# Patient Record
Sex: MALE | Race: BLACK OR AFRICAN AMERICAN | NOT HISPANIC OR LATINO | Employment: PART TIME | URBAN - METROPOLITAN AREA
[De-identification: names, ages, dates, MRNs, and addresses within clinical notes are randomized per-mention and may not be internally consistent; named-entity substitution may affect disease eponyms.]

---

## 2017-07-29 ENCOUNTER — HOSPITAL ENCOUNTER (EMERGENCY)
Facility: CLINIC | Age: 22
Discharge: HOME | End: 2017-07-29
Attending: PHYSICIAN ASSISTANT | Admitting: PHYSICIAN ASSISTANT
Payer: COMMERCIAL

## 2017-07-29 VITALS
DIASTOLIC BLOOD PRESSURE: 79 MMHG | RESPIRATION RATE: 18 BRPM | BODY MASS INDEX: 25.83 KG/M2 | TEMPERATURE: 96.4 F | SYSTOLIC BLOOD PRESSURE: 129 MMHG | OXYGEN SATURATION: 99 % | WEIGHT: 180 LBS

## 2017-07-29 DIAGNOSIS — H10.33 ACUTE CONJUNCTIVITIS OF BOTH EYES, UNSPECIFIED ACUTE CONJUNCTIVITIS TYPE: ICD-10-CM

## 2017-07-29 PROCEDURE — 99283 EMERGENCY DEPT VISIT LOW MDM: CPT

## 2017-07-29 RX ORDER — POLYMYXIN B SULFATE AND TRIMETHOPRIM 1; 10000 MG/ML; [USP'U]/ML
1 SOLUTION OPHTHALMIC
Qty: 1 BOTTLE | Refills: 0 | Status: SHIPPED | OUTPATIENT
Start: 2017-07-29 | End: 2017-08-05

## 2017-07-29 ASSESSMENT — ENCOUNTER SYMPTOMS
COUGH: 0
EYE ITCHING: 0
FEVER: 0
EYE PAIN: 1
FACIAL SWELLING: 0
SORE THROAT: 0
PHOTOPHOBIA: 1
EYE DISCHARGE: 1
EYE REDNESS: 1
RHINORRHEA: 0

## 2017-07-29 NOTE — DISCHARGE INSTRUCTIONS
"Discharge Instructions  Conjunctivitis  Conjunctivitis, or \"pinkeye\", is inflammation of the conjunctiva which is the thin membrane that lines the inner surface of the eyelids and the whites of the eyes.   There are four main types of conjunctivitis: viral, bacterial, allergic, and non-specific. Both bacterial and viral conjunctivitis spread easily from one person to another by contact with the eye or another person s hands, by an object the infected person has touched, such as a door handle, or by sharing an object that has touched their eye such as a towel or pillow case. Because of this, children with bacterial conjunctivitis can t go back to school or  until they have been on antibiotic drops for 24 hours.  VIRAL CONJUNCTIVITIS:  This is typically caused by the virus that also causes the common cold and is often seen as part of a general cold.  This type of conjunctivitis is not treated with antibiotics, and usually lasts 3 - 5 days.  An over the counter antihistamine/decongestant eye drop may help to relieve the itching and irritation of viral conjunctivitis.  BACTERIAL CONJUNCTIVITIS:  This is treated with an antibiotic ointment or eye drop.  In both bacterial and viral conjunctivitis, do not wear contact lenses until your eye is no longer red.   Your contact case should be thrown away and the contacts disinfected overnight, or replaced if disposable.  NON SPECIFIC CONJUNCTIVITIS: Sometimes a red eye is caused by other things such as dry eye, chemical exposure, or foreign body in the eye such as dust or eyelash.   All of these problems generally improve on their own within 24 hours.  ALLERGIC CONJUNCTIVITIS: These are eye symptoms caused by allergies. This type of conjunctivitis will be treated with allergy medications.    Return to the Emergency Department if:    If you have blurry vision.    If you have increasing eye pain or drainage.    If you have redness or swelling in the skin around the " eye.    If you have a fever.    Follow-up with your doctor:      Your eye should improve within 2 days, if it does not, return to the Emergency Department or see your regular doctor for a second eye exam.  If you were given a prescription for medicine here today, be sure to read all of the information (including the package insert) that comes with your prescription.  This will include important information about the medicine, its side effects, and any warnings that you need to know about.  The pharmacist who fills the prescription can provide more information and answer questions you may have about the medicine.  If you have questions or concerns that the pharmacist cannot address, please call or return to the Emergency Department.           Remember that you can always come back to the Emergency Department if you are not able to see your regular doctor in the amount of time listed above, if you get any new symptoms, or if there is anything that worries you.

## 2017-07-29 NOTE — ED PROVIDER NOTES
"  History     Chief Complaint:  Eye Pain     HPI   Jakcie Medeiros is a 22 year old male who presents to the emergency department today for evaluation of eye pain. The patient reports a week ago he developed left eye pain that has spread to the right eye with associated photophobia, redness, and crusting in the eye when he wakes up. He does have some blurriness from the drainage, but this clears without any other vision changes.  He started working night shift and \"can't stand\" looking at the light or screen to do homework. He took an off brand tylenol and \"allergy medicine\"  Without relief. Due to persistent pain, he presents to the ED for further evalution. Upon presentation, he states he had a similar episode a year ago in which he went to an optical area at [facility] and diagnosed with conjunctivitis and started on drops. The left eye is worse than the right. He denies known chemical exposure or getting anything in the eye. Of note, he wears glasses, but not contacts. He denies any fever, facial pain or swelling, cough or cold symptoms, or any other symptoms or concerns.     Allergies:  Dust mites     Medications:    The patient is currently on no regular medications.    Past Medical History:    Anemia  Vitamin D deficiency     Past Surgical History:    History reviewed. No pertinent surgical history.    Family History:    History reviewed. No pertinent family history.     Social History:  The patient was alone.  Smoking Status: Never  Smokeless Tobacco: Never  Alcohol Use: No  Marital Status:  Single      Review of Systems   Constitutional: Negative for fever.   HENT: Negative for congestion, ear pain, facial swelling, rhinorrhea and sore throat.    Eyes: Positive for photophobia, pain, discharge, redness and visual disturbance. Negative for itching.   Respiratory: Negative for cough.    Skin: Negative for rash.   All other systems reviewed and are negative.    Physical Exam   First Vitals:  BP: 129/79  Heart " Rate: 90  Temp: 96.4  F (35.8  C)  Resp: 16  Weight: 81.6 kg (180 lb)  SpO2: 99 %      Physical Exam  General: Resting comfortably on a chair  Head:  The scalp, head and face appear normal. No evidence of trauma.   Eyes:  Pupils are equal, round and reactive to light and accomodation. EOM intact.     No foreign body.     Left conjunctival injection with faint injection on the right. Small amount of thick yellow drainage to the corner of the left eye.     No orbital swelling, redness or tenderness.   Resp:  Non-labored breathing. No tachypnea.    MS:  Normal muscular tone.   Neuro:  Awake and alert.    Skin:  No abrasions or lacerations, rash or ecchymosis.   Psych: Normal affect. Appropriate interactions.       Emergency Department Course     Emergency Department Course:  Nursing notes and vitals reviewed.  1620: I performed an exam of the patient as documented above.   I discussed the treatment plan with the patient. They expressed understanding of this plan and consented to discharge. They will be discharged home with instructions for care and follow up. In addition, the patient will return to the emergency department if their symptoms persist, worsen, if new symptoms arise or if there is any concern.  All questions were answered.    Impression & Plan      Medical Decision Making:  Jackie Medeiros is a 22 year old male who presents for evaluation of a red eye.  A broad differential diagnosis was considered including bacterial conjunctivitis, viral conjunctivitis, foreign body, corneal abrasion, chemical vs allergic conjunctivitis, corneal ulcer, HSV, herpes zoster opthalmicus, endopthalmitis, orbital cellulitis, etc.  Signs and symptoms consistent with a conjunctivitis, likely bacterial. Will start antibiotics and have close follow-up of eye physician.  No red flag symptoms to suggest any of the above worrisome etiologies.  I discussed the results, plan and any additional questions with the patient. He  verbalized understanding and agreement with the plan.      Diagnosis:    ICD-10-CM    1. Acute conjunctivitis of both eyes, unspecified acute conjunctivitis type H10.33      Disposition:  discharged to home with the below prescription     Discharge Medications:  New Prescriptions    TRIMETHOPRIM-POLYMYXIN B (POLYTRIM) OPHTHALMIC SOLUTION    Place 1 drop into both eyes every 3 hours for 7 days     Scribe Disclosure:  IZelda, am serving as a scribe at 4:12 PM on 7/29/2017 to document services personally performed by Flavia Hutchinson PA-C based on my observations and the provider's statements to me.     7/29/2017    EMERGENCY DEPARTMENT       Flavia Hutchinson PA-C  07/29/17 0224

## 2017-07-29 NOTE — LETTER
EMERGENCY DEPARTMENT  [address] [facility] [address]  [phone]    2017    Jackie Medeiros  [address]   [address]  [place] [address]  [phone] (home) none (work)    : 1995      To Whom it may concern:    Jackie Medeiros was seen in our Emergency Department today, 2017.  He may not return to work until 24 hours on antibiotics, which should be  at 5 pm he can return.     Sincerely,        Flavia Hutchinson

## 2017-07-29 NOTE — ED AVS SNAPSHOT
Emergency Department    [address] [facility] [address]    Phone:  [phone]    Fax:  [phone]                                       Jackie Medeiros   MRN: [id number]    Department:   Emergency Department   Date of Visit:  7/29/2017           After Visit Summary Signature Page     I have received my discharge instructions, and my questions have been answered. I have discussed any challenges I see with this plan with the nurse or doctor.    ..........................................................................................................................................  Patient/Patient Representative Signature      ..........................................................................................................................................  Patient Representative Print Name and Relationship to Patient    ..................................................               ................................................  Date                                            Time    ..........................................................................................................................................  Reviewed by Signature/Title    ...................................................              ..............................................  Date                                                            Time

## 2017-07-29 NOTE — ED AVS SNAPSHOT
"  Emergency Department    [address] [facility] [address]    Phone:  [phone]    Fax:  [phone]                                       Jackie Medeiros   MRN: [id number]    Department:   Emergency Department   Date of Visit:  7/29/2017           Patient Information     Date Of Birth          1995        Your diagnoses for this visit were:     Acute conjunctivitis of both eyes, unspecified acute conjunctivitis type        You were seen by Flavia Hutchinson PA-C.      Follow-up Information     Please follow up.    Why:  your opthalmologist in 2-3 days for recheck        Follow up with  Emergency Department.    Specialty:  EMERGENCY MEDICINE    Why:  If symptoms worsen    Contact information:    [address] [facility] [id number] [phone]        Discharge Instructions       Discharge Instructions  Conjunctivitis  Conjunctivitis, or \"pinkeye\", is inflammation of the conjunctiva which is the thin membrane that lines the inner surface of the eyelids and the whites of the eyes.   There are four main types of conjunctivitis: viral, bacterial, allergic, and non-specific. Both bacterial and viral conjunctivitis spread easily from one person to another by contact with the eye or another person s hands, by an object the infected person has touched, such as a door handle, or by sharing an object that has touched their eye such as a towel or pillow case. Because of this, children with bacterial conjunctivitis can t go back to school or  until they have been on antibiotic drops for 24 hours.  VIRAL CONJUNCTIVITIS:  This is typically caused by the virus that also causes the common cold and is often seen as part of a general cold.  This type of conjunctivitis is not treated with antibiotics, and usually lasts 3 - 5 days.  An over the counter antihistamine/decongestant eye drop may help to relieve the itching and irritation of viral conjunctivitis.  BACTERIAL " CONJUNCTIVITIS:  This is treated with an antibiotic ointment or eye drop.  In both bacterial and viral conjunctivitis, do not wear contact lenses until your eye is no longer red.   Your contact case should be thrown away and the contacts disinfected overnight, or replaced if disposable.  NON SPECIFIC CONJUNCTIVITIS: Sometimes a red eye is caused by other things such as dry eye, chemical exposure, or foreign body in the eye such as dust or eyelash.   All of these problems generally improve on their own within 24 hours.  ALLERGIC CONJUNCTIVITIS: These are eye symptoms caused by allergies. This type of conjunctivitis will be treated with allergy medications.    Return to the Emergency Department if:    If you have blurry vision.    If you have increasing eye pain or drainage.    If you have redness or swelling in the skin around the eye.    If you have a fever.    Follow-up with your doctor:      Your eye should improve within 2 days, if it does not, return to the Emergency Department or see your regular doctor for a second eye exam.  If you were given a prescription for medicine here today, be sure to read all of the information (including the package insert) that comes with your prescription.  This will include important information about the medicine, its side effects, and any warnings that you need to know about.  The pharmacist who fills the prescription can provide more information and answer questions you may have about the medicine.  If you have questions or concerns that the pharmacist cannot address, please call or return to the Emergency Department.           Remember that you can always come back to the Emergency Department if you are not able to see your regular doctor in the amount of time listed above, if you get any new symptoms, or if there is anything that worries you.          24 Hour Appointment Hotline       To make an appointment at any [facility], call [phone] ([phone]). If you  don't have a family doctor or clinic, we will help you find one. [facility] clinics are conveniently located to serve the needs of you and your family.             Review of your medicines      START taking        Dose / Directions Last dose taken    trimethoprim-polymyxin b ophthalmic solution   Commonly known as:  POLYTRIM   Dose:  1 drop   Quantity:  1 Bottle        Place 1 drop into both eyes every 3 hours for 7 days   Refills:  0                Prescriptions were sent or printed at these locations (1 Prescription)                   Other Prescriptions                Printed at Department/Unit printer (1 of 1)         trimethoprim-polymyxin b (POLYTRIM) ophthalmic solution                Orders Needing Specimen Collection     None      Pending Results     No orders found from 7/27/2017 to 7/30/2017.            Pending Culture Results     No orders found from 7/27/2017 to 7/30/2017.            Pending Results Instructions     If you had any lab results that were not finalized at the time of your Discharge, you can call the ED Lab Result RN at [phone]. You will be contacted by this team for any positive Lab results or changes in treatment. The nurses are available 7 days a week from 10A to 6:30P.  You can leave a message 24 hours per day and they will return your call.        Test Results From Your Hospital Stay               Clinical Quality Measure: Blood Pressure Screening     Your blood pressure was checked while you were in the emergency department today. The last reading we obtained was  BP: 129/79 . Please read the guidelines below about what these numbers mean and what you should do about them.  If your systolic blood pressure (the top number) is less than 120 and your diastolic blood pressure (the bottom number) is less than 80, then your blood pressure is normal. There is nothing more that you need to do about it.  If your systolic blood pressure (the top number) is 120-139 or your diastolic blood  pressure (the bottom number) is 80-89, your blood pressure may be higher than it should be. You should have your blood pressure rechecked within a year by a primary care provider.  If your systolic blood pressure (the top number) is 140 or greater or your diastolic blood pressure (the bottom number) is 90 or greater, you may have high blood pressure. High blood pressure is treatable, but if left untreated over time it can put you at risk for heart attack, stroke, or kidney failure. You should have your blood pressure rechecked by a primary care provider within the next 4 weeks.  If your provider in the emergency department today gave you specific instructions to follow-up with your doctor or provider even sooner than that, you should follow that instruction and not wait for up to 4 weeks for your follow-up visit.        Thank you for choosing [facility]       Thank you for choosing [facility] for your care. Our goal is always to provide you with excellent care. Hearing back from our patients is one way we can continue to improve our services. Please take a few minutes to complete the written survey that you may receive in the mail after you visit with us. Thank you!        [employer] Information     [employer] gives you secure access to your electronic health record. If you see a primary care provider, you can also send messages to your care team and make appointments. If you have questions, please call your primary care clinic.  If you do not have a primary care provider, please call [phone] and they will assist you.        Care EveryWhere ID     This is your Care EveryWhere ID. This could be used by other organizations to access your [facility] medical records  [id number]        Equal Access to Services     JONY FRANKEL : Hadii sita wallaceo Sonick, waaxda lubaldevadaha, qaybta kaalenrique vasquez . So [facility] [phone].    ATENCIÓN: Si kristen espkoby, madisyn a jacob disposición  servicios gratuitos de asistencia lingüística. Asher huff [phone].    We comply with applicable federal civil rights laws and [place] laws. We do not discriminate on the basis of race, color, national origin, age, disability sex, sexual orientation or gender identity.            After Visit Summary       This is your record. Keep this with you and show to your community pharmacist(s) and doctor(s) at your next visit.

## 2017-09-22 ENCOUNTER — OFFICE VISIT (OUTPATIENT)
Dept: URGENT CARE | Facility: URGENT CARE | Age: 22
End: 2017-09-22
Payer: COMMERCIAL

## 2017-09-22 VITALS
SYSTOLIC BLOOD PRESSURE: 142 MMHG | TEMPERATURE: 99.4 F | WEIGHT: 194.9 LBS | DIASTOLIC BLOOD PRESSURE: 78 MMHG | OXYGEN SATURATION: 96 % | HEART RATE: 89 BPM | BODY MASS INDEX: 27.97 KG/M2

## 2017-09-22 DIAGNOSIS — T78.40XA ALLERGIC REACTION, INITIAL ENCOUNTER: Primary | ICD-10-CM

## 2017-09-22 DIAGNOSIS — L50.9 HIVES: ICD-10-CM

## 2017-09-22 PROCEDURE — 99214 OFFICE O/P EST MOD 30 MIN: CPT | Mod: 25 | Performed by: FAMILY MEDICINE

## 2017-09-22 PROCEDURE — 96372 THER/PROPH/DIAG INJ SC/IM: CPT | Performed by: FAMILY MEDICINE

## 2017-09-22 RX ORDER — MINOCYCLINE HYDROCHLORIDE 100 MG/1
CAPSULE ORAL
Refills: 0 | COMMUNITY
Start: 2017-09-13 | End: 2017-09-25

## 2017-09-22 RX ORDER — CETIRIZINE HYDROCHLORIDE 10 MG/1
10 TABLET ORAL EVERY EVENING
Qty: 1 TABLET | Refills: 0
Start: 2017-09-22 | End: 2017-09-23

## 2017-09-22 RX ORDER — METHYLPREDNISOLONE SODIUM SUCCINATE 125 MG/2ML
125 INJECTION, POWDER, LYOPHILIZED, FOR SOLUTION INTRAMUSCULAR; INTRAVENOUS ONCE
Qty: 2 ML | Refills: 0 | OUTPATIENT
Start: 2017-09-22 | End: 2017-09-22

## 2017-09-22 NOTE — MR AVS SNAPSHOT
After Visit Summary   9/22/2017    Jackie Medeiros    MRN: [id number]           Patient Information     Date Of Birth          1995        Visit Information        Provider Department      9/22/2017 12:10 PM Freddy Kent DO [facility]        Today's Diagnoses     Hives    -  1       Follow-ups after your visit        Your next 10 appointments already scheduled     Sep 22, 2017  1:20 PM CDT   Office Visit with Yves Moscoso MD   [facility] ([facility]    [address] [id number] [phone]           Bring a current list of meds and any records pertaining to this visit. For Physicals, please bring immunization records and any forms needing to be filled out. Please arrive 10 minutes early to complete paperwork.              Who to contact     If you have questions or need follow up information about today's clinic visit or your schedule please contact [facility] directly at [phone].  Normal or non-critical lab and imaging results will be communicated to you by MyChart, letter or phone within 4 business days after the clinic has received the results. If you do not hear from us within 7 days, please contact the clinic through [employer] or phone. If you have a critical or abnormal lab result, we will notify you by phone as soon as possible.  Submit refill requests through [facility] or call your pharmacy and they will forward the refill request to us. Please allow 3 business days for your refill to be completed.          Additional Information About Your Visit        MyChart Information     [employer] gives you secure access to your electronic health record. If you see a primary care provider, you can also send messages to your care team and make appointments. If you have questions, please call your primary care clinic.  If you do not have a primary care  provider, please call [phone] and they will assist you.        Care EveryWhere ID     This is your Care EveryWhere ID. This could be used by other organizations to access your [facility] medical records  [id number]        Your Vitals Were     Pulse Temperature Pulse Oximetry BMI (Body Mass Index)          89 99.4  F (37.4  C) (Oral) 96% 27.97 kg/m2         Blood Pressure from Last 3 Encounters:   09/22/17 142/78   07/29/17 129/79   06/08/16 100/80    Weight from Last 3 Encounters:   09/22/17 194 lb 14.4 oz (88.4 kg)   07/29/17 180 lb (81.6 kg)   06/08/16 175 lb (79.4 kg)              We Performed the Following     METHYLPREDNISOLONE INJ / 125MG          Today's Medication Changes          These changes are accurate as of: 9/22/17  1:04 PM.  If you have any questions, ask your nurse or doctor.               Start taking these medicines.        Dose/Directions    cetirizine 10 MG tablet   Commonly known as:  zyrTEC   Used for:  Hives   Started by:  Freddy Kent DO        Dose:  10 mg   Take 1 tablet (10 mg) by mouth every evening for 1 day   Quantity:  1 tablet   Refills:  0       methylPREDNISolone sodium succinate 125 mg/2 mL injection   Commonly known as:  solu-MEDROL   Used for:  Hives   Started by:  Freddy Kent DO        Dose:  125 mg   Inject 2 mLs (125 mg) into the muscle once for 1 dose   Quantity:  2 mL   Refills:  0            Where to get your medicines      Some of these will need a paper prescription and others can be bought over the counter.  Ask your nurse if you have questions.     You don't need a prescription for these medications     cetirizine 10 MG tablet    methylPREDNISolone sodium succinate 125 mg/2 mL injection                Primary Care Provider Office Phone # Fax #    Yves Moscoso [phone]       [address] [place] [address]        Equal Access to Services     JONY FRANKEL AH: Pooja Michel, luiza morel, jerrell irving  enrique resendizkirtidemar la'aahi ah. Dianne [facility] [phone].    ATENCIÓN: Si habla audelia, tiene a jacob disposición servicios gratuitos de asistencia lingüística. Asher al [phone].    We comply with applicable federal civil rights laws and [place] laws. We do not discriminate on the basis of race, color, national origin, age, disability sex, sexual orientation or gender identity.            Thank you!     Thank you for choosing [facility]  for your care. Our goal is always to provide you with excellent care. Hearing back from our patients is one way we can continue to improve our services. Please take a few minutes to complete the written survey that you may receive in the mail after your visit with us. Thank you!             Your Updated Medication List - Protect others around you: Learn how to safely use, store and throw away your medicines at www.disposemymeds.org.          This list is accurate as of: 9/22/17  1:04 PM.  Always use your most recent med list.                   Brand Name Dispense Instructions for use Diagnosis    cetirizine 10 MG tablet    zyrTEC    1 tablet    Take 1 tablet (10 mg) by mouth every evening for 1 day    Hives       methylPREDNISolone sodium succinate 125 mg/2 mL injection    solu-MEDROL    2 mL    Inject 2 mLs (125 mg) into the muscle once for 1 dose    Hives       minocycline 100 MG capsule    MINOCIN/DYNACIN     TAKE 1 BY MOUTH TWICE DAILY FOR ACNE

## 2017-09-22 NOTE — NURSING NOTE
"Chief Complaint   Patient presents with     Urgent Care     pt states itchy, whole body swollen, tightness 2x days        Initial /78  Pulse 89  Temp 99.4  F (37.4  C) (Oral)  Wt 194 lb 14.4 oz (88.4 kg)  SpO2 96%  BMI 27.97 kg/m2 Estimated body mass index is 27.97 kg/(m^2) as calculated from the following:    Height as of 6/8/16: 5' 10\" (1.778 m).    Weight as of this encounter: 194 lb 14.4 oz (88.4 kg).  Medication Reconciliation: complete      "

## 2017-09-22 NOTE — PROGRESS NOTES
SUBJECTIVE:Jackie Medeiros is a 22 year old male who presents to the clinic today for a rash.  Onset of rash was 1 day(s) ago.   Rash is still present.   Location of the rash: generalized.  Associated symptoms include: itching.    Symptoms are moderate and rash seems to be worsening.  Therapies tried to improve the rash: none.  Previous history of a similar rash? No  Recent exposure history: minocycle for acne started 1 week ago    Past Medical History:   Diagnosis Date     Abnormal laboratory test 1/1/2012     Allergic state      Anemia 8/6/2013     Environmental allergies 1/6/2013     Vitamin D deficiency 1/24/14    vitamin D level 23     Allergies   Allergen Reactions     Dust Mites      Minocycline Hives     Social History   Substance Use Topics     Smoking status: Never Smoker     Smokeless tobacco: Never Used     Alcohol use No       ROS:CONSTITUTIONAL:NEGATIVE for fever, chills, change in weight  ENT/MOUTH: NEGATIVE for ear, mouth and throat problems  RESP:NEGATIVE for significant cough or SOB    EXAM: VITALS: /78  Pulse 89  Temp 99.4  F (37.4  C) (Oral)  Wt 194 lb 14.4 oz (88.4 kg)  SpO2 96%  BMI 27.97 kg/m2  General:healthy,alert,no distress    Location: generalized     Distribution: diffuse/patchy     Lesion grouping: bilareerat       Lesion type: macular and wheals     Color: skin color with no other findingsPERTINENT EXAM: GENERAL APPEARANCE: healthy, alert and no distress  EYES: EOMI,  PERRL, conjunctiva clear  NECK: supple, non-tender to palpation, no adenopathy noted  RESP: lungs clear to auscultation - no rales, rhonchi or wheezes  CV: regular rates and rhythm, normal S1 S2, no murmur noted      ICD-10-CM    1. Hives L50.9 cetirizine (ZYRTEC) 10 MG tablet     methylPREDNISolone sodium succinate (SOLU-MEDROL) 125 mg/2 mL injection     METHYLPREDNISOLONE INJ / 125MG     Dc minocycline and cont zyrtec  Follow-up with primary clinic if not improving

## 2017-09-25 ENCOUNTER — OFFICE VISIT (OUTPATIENT)
Dept: INTERNAL MEDICINE | Facility: CLINIC | Age: 22
End: 2017-09-25
Payer: COMMERCIAL

## 2017-09-25 VITALS
SYSTOLIC BLOOD PRESSURE: 114 MMHG | DIASTOLIC BLOOD PRESSURE: 82 MMHG | OXYGEN SATURATION: 97 % | BODY MASS INDEX: 27.96 KG/M2 | TEMPERATURE: 98.9 F | WEIGHT: 195.3 LBS | HEART RATE: 75 BPM | HEIGHT: 70 IN

## 2017-09-25 DIAGNOSIS — L50.9 HIVES: Primary | ICD-10-CM

## 2017-09-25 PROCEDURE — 99213 OFFICE O/P EST LOW 20 MIN: CPT | Performed by: INTERNAL MEDICINE

## 2017-09-25 RX ORDER — CETIRIZINE HYDROCHLORIDE 10 MG/1
10 TABLET ORAL DAILY
COMMUNITY
End: 2020-09-17

## 2017-09-25 NOTE — MR AVS SNAPSHOT
"              After Visit Summary   9/25/2017    Jackie Medeiros    MRN: [id number]           Patient Information     Date Of Birth          1995        Visit Information        Provider Department      9/25/2017 1:40 PM Anastacio Frost MD [facility]        Today's Diagnoses     Hives    -  1       Follow-ups after your visit        Who to contact     If you have questions or need follow up information about today's clinic visit or your schedule please contact [facility] directly at [phone].  Normal or non-critical lab and imaging results will be communicated to you by [employer], letter or phone within 4 business days after the clinic has received the results. If you do not hear from us within 7 days, please contact the clinic through [employer] or phone. If you have a critical or abnormal lab result, we will notify you by phone as soon as possible.  Submit refill requests through [employer] or call your pharmacy and they will forward the refill request to us. Please allow 3 business days for your refill to be completed.          Additional Information About Your Visit        MyChart Information     [employer] gives you secure access to your electronic health record. If you see a primary care provider, you can also send messages to your care team and make appointments. If you have questions, please call your primary care clinic.  If you do not have a primary care provider, please call [phone] and they will assist you.        Care EveryWhere ID     This is your Care EveryWhere ID. This could be used by other organizations to access your [facility] medical records  [id number]        Your Vitals Were     Pulse Temperature Height Pulse Oximetry BMI (Body Mass Index)       75 98.9  F (37.2  C) (Oral) 5' 10\" (1.778 m) 97% 28.02 kg/m2        Blood Pressure from Last 3 Encounters:   09/25/17 114/82   09/22/17 142/78   07/29/17 129/79    Weight from Last 3 " Encounters:   09/25/17 195 lb 4.8 oz (88.6 kg)   09/22/17 194 lb 14.4 oz (88.4 kg)   07/29/17 180 lb (81.6 kg)              Today, you had the following     No orders found for display         Today's Medication Changes          These changes are accurate as of: 9/25/17  3:42 PM.  If you have any questions, ask your nurse or doctor.               Stop taking these medicines if you haven't already. Please contact your care team if you have questions.     minocycline 100 MG capsule   Commonly known as:  MINOCIN/DYNACIN   Stopped by:  Anastacio Frost MD                    Primary Care Provider Office Phone # Fax #    Yves Deepak Moscoso [phone]       [address] [place] [address]        Equal Access to Services     JONY FRANKEL : Pooja ramirez Sonick, waaxda luqadaha, qaybta kaalmada adeegyasandy, enrique glover . So [facility] [phone].    ATENCIÓN: Si habla español, tiene a jacob disposición servicios gratuitos de asistencia lingüística. Llame al [phone].    We comply with applicable federal civil rights laws and [place] laws. We do not discriminate on the basis of race, color, national origin, age, disability sex, sexual orientation or gender identity.            Thank you!     Thank you for choosing [facility]  for your care. Our goal is always to provide you with excellent care. Hearing back from our patients is one way we can continue to improve our services. Please take a few minutes to complete the written survey that you may receive in the mail after your visit with us. Thank you!             Your Updated Medication List - Protect others around you: Learn how to safely use, store and throw away your medicines at www.disposemymeds.org.          This list is accurate as of: 9/25/17  3:42 PM.  Always use your most recent med list.                   Brand Name Dispense Instructions for use Diagnosis    cetirizine 10 MG  tablet    zyrTEC     Take 10 mg by mouth daily

## 2017-09-25 NOTE — NURSING NOTE
"Chief Complaint   Patient presents with     Hospital F/U       Initial /82  Pulse 75  Temp 98.9  F (37.2  C) (Oral)  Ht 5' 10\" (1.778 m)  Wt 195 lb 4.8 oz (88.6 kg)  SpO2 97%  BMI 28.02 kg/m2 Estimated body mass index is 28.02 kg/(m^2) as calculated from the following:    Height as of this encounter: 5' 10\" (1.778 m).    Weight as of this encounter: 195 lb 4.8 oz (88.6 kg).  Medication Reconciliation: complete    "

## 2017-09-25 NOTE — PROGRESS NOTES
"  SUBJECTIVE:   Jackie Medeiros is a 22 year old male who presents to clinic today for the following health issues:      ED/UC Followup:    Facility:  [facility]  Date of visit: 9-22-17  Reason for visit: Allergic reaction  Current Status: better but still can feel it     Seen for Hives- now resolvec      Problem list and histories reviewed & adjusted, as indicated.  Additional history: as documented    Labs reviewed in EPIC    Reviewed and updated as needed this visit by clinical staff  Allergies       Reviewed and updated as needed this visit by Provider         ROS:  Constitutional, HEENT, cardiovascular, pulmonary, gi and gu systems are negative, except as otherwise noted.      OBJECTIVE:                                                    /82  Pulse 75  Temp 98.9  F (37.2  C) (Oral)  Ht 5' 10\" (1.778 m)  Wt 195 lb 4.8 oz (88.6 kg)  SpO2 97%  BMI 28.02 kg/m2  Body mass index is 28.02 kg/(m^2).  GENERAL APPEARANCE: healthy, alert and no distress  SKIN: no suspicious lesions or rashes    Diagnostic test results:  none        ASSESSMENT/PLAN:                                                    1. Hives  - seems resolved. Possible allergy to minocycline given timing      Anastacio Frost MD  [facility]  "

## 2018-12-07 ENCOUNTER — OFFICE VISIT (OUTPATIENT)
Dept: INTERNAL MEDICINE | Facility: CLINIC | Age: 23
End: 2018-12-07
Payer: COMMERCIAL

## 2018-12-07 VITALS
TEMPERATURE: 98.1 F | OXYGEN SATURATION: 99 % | DIASTOLIC BLOOD PRESSURE: 74 MMHG | WEIGHT: 187.8 LBS | BODY MASS INDEX: 26.95 KG/M2 | SYSTOLIC BLOOD PRESSURE: 118 MMHG | HEART RATE: 87 BPM

## 2018-12-07 DIAGNOSIS — R53.81 MALAISE: Primary | ICD-10-CM

## 2018-12-07 DIAGNOSIS — R30.0 DYSURIA: ICD-10-CM

## 2018-12-07 LAB
ALBUMIN UR-MCNC: NEGATIVE MG/DL
APPEARANCE UR: CLEAR
BILIRUB UR QL STRIP: NEGATIVE
COLOR UR AUTO: YELLOW
GLUCOSE UR STRIP-MCNC: NEGATIVE MG/DL
HGB UR QL STRIP: NEGATIVE
KETONES UR STRIP-MCNC: NEGATIVE MG/DL
LEUKOCYTE ESTERASE UR QL STRIP: NEGATIVE
NITRATE UR QL: NEGATIVE
PH UR STRIP: 5.5 PH (ref 5–7)
RBC #/AREA URNS AUTO: NORMAL /HPF
SOURCE: NORMAL
SP GR UR STRIP: 1.02 (ref 1–1.03)
UROBILINOGEN UR STRIP-ACNC: 0.2 EU/DL (ref 0.2–1)
WBC #/AREA URNS AUTO: NORMAL /HPF

## 2018-12-07 PROCEDURE — 87491 CHLMYD TRACH DNA AMP PROBE: CPT | Performed by: PHYSICIAN ASSISTANT

## 2018-12-07 PROCEDURE — 99214 OFFICE O/P EST MOD 30 MIN: CPT | Performed by: PHYSICIAN ASSISTANT

## 2018-12-07 PROCEDURE — 81001 URINALYSIS AUTO W/SCOPE: CPT | Performed by: PHYSICIAN ASSISTANT

## 2018-12-07 PROCEDURE — 87591 N.GONORRHOEAE DNA AMP PROB: CPT | Performed by: PHYSICIAN ASSISTANT

## 2018-12-07 RX ORDER — AMOXICILLIN 500 MG/1
CAPSULE ORAL
Refills: 3 | COMMUNITY
Start: 2018-11-29 | End: 2020-02-12

## 2018-12-07 NOTE — MR AVS SNAPSHOT
After Visit Summary   12/7/2018    Jackie Medeiros    MRN: [id number]           Patient Information     Date Of Birth          1995        Visit Information        Provider Department      12/7/2018 8:40 AM Fanny Woods PA-C [facility]        Today's Diagnoses     Dysuria    -  1    Malaise           Follow-ups after your visit        Follow-up notes from your care team     Return in about 1 month (around 1/7/2019) for Physical Exam.      Who to contact     If you have questions or need follow up information about today's clinic visit or your schedule please contact [facility] directly at [phone].  Normal or non-critical lab and imaging results will be communicated to you by MyChart, letter or phone within 4 business days after the clinic has received the results. If you do not hear from us within 7 days, please contact the clinic through [employer] or phone. If you have a critical or abnormal lab result, we will notify you by phone as soon as possible.  Submit refill requests through [employer] or call your pharmacy and they will forward the refill request to us. Please allow 3 business days for your refill to be completed.          Additional Information About Your Visit        MyChart Information     [employer] gives you secure access to your electronic health record. If you see a primary care provider, you can also send messages to your care team and make appointments. If you have questions, please call your primary care clinic.  If you do not have a primary care provider, please call [phone] and they will assist you.        Care EveryWhere ID     This is your Care EveryWhere ID. This could be used by other organizations to access your [facility] medical records  [id number]        Your Vitals Were     Pulse Temperature Pulse Oximetry BMI (Body Mass Index)          87 98.1  F (36.7  C) (Oral) 99% 26.95 kg/m2         Blood Pressure  from Last 3 Encounters:   12/07/18 118/74   09/25/17 114/82   09/22/17 142/78    Weight from Last 3 Encounters:   12/07/18 187 lb 12.8 oz (85.2 kg)   09/25/17 195 lb 4.8 oz (88.6 kg)   09/22/17 194 lb 14.4 oz (88.4 kg)              We Performed the Following     CBC with platelets and differential     Chlamydia trachomatis PCR     Comprehensive metabolic panel     NEISSERIA GONORRHOEA PCR     UA with Microscopic reflex to Culture        Primary Care Provider Office Phone # Fax #    Yves Moscoso [phone]       [address] [facility] [address]        Equal Access to Services     JONY FRANKEL : Hadii sita wallaceo Sonick, waaxda adriel, qaybta kaalmada aderan, enrique mead. So [facility] [phone].    ATENCIÓN: Si habla español, tiene a jacob disposición servicios gratuitos de asistencia lingüística. Llame al [phone].    We comply with applicable federal civil rights laws and [place] laws. We do not discriminate on the basis of race, color, national origin, age, disability, sex, sexual orientation, or gender identity.            Thank you!     Thank you for choosing [facility]  for your care. Our goal is always to provide you with excellent care. Hearing back from our patients is one way we can continue to improve our services. Please take a few minutes to complete the written survey that you may receive in the mail after your visit with us. Thank you!             Your Updated Medication List - Protect others around you: Learn how to safely use, store and throw away your medicines at www.disposemymeds.org.          This list is accurate as of 12/7/18  9:30 AM.  Always use your most recent med list.                   Brand Name Dispense Instructions for use Diagnosis    amoxicillin 500 MG capsule    AMOXIL     TAKE ONE CAPSULE BY MOUTH EVERY DAY WITH FOOD        cetirizine 10 MG tablet    zyrTEC     Take 10 mg by mouth daily

## 2018-12-07 NOTE — PROGRESS NOTES
SUBJECTIVE:   Jackie Medeiros is a 23 year old male who presents to clinic today for the following health issues:    Patient was in [facility] in Sept until November. Pt had a episode of nausea, abdominal pain,headache, loss of appetite, body aches, and felt weak . Pt does not think he has been around anyone else who is sick. Patient states symptoms started in November.  Pt notes initially he had weakness, abdominal pain, headache and loss of appetite which lasted about a week. When this resolved pt then developed painful urination with difficulty emptying his bladder. Pt was given antibiotics from a pharmacy with symptoms resolving and then they returned. Pt had a second course of antibiotics and his symptoms resolved. Pt notes he is unsure what infection he has as he was not seen at a clinic and is unsure which anitbiotic he was given. Pt no longer has any symptoms. Pt notes his wife has been treated a few times in [facility] for some type of urinary or vaginal infection. He is wondering if this could be what he has had. Pt is worried he had malaria. Pt no longer has any symptoms. He denies fever presently or anytime of his illness.     Problem list and histories reviewed & adjusted, as indicated.  Additional history: as documented    Reviewed and updated as needed this visit by clinical staff  Tobacco  Allergies  Meds  Problems       Reviewed and updated as needed this visit by Provider  Meds  Problems         OBJECTIVE:     /74  Pulse 87  Temp 98.1  F (36.7  C) (Oral)  Wt 187 lb 12.8 oz (85.2 kg)  SpO2 99%  BMI 26.95 kg/m2  Body mass index is 26.95 kg/(m^2).  GENERAL: healthy, alert and no distress  EYES: Eyes grossly normal to inspection, PERRL and conjunctivae and sclerae normal  HENT: ear canals and TM's normal, nose and mouth without ulcers or lesions  NECK: no adenopathy, no asymmetry, masses, or scars and thyroid normal to palpation  RESP: lungs clear to auscultation - no rales, rhonchi or  wheezes  CV: regular rates and rhythm, normal S1 S2, no S3 or S4 and no murmur, click or rub  ABDOMEN: soft, nontender, no hepatosplenomegaly, no masses and bowel sounds normal      ASSESSMENT/PLAN:       1. Malaise  2. Dysuria  - symptoms of body aches, loss of appetite, and nausea which resolved on its own followed by urinary symptoms which resolved after two courses of antibiotics while pt was visiting family in [facility]   - pt has no further symptoms and has never had a fever, work up as below, follow up if symptoms reoccur   - **CBC with platelets differential FUTURE 2mo; Future  - Comprehensive metabolic panel (BMP + Alb, Alk Phos, ALT, AST, Total. Bili, TP); Future  - UA with Microscopic reflex to Culture  - NEISSERIA GONORRHOEA PCR  - Chlamydia trachomatis PCR    Fanny Ruiz, BLANCA  [facility]

## 2018-12-09 LAB
C TRACH DNA SPEC QL NAA+PROBE: NEGATIVE
N GONORRHOEA DNA SPEC QL NAA+PROBE: NEGATIVE
SPECIMEN SOURCE: NORMAL
SPECIMEN SOURCE: NORMAL

## 2019-01-11 DIAGNOSIS — R53.81 MALAISE: ICD-10-CM

## 2019-01-11 LAB
BASOPHILS # BLD AUTO: 0 10E9/L (ref 0–0.2)
BASOPHILS NFR BLD AUTO: 0 %
DIFFERENTIAL METHOD BLD: NORMAL
EOSINOPHIL # BLD AUTO: 0.1 10E9/L (ref 0–0.7)
EOSINOPHIL NFR BLD AUTO: 1.7 %
ERYTHROCYTE [DISTWIDTH] IN BLOOD BY AUTOMATED COUNT: 13.5 % (ref 10–15)
HCT VFR BLD AUTO: 41.2 % (ref 40–53)
HGB BLD-MCNC: 13.7 G/DL (ref 13.3–17.7)
LYMPHOCYTES # BLD AUTO: 3 10E9/L (ref 0.8–5.3)
LYMPHOCYTES NFR BLD AUTO: 55.2 %
MCH RBC QN AUTO: 27.6 PG (ref 26.5–33)
MCHC RBC AUTO-ENTMCNC: 33.3 G/DL (ref 31.5–36.5)
MCV RBC AUTO: 83 FL (ref 78–100)
MONOCYTES # BLD AUTO: 0.5 10E9/L (ref 0–1.3)
MONOCYTES NFR BLD AUTO: 8.7 %
NEUTROPHILS # BLD AUTO: 1.9 10E9/L (ref 1.6–8.3)
NEUTROPHILS NFR BLD AUTO: 34.4 %
PLATELET # BLD AUTO: 212 10E9/L (ref 150–450)
RBC # BLD AUTO: 4.97 10E12/L (ref 4.4–5.9)
WBC # BLD AUTO: 5.4 10E9/L (ref 4–11)

## 2019-01-11 PROCEDURE — 80053 COMPREHEN METABOLIC PANEL: CPT | Performed by: PHYSICIAN ASSISTANT

## 2019-01-11 PROCEDURE — 85025 COMPLETE CBC W/AUTO DIFF WBC: CPT | Performed by: PHYSICIAN ASSISTANT

## 2019-01-11 PROCEDURE — 36415 COLL VENOUS BLD VENIPUNCTURE: CPT | Performed by: PHYSICIAN ASSISTANT

## 2019-01-12 LAB
ALBUMIN SERPL-MCNC: 4.1 G/DL (ref 3.4–5)
ALP SERPL-CCNC: 92 U/L (ref 40–150)
ALT SERPL W P-5'-P-CCNC: 29 U/L (ref 0–70)
ANION GAP SERPL CALCULATED.3IONS-SCNC: 5 MMOL/L (ref 3–14)
AST SERPL W P-5'-P-CCNC: 20 U/L (ref 0–45)
BILIRUB SERPL-MCNC: 0.4 MG/DL (ref 0.2–1.3)
BUN SERPL-MCNC: 12 MG/DL (ref 7–30)
CALCIUM SERPL-MCNC: 8.9 MG/DL (ref 8.5–10.1)
CHLORIDE SERPL-SCNC: 106 MMOL/L (ref 94–109)
CO2 SERPL-SCNC: 28 MMOL/L (ref 20–32)
CREAT SERPL-MCNC: 0.79 MG/DL (ref 0.66–1.25)
GFR SERPL CREATININE-BSD FRML MDRD: >90 ML/MIN/{1.73_M2}
GLUCOSE SERPL-MCNC: 93 MG/DL (ref 70–99)
POTASSIUM SERPL-SCNC: 3.8 MMOL/L (ref 3.4–5.3)
PROT SERPL-MCNC: 7.4 G/DL (ref 6.8–8.8)
SODIUM SERPL-SCNC: 139 MMOL/L (ref 133–144)

## 2020-02-12 ENCOUNTER — OFFICE VISIT (OUTPATIENT)
Dept: INTERNAL MEDICINE | Facility: CLINIC | Age: 25
End: 2020-02-12
Payer: COMMERCIAL

## 2020-02-12 VITALS
WEIGHT: 174.7 LBS | HEIGHT: 70 IN | OXYGEN SATURATION: 98 % | HEART RATE: 78 BPM | TEMPERATURE: 98.2 F | BODY MASS INDEX: 25.01 KG/M2 | SYSTOLIC BLOOD PRESSURE: 100 MMHG | DIASTOLIC BLOOD PRESSURE: 70 MMHG

## 2020-02-12 DIAGNOSIS — M54.50 BILATERAL LOW BACK PAIN WITHOUT SCIATICA, UNSPECIFIED CHRONICITY: ICD-10-CM

## 2020-02-12 DIAGNOSIS — M54.9 MID BACK PAIN: Primary | ICD-10-CM

## 2020-02-12 PROCEDURE — 99213 OFFICE O/P EST LOW 20 MIN: CPT | Performed by: INTERNAL MEDICINE

## 2020-02-12 ASSESSMENT — MIFFLIN-ST. JEOR: SCORE: 1773.68

## 2020-02-12 NOTE — PROGRESS NOTES
"Subjective     Jackie Medeiros is a 25 year old male who presents to clinic today for the following health issues:    HPI   Back Pain       Duration: on and off x4 months         Specific cause: lifting pallet at work     Description:   Location of pain: low back left and sometimes right   Character of pain: dull ache, nagging and intermittent  Pain radiation:none  New numbness or weakness in legs, not attributed to pain:  no     Intensity: Currently 6/10, At its worst 9/10    History:   Pain interferes with job: YES  History of back problems: no prior back problems  Any previous MRI or X-rays: None  Sees a specialist for back pain:  No  Therapies tried without relief: tylenol and ibuprofen      Alleviating factors:   Improved by: pain relief cream (name unknown) and heat      Precipitating factors:  Worsened by: Bending and Sitting          Accompanying Signs & Symptoms:  Risk of Fracture:  None  Risk of Cauda Equina:  None  Risk of Infection:  None  Risk of Cancer:  None  Risk of Ankylosing Spondylitis:  Onset at age <35, male, AND morning back stiffness. no                          Reviewed and updated as needed this visit by Provider  Tobacco  Allergies  Meds  Problems  Med Hx  Surg Hx  Fam Hx         Review of Systems         Objective    /70   Pulse 78   Temp 98.2  F (36.8  C) (Temporal)   Ht 1.778 m (5' 10\")   Wt 79.2 kg (174 lb 11.2 oz)   SpO2 98%   BMI 25.07 kg/m    Body mass index is 25.07 kg/m .  Physical Exam                 Past Medical History:  ---------------------------  Past Medical History:   Diagnosis Date     Abnormal laboratory test 1/1/2012     Allergic state      Anemia 8/6/2013     Environmental allergies 1/6/2013     Vitamin D deficiency 1/24/14    vitamin D level 23       Past Surgical History:  ---------------------------  History reviewed. No pertinent surgical history.    Current Medications:  ---------------------------  Current Outpatient Medications   Medication " Sig Dispense Refill     cetirizine (ZYRTEC) 10 MG tablet Take 10 mg by mouth daily         Allergies:  -------------  Allergies   Allergen Reactions     Dust Mites      Minocycline Hives       Social History:  -------------------  Social History     Socioeconomic History     Marital status: Single     Spouse name: Not on file     Number of children: Not on file     Years of education: Not on file     Highest education level: Not on file   Occupational History     Not on file   Social Needs     Financial resource strain: Not on file     Food insecurity:     Worry: Not on file     Inability: Not on file     Transportation needs:     Medical: Not on file     Non-medical: Not on file   Tobacco Use     Smoking status: Never Smoker     Smokeless tobacco: Never Used   Substance and Sexual Activity     Alcohol use: No     Drug use: No     Sexual activity: Yes     Partners: Female   Lifestyle     Physical activity:     Days per week: Not on file     Minutes per session: Not on file     Stress: Not on file   Relationships     Social connections:     Talks on phone: Not on file     Gets together: Not on file     Attends Holiness service: Not on file     Active member of club or organization: Not on file     Attends meetings of clubs or organizations: Not on file     Relationship status: Not on file     Intimate partner violence:     Fear of current or ex partner: Not on file     Emotionally abused: Not on file     Physically abused: Not on file     Forced sexual activity: Not on file   Other Topics Concern     Parent/sibling w/ CABG, MI or angioplasty before 65F 55M? Not Asked   Social History Narrative    Brother:    7 yrs older, in [place]    11 yrs younger        Sisters    5 yrs older    12 yrs older        Came to [place] from [place] via [place]        He is 1 yr younger.                   Family Medical History:  ------------------------------  Family History   Problem Relation Age of Onset     Family History  "Negative No family hx of          ROS:  REVIEW OF SYSTEMS:    RESP: negative for cough, dyspnea, wheezing, hemoptysis  CV: negative for chest pain, palpitations, PND, WHITAKER, orthopnea; reports no significant changes in their ability to perform physical activity (from cardiovascular standpoint)  GI: negative for dysphagia, N/V, pain, melena, diarrhea and constipation  NEURO: negative for new numbness/tingling, paralysis, incoordination, or focal weakness     OBJECTIVE:                                                    /70   Pulse 78   Temp 98.2  F (36.8  C) (Temporal)   Ht 1.778 m (5' 10\")   Wt 79.2 kg (174 lb 11.2 oz)   SpO2 98%   BMI 25.07 kg/m       GENERAL alert and no distress  EYES:  Normal sclera,conjunctiva, EOMI  HENT: oral and posterior pharynx without lesions or erythema, facies symmetric  NECK: Neck supple. No LAD, without thyroidmegaly.  RESP: Clear to ausculation bilaterally without wheezes or crackles. Normal BS in all fields.  CV: RRR normal S1S2 without murmurs, rubs or gallops.  LYMPH: no cervical lymph adenopathy appreciated  MS: extremities- no gross deformities of the visible extremities noted,   EXT:  no lower extremity edema  PSYCH: Alert and oriented times 3; speech- coherent  SKIN:  No obvious significant skin lesions on visible portions of face   BACK:  Pt appears uncomfortable, but not in severe distress.  Pain to palpation of paraspinal lumbar muscles, muscles in spasm, palpation reproduces pain exactly. straight leg-raises negative bilaterally.  Able to move on and off the exam table, no obsevred weakness in the feet or legs with walking, standing, or ambulation. Normal reflexes in the achilles and patellar tendons.   Pain to palpation of the muscle of the upper back, particularly the rhomboids.  The muscles in the upper and mid back are in spasm and tight.  Palpation here exactly reproduces their pain.  No pain palpation of the vertebral bodies themselves.       "     ASSESSMENT/PLAN:                                                      (M54.9) Mid back pain  (primary encounter diagnosis)  Comment: Discussed typical mechanical back pain, typical causes, and atypical back pain, including red flag symptoms.  Discussed conservative tratments inclduing physical therpy, stretching and strengthening, use of heat and/or ice, NSAIDs with food, antispasmodics where indicated, and the use of narcotic pain meds where indicated.    Plan: [facility]            (M54.5) Bilateral low back pain without sciatica, unspecified chronicity  Comment: Discussed typical mechanical back pain, typical causes, and atypical back pain, including red flag symptoms.  Discussed conservative tratments inclduing physical therpy, stretching and strengthening, use of heat and/or ice, NSAIDs with food, antispasmodics where indicated, and the use of narcotic pain meds where indicated.    Plan: [facility]              See Patient Instructions    JOSÉ LUIS ANGULO M.D., MD  [facility]    (Chart documentation may have been completed, in part, with [employer] voice-recognition software. Even though reviewed, some grammatical, spelling, and word errors may remain.)

## 2020-02-12 NOTE — PATIENT INSTRUCTIONS
"        LUMBAR STRAIN/SPASM:     *  based on your history, No evidence for herniated disc, spinal stenosis, or vertebral fracture.    *  take over the counter Motrin/Ibuprofen/Advil or Aleve to help relieve pain and inflammation.  follow the directions on the bottle.  Be sure to take with a small meal to prevent stomach upset.      --Motrin 600 mg ( 3 x 200 mg tablets) three times per day every day for 5-7 days, then 2-3 timers per day as needed (take with food to avoid stomach side effects)     OR     --Aleve 2 tablets twice per day for 5-7 days every day, then twice per day as needed     *  do not go out of your way for activity, however, do not avoid basic activates and gentle activity.  Do not lay on the sofa, do not go on bed rest.        *  moist heat as needed like a micorwavable bean bag.  Apply the heat for 10-15 minutes a few times per day as needed.  I would avoid any sort of electrical heating pad out of fear of causing skin burns.  If you do use an electric heating pad, do not use a heating pad for longer than 15 minutes to lower the risk of burns.    *  avoid any lifting for the next 1 week, then a slow return to activity.  Remember to always use proper lifting technique, always bending at the knees rather than the waist.  Your thigh muscles are MUCH stronger than the smaller muscle of your lower back.    *  Physical therapy through [facility] (third floor of the [facility] [place] and many other locations throughout the [facility]) if you do not get better.    *  expect your back to be more easily re-aggravated over the next few months.  the soft tissue and muscles are going to be more easily re-irritated over the next several weeks so be careful to return to physical action slowly.    Look for back execises on Google.  (search \"low back pain rehabilitation exercises\"):  Here are some " examples:    --[email or web address]     --[email or web address]

## 2020-02-12 NOTE — NURSING NOTE
"Chief Complaint   Patient presents with     Back Pain     /70   Pulse 78   Temp 98.2  F (36.8  C) (Temporal)   Ht 1.778 m (5' 10\")   Wt 79.2 kg (174 lb 11.2 oz)   SpO2 98%   BMI 25.07 kg/m   Estimated body mass index is 25.07 kg/m  as calculated from the following:    Height as of this encounter: 1.778 m (5' 10\").    Weight as of this encounter: 79.2 kg (174 lb 11.2 oz).        Health Maintenance that is potentially due pending provider review:  NONE    n/a    ÁNGELA Nelson  "

## 2020-02-24 ENCOUNTER — HEALTH MAINTENANCE LETTER (OUTPATIENT)
Age: 25
End: 2020-02-24

## 2020-03-31 ENCOUNTER — VIRTUAL VISIT (OUTPATIENT)
Dept: DERMATOLOGY | Facility: CLINIC | Age: 25
End: 2020-03-31
Payer: COMMERCIAL

## 2020-03-31 DIAGNOSIS — Z53.9 ERRONEOUS ENCOUNTER--DISREGARD: Primary | ICD-10-CM

## 2020-03-31 NOTE — PROGRESS NOTES
Left detailed message at 12:00 pm to call us as  We've attempted to reach pt x 3 to room his before his appt with [facility]. Advise him that his appt will be canceled  If he doesn't call back or if we attempt to reach him one more time before his 12:15 pm appt today.   TRAN Armstrong LPN

## 2020-07-25 ENCOUNTER — OFFICE VISIT (OUTPATIENT)
Dept: URGENT CARE | Facility: URGENT CARE | Age: 25
End: 2020-07-25
Payer: COMMERCIAL

## 2020-07-25 VITALS
DIASTOLIC BLOOD PRESSURE: 68 MMHG | SYSTOLIC BLOOD PRESSURE: 109 MMHG | HEART RATE: 89 BPM | OXYGEN SATURATION: 98 % | BODY MASS INDEX: 24.97 KG/M2 | TEMPERATURE: 98.5 F | WEIGHT: 174 LBS

## 2020-07-25 DIAGNOSIS — K21.9 GASTROESOPHAGEAL REFLUX DISEASE WITHOUT ESOPHAGITIS: Primary | ICD-10-CM

## 2020-07-25 PROCEDURE — 99214 OFFICE O/P EST MOD 30 MIN: CPT | Performed by: FAMILY MEDICINE

## 2020-07-25 RX ORDER — ONDANSETRON 4 MG/1
4 TABLET, FILM COATED ORAL EVERY 8 HOURS PRN
Qty: 20 TABLET | Refills: 0 | Status: SHIPPED | OUTPATIENT
Start: 2020-07-25 | End: 2020-09-17

## 2020-07-25 RX ORDER — OMEPRAZOLE 20 MG/1
20 TABLET, DELAYED RELEASE ORAL DAILY
Qty: 90 TABLET | Refills: 1 | Status: SHIPPED | OUTPATIENT
Start: 2020-07-25 | End: 2020-09-17

## 2020-07-25 NOTE — LETTER
July 25, 2020      Jackie Medeiros  [address] [place] [address]        To Whom It May Concern:    Jackie Medeiros  was seen on 07/25/2020.  Please excuse him  For the following days, he was sick on the following days: 07/05, 07/12, 07/18, 07/19, and 07/25 and 07/26/2020.  Return to work on 08/03/2020        Sincerely,        [facility] Provider

## 2020-07-25 NOTE — PATIENT INSTRUCTIONS
Patient Education     What Is GERD?     With GERD, the weak LES allows food and fluids to travel back, or reflux, into the esophagus.      If you often have a painful burning feeling in your chest after you eat, you may have gastroesophageal reflux disease (GERD). Heartburn that keeps coming back is a classic symptom of GERD. But you may have other symptoms as well. A GERD diagnosis is made only after a complete evaluation by your healthcare provider.  Note: Chest pain may also be caused by heart problems. Be sure to have all chest pain evaluated by a healthcare provider.   When you have a reflux problem  After you eat, food travels from your mouth down the esophagus to your stomach. Along the way, food passes through a one-way valve called the lower esophageal sphincter (LES). The LES sits at the opening to your stomach. Normally the LES opens when you swallow. It lets food enter the stomach, then closes quickly. With GERD, the LES doesn t work normally. It lets food and stomach acid flow back (reflux) into the esophagus.  Some common symptoms    Frequent heartburn or burping    Sour-tasting fluid backing up into your mouth    Symptoms that get worse after you eat, bend over, or lie down    Trouble swallowing or pain when swallowing    A dry, long-term (chronic) cough    Upset stomach (nausea) or vomiting  Relieving your discomfort  You and your healthcare provider can work together to find the treatment options that best ease your symptoms. These may include lifestyle changes, medicine, and possibly surgery.  Many people find their GERD symptoms decrease when they eat small frequent meals instead of 3 large ones. Reducing the amount of fatty foods in your diet will also help.   The following foods tend to cause problems for people diagnosed with GERD:    Tomatoes and tomato products    Alcohol    Coffee    Peppermint    Greasy or spicy foods  Talk with your provider if you don t understand how to make the  dietary changes needed to control your GERD symptoms. Your provider can refer you to a nutritionist.  Date Last Reviewed: 7/1/2016 2000-2019 [employer]. [address] [place] [address], [place] [address]. All rights reserved. This information is not intended as a substitute for professional medical care. Always follow your healthcare professional's instructions.           Patient Education     GERD (Adult)    The esophagus is a tube that carries food from the mouth to the stomach. A valve (the LES, lower esophageal sphincter) at the lower end of the esophagus prevents stomach acid from flowing upward. When this valve doesn't work properly, stomach contents may repeatedly flow back up (reflux) into the esophagus. This is called gastroesophageal reflux disease (GERD). GERD can irritate the esophagus. It can cause problems with pain, swallowing or breathing. In severe cases, GERD can cause recurrent pneumonia (from aspiration or breathing in particles) or other serious problems.  Symptoms of reflux include burning, pressure or sharp pain in the upper abdomen or mid to lower chest. The pain can spread to the neck, back, or shoulder. There may be belching, an acid taste in the back of the throat, chronic cough, or sore throat, or hoarseness. GERD symptoms often occur during the day after a big meal. They can also occur at night when lying down.   Home care  Lifestyle changes can help reduce symptoms. If needed, your healthcare provider may prescribe medicines. Symptoms often improve with treatment, but if treatment is stopped, the symptoms often return after a few months. So most persons with GERD will need to continue treatment or get treatment on and off.  Lifestyle changes    Limit or avoid fatty, fried, and spicy foods, as well as coffee, chocolate, mint, and foods with high acid content such as tomatoes and citrus fruit and juices (orange, grapefruit, lemon).    Don t eat large meals, especially at night.  "Frequent, smaller meals are best. Don't lie down right after eating. And don t eat anything 3 hours before going to bed.    Don't drink alcohol or smoke. As much as possible, stay away from second hand smoke.    If you are overweight, losing weight will reduce symptoms.     Don't wear tight clothing around your stomach area.    If your symptoms occur during sleep, use a foam wedge to elevate your upper body (not just your head.) Or, place 4\" blocks under the head of your bed. Or use 2 bed risers under your bedframe.  Medicines  If needed, medicines can help relieve the symptoms of GERD and prevent damage to the esophagus. Discuss a medicine plan with your healthcare provider. This may include one or more of the following medicines:    Antacids to help neutralize the normal acids in your stomach.    Acid blockers (Histamine or H2 blockers) to decrease acid production.    Acid inhibitors (proton pump inhibitors PPIs) to decrease acid production in a different way than the blockers. They may work better, but can take a little longer to take effect.  Take an antacid 30 to 60 minutes after eating and at bedtime, but not at the same time as an acid blocker.  Try not to take medicines such as ibuprofen and aspirin. If you are taking aspirin for your heart or other medical reasons, talk to your healthcare provider about stopping it.  Follow-up care  Follow up with your healthcare provider or as advised by our staff.  When to seek medical advice  Call your healthcare provider if any of the following occur:    Stomach pain gets worse or moves to the lower right abdomen (appendix area)    Chest pain appears or gets worse, or spreads to the back, neck, shoulder, or arm    An over-the-counter trial of medicine doesn't relieve your symptoms    Weight loss that can't be explained    Trouble or pain swallowing    Frequent vomiting (can t keep down liquids)    Blood in the stool or vomit (red or black in color)    Feeling weak or " dizzy    Fever of 100.4 F (38 C) or higher, or as directed by your healthcare provider  Date Last Reviewed: 3/1/2018    1194-9819 [employer]. [address] [place] [address], [place] [address]. All rights reserved. This information is not intended as a substitute for professional medical care. Always follow your healthcare professional's instructions.

## 2020-07-25 NOTE — PROGRESS NOTES
Subjective     Jackie Medeiros is a 25 year old male who presents to clinic today for the following health issues:    HPI   History of acid reflux.  Patient reports been having episodes of nausea, stomach upset.  Unable to work for the past few weeks.    He reports this morning he had an episode of nausea stomach upset where he threw up.  He does report eating a lot of spicy food, acidic food.    He denies fever, chills, denies constipation or diarrhea.  Has no weight loss.  He has no blood in his urine or stool.  Denies being lightheaded or dizzy.    Patient Active Problem List   Diagnosis     GERD (gastroesophageal reflux disease)     Abnormal laboratory test     Environmental allergies     Hyperlipidemia LDL goal <130     Anemia     Vitamin D deficiency     History reviewed. No pertinent surgical history.    Social History     Tobacco Use     Smoking status: Never Smoker     Smokeless tobacco: Never Used   Substance Use Topics     Alcohol use: No     Family History   Problem Relation Age of Onset     Family History Negative No family hx of          Current Outpatient Medications   Medication Sig Dispense Refill     omeprazole (PRILOSEC OTC) 20 MG EC tablet Take 1 tablet (20 mg) by mouth daily 90 tablet 1     ondansetron (ZOFRAN) 4 MG tablet Take 1 tablet (4 mg) by mouth every 8 hours as needed for nausea 20 tablet 0     cetirizine (ZYRTEC) 10 MG tablet Take 10 mg by mouth daily       Allergies   Allergen Reactions     Dust Mites      Minocycline Hives       Reviewed and updated as needed this visit by Provider  Problems  Med Hx  Surg Hx         Review of Systems   Constitutional, HEENT, cardiovascular, pulmonary, GI, , musculoskeletal, neuro, skin, endocrine and psych systems are negative, except as otherwise noted.      Objective    /68   Pulse 89   Temp 98.5  F (36.9  C) (Tympanic)   Wt 78.9 kg (174 lb)   SpO2 98%   BMI 24.97 kg/m    Body mass index is 24.97 kg/m .  Physical Exam   GENERAL:  "healthy, alert and no distress  NECK: no adenopathy, no asymmetry, masses, or scars and thyroid normal to palpation  RESP: lungs clear to auscultation - no rales, rhonchi or wheezes  CV: regular rate and rhythm, normal S1 S2, no S3 or S4, no murmur, click or rub, no peripheral edema and peripheral pulses strong  ABDOMEN: tenderness epigastric and bowel sounds normal  MS: no gross musculoskeletal defects noted, no edema  NEURO: Normal strength and tone, mentation intact and speech normal  PSYCH: mentation appears normal, affect normal/bright    Diagnostic Test Results:  Labs reviewed in Epic  none         Assessment & Plan       ICD-10-CM    1. Gastroesophageal reflux disease without esophagitis  K21.9 omeprazole (PRILOSEC OTC) 20 MG EC tablet     ondansetron (ZOFRAN) 4 MG tablet     Advised with diet modifications, avoid spicy food, acidic food, late meals  Take medications as been prescribed.  Given a note for work    BMI:   Estimated body mass index is 24.97 kg/m  as calculated from the following:    Height as of 2/12/20: 1.778 m (5' 10\").    Weight as of this encounter: 78.9 kg (174 lb).   Weight management plan: Discussed healthy diet and exercise guidelines        Patient Instructions       Patient Education     What Is GERD?     With GERD, the weak LES allows food and fluids to travel back, or reflux, into the esophagus.      If you often have a painful burning feeling in your chest after you eat, you may have gastroesophageal reflux disease (GERD). Heartburn that keeps coming back is a classic symptom of GERD. But you may have other symptoms as well. A GERD diagnosis is made only after a complete evaluation by your healthcare provider.  Note: Chest pain may also be caused by heart problems. Be sure to have all chest pain evaluated by a healthcare provider.   When you have a reflux problem  After you eat, food travels from your mouth down the esophagus to your stomach. Along the way, food passes through a " one-way valve called the lower esophageal sphincter (LES). The LES sits at the opening to your stomach. Normally the LES opens when you swallow. It lets food enter the stomach, then closes quickly. With GERD, the LES doesn t work normally. It lets food and stomach acid flow back (reflux) into the esophagus.  Some common symptoms    Frequent heartburn or burping    Sour-tasting fluid backing up into your mouth    Symptoms that get worse after you eat, bend over, or lie down    Trouble swallowing or pain when swallowing    A dry, long-term (chronic) cough    Upset stomach (nausea) or vomiting  Relieving your discomfort  You and your healthcare provider can work together to find the treatment options that best ease your symptoms. These may include lifestyle changes, medicine, and possibly surgery.  Many people find their GERD symptoms decrease when they eat small frequent meals instead of 3 large ones. Reducing the amount of fatty foods in your diet will also help.   The following foods tend to cause problems for people diagnosed with GERD:    Tomatoes and tomato products    Alcohol    Coffee    Peppermint    Greasy or spicy foods  Talk with your provider if you don t understand how to make the dietary changes needed to control your GERD symptoms. Your provider can refer you to a nutritionist.  Date Last Reviewed: 7/1/2016 2000-2019 The [employer]. [address] [place] [address], [place] [address]. All rights reserved. This information is not intended as a substitute for professional medical care. Always follow your healthcare professional's instructions.           Patient Education     GERD (Adult)    The esophagus is a tube that carries food from the mouth to the stomach. A valve (the LES, lower esophageal sphincter) at the lower end of the esophagus prevents stomach acid from flowing upward. When this valve doesn't work properly, stomach contents may repeatedly flow back up (reflux) into the esophagus. This is  "called gastroesophageal reflux disease (GERD). GERD can irritate the esophagus. It can cause problems with pain, swallowing or breathing. In severe cases, GERD can cause recurrent pneumonia (from aspiration or breathing in particles) or other serious problems.  Symptoms of reflux include burning, pressure or sharp pain in the upper abdomen or mid to lower chest. The pain can spread to the neck, back, or shoulder. There may be belching, an acid taste in the back of the throat, chronic cough, or sore throat, or hoarseness. GERD symptoms often occur during the day after a big meal. They can also occur at night when lying down.   Home care  Lifestyle changes can help reduce symptoms. If needed, your healthcare provider may prescribe medicines. Symptoms often improve with treatment, but if treatment is stopped, the symptoms often return after a few months. So most persons with GERD will need to continue treatment or get treatment on and off.  Lifestyle changes    Limit or avoid fatty, fried, and spicy foods, as well as coffee, chocolate, mint, and foods with high acid content such as tomatoes and citrus fruit and juices (orange, grapefruit, lemon).    Don t eat large meals, especially at night. Frequent, smaller meals are best. Don't lie down right after eating. And don t eat anything 3 hours before going to bed.    Don't drink alcohol or smoke. As much as possible, stay away from second hand smoke.    If you are overweight, losing weight will reduce symptoms.     Don't wear tight clothing around your stomach area.    If your symptoms occur during sleep, use a foam wedge to elevate your upper body (not just your head.) Or, place 4\" blocks under the head of your bed. Or use 2 bed risers under your bedframe.  Medicines  If needed, medicines can help relieve the symptoms of GERD and prevent damage to the esophagus. Discuss a medicine plan with your healthcare provider. This may include one or more of the following " medicines:    Antacids to help neutralize the normal acids in your stomach.    Acid blockers (Histamine or H2 blockers) to decrease acid production.    Acid inhibitors (proton pump inhibitors PPIs) to decrease acid production in a different way than the blockers. They may work better, but can take a little longer to take effect.  Take an antacid 30 to 60 minutes after eating and at bedtime, but not at the same time as an acid blocker.  Try not to take medicines such as ibuprofen and aspirin. If you are taking aspirin for your heart or other medical reasons, talk to your healthcare provider about stopping it.  Follow-up care  Follow up with your healthcare provider or as advised by our staff.  When to seek medical advice  Call your healthcare provider if any of the following occur:    Stomach pain gets worse or moves to the lower right abdomen (appendix area)    Chest pain appears or gets worse, or spreads to the back, neck, shoulder, or arm    An over-the-counter trial of medicine doesn't relieve your symptoms    Weight loss that can't be explained    Trouble or pain swallowing    Frequent vomiting (can t keep down liquids)    Blood in the stool or vomit (red or black in color)    Feeling weak or dizzy    Fever of 100.4 F (38 C) or higher, or as directed by your healthcare provider  Date Last Reviewed: 3/1/2018    3263-9387 [employer]. [address] [place] [address], [place] [address]. All rights reserved. This information is not intended as a substitute for professional medical care. Always follow your healthcare professional's instructions.               No follow-ups on file.    [facility] Ur[facility] Provider  [facility]

## 2020-08-03 ENCOUNTER — OFFICE VISIT (OUTPATIENT)
Dept: URGENT CARE | Facility: URGENT CARE | Age: 25
End: 2020-08-03
Payer: COMMERCIAL

## 2020-08-03 ENCOUNTER — TELEPHONE (OUTPATIENT)
Dept: FAMILY MEDICINE | Facility: CLINIC | Age: 25
End: 2020-08-03

## 2020-08-03 VITALS
TEMPERATURE: 98.3 F | DIASTOLIC BLOOD PRESSURE: 68 MMHG | RESPIRATION RATE: 16 BRPM | BODY MASS INDEX: 24.91 KG/M2 | WEIGHT: 174 LBS | HEART RATE: 56 BPM | HEIGHT: 70 IN | OXYGEN SATURATION: 98 % | SYSTOLIC BLOOD PRESSURE: 107 MMHG

## 2020-08-03 DIAGNOSIS — L70.9 ACNE, UNSPECIFIED ACNE TYPE: Primary | ICD-10-CM

## 2020-08-03 DIAGNOSIS — J30.2 SEASONAL ALLERGIC RHINITIS, UNSPECIFIED TRIGGER: ICD-10-CM

## 2020-08-03 PROCEDURE — 99214 OFFICE O/P EST MOD 30 MIN: CPT | Performed by: NURSE PRACTITIONER

## 2020-08-03 RX ORDER — CETIRIZINE HYDROCHLORIDE 10 MG/1
10 TABLET ORAL DAILY
Qty: 30 TABLET | Refills: 0 | Status: SHIPPED | OUTPATIENT
Start: 2020-08-03 | End: 2020-12-04

## 2020-08-03 RX ORDER — TRETINOIN 0.25 MG/G
CREAM TOPICAL AT BEDTIME
Qty: 45 G | Refills: 0 | Status: SHIPPED | OUTPATIENT
Start: 2020-08-03 | End: 2022-09-14

## 2020-08-03 RX ORDER — CEPHALEXIN 500 MG/1
500 CAPSULE ORAL 2 TIMES DAILY
Qty: 28 CAPSULE | Refills: 0 | Status: SHIPPED | OUTPATIENT
Start: 2020-08-03 | End: 2020-08-17

## 2020-08-03 ASSESSMENT — ENCOUNTER SYMPTOMS
DIARRHEA: 0
HEADACHES: 1
FEVER: 0
LIGHT-HEADEDNESS: 0
DIZZINESS: 0
VOMITING: 0
SORE THROAT: 0
NAUSEA: 1
COUGH: 0
RHINORRHEA: 0

## 2020-08-03 ASSESSMENT — MIFFLIN-ST. JEOR: SCORE: 1770.51

## 2020-08-03 NOTE — TELEPHONE ENCOUNTER
Left message on answering machine for patient to call back.  Deborah DUARTERN BSN  [facility]  [phone]

## 2020-08-03 NOTE — TELEPHONE ENCOUNTER
Jackie called to scheduled an appt to acne, he has appt at [facility] but he needs sooner if possib at [facility].

## 2020-08-03 NOTE — PROGRESS NOTES
SUBJECTIVE:   Jackie Medeiros is a 25 year old male presenting with a chief complaint of   Chief Complaint   Patient presents with     Urgent Care     Derm Problem     Pt states that he has been having painful acne on the right side and now spreading to the left        He is an established patient of [facility].       Rash    Onset of rash was 2 week(s) ago.   Course of illness is worsening.  Severity moderate  Current and Associated symptoms: painful and red   Location of the rash: face.  Previous history of a similar rash? Yes  Recent exposure history: none known  Denies exposure to: dietary change, environmental allergens, medications, new household products and new skincare products  Associated symptoms include: nothing.  Treatment measures tried include: none        Review of Systems   Constitutional: Negative for fever.   HENT: Negative for congestion, rhinorrhea and sore throat.    Respiratory: Negative for cough.    Gastrointestinal: Positive for nausea. Negative for diarrhea and vomiting.   Skin: Positive for rash.   Neurological: Positive for headaches. Negative for dizziness and light-headedness.       Past Medical History:   Diagnosis Date     Abnormal laboratory test 1/1/2012     Allergic state      Anemia 8/6/2013     Environmental allergies 1/6/2013     Vitamin D deficiency 1/24/14    vitamin D level 23     Family History   Problem Relation Age of Onset     Family History Negative No family hx of      Current Outpatient Medications   Medication Sig Dispense Refill     benzoyl peroxide 5 % external liquid Apply daily, can increase to 2-3 times a day if needed, If excessive dryness or peeling occurs reduce amount used. 226 g 0     cephALEXin (KEFLEX) 500 MG capsule Take 1 capsule (500 mg) by mouth 2 times daily for 14 days 28 capsule 0     cetirizine (ZYRTEC) 10 MG tablet Take 1 tablet (10 mg) by mouth daily 30 tablet 0     omeprazole (PRILOSEC OTC) 20 MG EC tablet Take 1 tablet (20 mg) by mouth daily  "90 tablet 1     tretinoin (RETIN-A) 0.025 % external cream Apply topically At Bedtime 45 g 0     cetirizine (ZYRTEC) 10 MG tablet Take 10 mg by mouth daily       ondansetron (ZOFRAN) 4 MG tablet Take 1 tablet (4 mg) by mouth every 8 hours as needed for nausea (Patient not taking: Reported on 8/3/2020) 20 tablet 0     Social History     Tobacco Use     Smoking status: Never Smoker     Smokeless tobacco: Never Used   Substance Use Topics     Alcohol use: No       OBJECTIVE  /68   Pulse 56   Temp 98.3  F (36.8  C) (Oral)   Resp 16   Ht 1.778 m (5' 10\")   Wt 78.9 kg (174 lb)   SpO2 98%   BMI 24.97 kg/m      Physical Exam  Vitals signs and nursing note reviewed.   Constitutional:       Appearance: Normal appearance. He is well-groomed.   HENT:      Head: Normocephalic and atraumatic.   Eyes:      Extraocular Movements: Extraocular movements intact.      Conjunctiva/sclera: Conjunctivae normal.      Pupils: Pupils are equal, round, and reactive to light.   Neck:      Musculoskeletal: Normal range of motion and neck supple.   Cardiovascular:      Rate and Rhythm: Normal rate and regular rhythm.      Heart sounds: Normal heart sounds.   Pulmonary:      Effort: Pulmonary effort is normal.      Breath sounds: Normal breath sounds and air entry.   Lymphadenopathy:      Cervical: No cervical adenopathy.   Skin:     General: Skin is warm and dry.      Comments: Mixture of comedonal and papulopustular acne to face.    Neurological:      Mental Status: He is alert and oriented to person, place, and time.      GCS: GCS eye subscore is 4. GCS verbal subscore is 5. GCS motor subscore is 6.   Psychiatric:         Behavior: Behavior is cooperative.           ASSESSMENT:      ICD-10-CM    1. Acne, unspecified acne type  L70.9 tretinoin (RETIN-A) 0.025 % external cream     benzoyl peroxide 5 % external liquid     cephALEXin (KEFLEX) 500 MG capsule   2. Seasonal allergic rhinitis, unspecified trigger  J30.2 cetirizine " (ZYRTEC) 10 MG tablet        Medical Decision Making:    Differential Diagnosis:  Rash: Acne  Cellulitis  Dermatitis  Impetigo  Inflammation    Serious Comorbid Conditions:  Adult:  None    PLAN:  Discussed with patient that will start him on cephalexin twice a day for 14 days (chose cephalexin given allergy to minocycline). Will also have him start doing tretinoin daily at bedtime. Can also start benzoyl peroxide daily and then can increased to 2-3 times per day, but if would get to dry then decrease amount of use. Additional symptomatic treatment recommendations discussed. Education was added to AVS. Patient was agreeable to plan and verbalized understanding.     Followup:    If not improving in 2 weeks or if condition worsens, follow up with your Primary Care Provider    Patient Instructions   Cephalexin twice a day for 14 days  Tretinoin at bedtime  Benzoyl peroxide apply daily and can increased to 2-3 times a day      Patient Education     Adult Acne    If your skin is erupting with blemishes that you thought could only affect a teenager, you may have adult acne. This is acne in people over the age of 25. Acne in teenagers is more common in teen boys. Acne in adults is more common in women.  Acne is the term for oil-clogged pores. Pores are tiny openings on the skin that become inflamed and form blemishes. Adult acne blemishes show up mainly on the face. In women, blemishes tend to show up around the chin, mouth, jawline, and neck. In men, acne often affects the entire face. But the trunk and upper arms can also be involved.  What causes acne?  Male hormones (androgens) may cause acne in some people. Women with certain conditions such as polycystic ovarian syndrome may make too much male hormones. Acne in women often may happen just before their menstrual periods. If you had acne when you were a teenager or if others in your family have had acne, you may be at more risk for adult acne. The good news is that  acne can be treated. Treatments can also decrease the scarring and changes to skin color caused by acne.  Types of acne  Acne happens when certain hair follicles are damaged. One or more of 4 things happen:    The hair follicle is blocked by dead cells and oil (sebum)    The follicle makes more oil (sebum) than normal    Bacteria (P. acnes) grow in the follicle    The follicle becomes irritated (inflamed)  Four types of blemishes can appear:    Whiteheads are round, white blemishes that form when hair follicles become clogged.    Blackheads are round, dark blemishes that form when whiteheads reach the skin s surface and touch air.    Pimples are red, swollen bumps that form when plugged follicle walls break near the skin s surface.    Deep cysts are pus-filled pimples. They form when plugged follicle walls break deep within the skin. Acne cysts are often large and painful. In some cases, they also cause scars.  How treatment can help  The goals of treatment are to keep new acne blemishes from forming and to prevent scarring and changes in skin color. You will usually need a combination of treatments. You may need to use a treatment for at least 2 months to see if it works. This is because acne lesions take at least 8 weeks to develop.  Your treatment will depend on how serious your acne is. You and your healthcare provider can discuss the best way to treat and control it. In most cases, acne treatment includes:    Good skin care that doesn t damage or irritate skin    Medicines put on the skin (topical)    Antibiotics, hormones, or both  Often you will need to take several medicines at first. For some treatments, women must use a birth control method so they won t get pregnant while being treated.  Your healthcare provider may also remove blemishes or give you injections. If you have acne scars, you may need surgery or medicines to help improve the way your skin looks. Be sure you understand your treatment plan and  any side effects it might cause. You will play an important role in the success of your treatment.  Date Last Reviewed: 2/1/2017 2000-2019 [employer]. [address] [place] [address], [place] [address]. All rights reserved. This information is not intended as a substitute for professional medical care. Always follow your healthcare professional's instructions.

## 2020-08-03 NOTE — TELEPHONE ENCOUNTER
Appointment rescheduled.  Deborah DUARTERN BSN  [facility]  [phone]

## 2020-08-03 NOTE — PATIENT INSTRUCTIONS
Cephalexin twice a day for 14 days  Tretinoin at bedtime  Benzoyl peroxide apply daily and can increased to 2-3 times a day      Patient Education     Adult Acne    If your skin is erupting with blemishes that you thought could only affect a teenager, you may have adult acne. This is acne in people over the age of 25. Acne in teenagers is more common in teen boys. Acne in adults is more common in women.  Acne is the term for oil-clogged pores. Pores are tiny openings on the skin that become inflamed and form blemishes. Adult acne blemishes show up mainly on the face. In women, blemishes tend to show up around the chin, mouth, jawline, and neck. In men, acne often affects the entire face. But the trunk and upper arms can also be involved.  What causes acne?  Male hormones (androgens) may cause acne in some people. Women with certain conditions such as polycystic ovarian syndrome may make too much male hormones. Acne in women often may happen just before their menstrual periods. If you had acne when you were a teenager or if others in your family have had acne, you may be at more risk for adult acne. The good news is that acne can be treated. Treatments can also decrease the scarring and changes to skin color caused by acne.  Types of acne  Acne happens when certain hair follicles are damaged. One or more of 4 things happen:    The hair follicle is blocked by dead cells and oil (sebum)    The follicle makes more oil (sebum) than normal    Bacteria (P. acnes) grow in the follicle    The follicle becomes irritated (inflamed)  Four types of blemishes can appear:    Whiteheads are round, white blemishes that form when hair follicles become clogged.    Blackheads are round, dark blemishes that form when whiteheads reach the skin s surface and touch air.    Pimples are red, swollen bumps that form when plugged follicle walls break near the skin s surface.    Deep cysts are pus-filled pimples. They form when plugged follicle  walls break deep within the skin. Acne cysts are often large and painful. In some cases, they also cause scars.  How treatment can help  The goals of treatment are to keep new acne blemishes from forming and to prevent scarring and changes in skin color. You will usually need a combination of treatments. You may need to use a treatment for at least 2 months to see if it works. This is because acne lesions take at least 8 weeks to develop.  Your treatment will depend on how serious your acne is. You and your healthcare provider can discuss the best way to treat and control it. In most cases, acne treatment includes:    Good skin care that doesn t damage or irritate skin    Medicines put on the skin (topical)    Antibiotics, hormones, or both  Often you will need to take several medicines at first. For some treatments, women must use a birth control method so they won t get pregnant while being treated.  Your healthcare provider may also remove blemishes or give you injections. If you have acne scars, you may need surgery or medicines to help improve the way your skin looks. Be sure you understand your treatment plan and any side effects it might cause. You will play an important role in the success of your treatment.  Date Last Reviewed: 2/1/2017 2000-2019 [employer]. [address] [place] [address], [place] [address]. All rights reserved. This information is not intended as a substitute for professional medical care. Always follow your healthcare professional's instructions.

## 2020-08-06 ENCOUNTER — OFFICE VISIT (OUTPATIENT)
Dept: FAMILY MEDICINE | Facility: CLINIC | Age: 25
End: 2020-08-06
Payer: COMMERCIAL

## 2020-08-06 VITALS — SYSTOLIC BLOOD PRESSURE: 110 MMHG | DIASTOLIC BLOOD PRESSURE: 68 MMHG

## 2020-08-06 DIAGNOSIS — L70.0 ACNE VULGARIS: Primary | ICD-10-CM

## 2020-08-06 DIAGNOSIS — L81.0 POST-INFLAMMATORY HYPERPIGMENTATION: ICD-10-CM

## 2020-08-06 PROCEDURE — 99214 OFFICE O/P EST MOD 30 MIN: CPT | Performed by: PHYSICIAN ASSISTANT

## 2020-08-06 RX ORDER — CLINDAMYCIN PHOSPHATE 10 MG/G
GEL TOPICAL
Qty: 60 G | Refills: 3 | Status: SHIPPED | OUTPATIENT
Start: 2020-08-06 | End: 2022-09-14

## 2020-08-06 RX ORDER — AMOXICILLIN 500 MG/1
TABLET, FILM COATED ORAL
Qty: 60 TABLET | Refills: 2 | Status: SHIPPED | OUTPATIENT
Start: 2020-08-06 | End: 2022-09-14

## 2020-08-06 NOTE — PATIENT INSTRUCTIONS
Proper skin care from [facility]:    -Eliminate harsh soaps as they strip the natural oils from the skin, often resulting in dry itchy skin ( i.e. Dial, Zest, Jyotsna Spring)  -Use mild soaps such as Cetaphil or Dove Sensitive Skin in the shower. You do not need to use soap on arms, legs, and trunk every time you shower unless visibly soiled.   -Avoid hot or cold showers.  -After showering, lightly dry off and apply moisturizing within 2-3 minutes. This will help trap moisture in the skin.   -Aggressive use of a moisturizer at least 1-2 times a day to the entire body (including -Vanicream, Cetaphil, Aquaphor or Cerave) and moisturize hands after every washing.  -We recommend using moisturizers that come in a tub that needs to be scooped out, not a pump. This has more of an oil base. It will hold moisture in your skin much better than a water base moisturizer. The above recommended are non-pore clogging.      Wear a sunscreen with at least SPF 30 on your face, ears, neck and V of the chest daily. Wear sunscreen on other areas of the body if those areas are exposed to the sun throughout the day. Sunscreens can contain physical and/or chemical blockers. Physical blockers are less likely to clog pores, these include zinc oxide and titanium dioxide. Reapply every two hour and after swimming. Sunscreen examples include Neutrogena, CeraVe, Blue Lizard, Elta MD and many others.    UV radiation  UVA radiation remains constant throughout the day and throughout the year. It is a longer wavelength than UVB and therefore penetrates deeper into the skin leading to immediate and delayed tanning, photoaging, and skin cancer. 70-80% of UVA and UVB radiation occurs between the hours of 10am-2pm.  UVB radiation  UVB radiation causes the most harmful effects and is more significant during the summer months. However, snow and ice can reflect UVB radiation leading to skin damage during the winter months as well. UVB radiation is  responsible for tanning, burning, inflammation, delayed erythema (pinkness), pigmentation (brown spots), and skin cancer.     Acne vulgaris, PIH    Morning regimen:    Wash with either a gentle cleanser such as Cetaphil gentle cleanser or Benzoyl peroxide wash 5%  Apply clindamcyin  Apply a gentle moisturizer*  Take cephalexin with food for the next 10 days. Then start amoxicillin. Do not take cephalexin and amoxicllin at the same time.     Evening regimen:    Wash with either a gentle cleanser such as Cetaphil gentle cleanser or Benzoyl peroxide wash 5%  Apply tretinoin every other night for 2 weeks, working up to nightly use  Apply a gentle moisturizer (do not need sunscreen at night)  Take cephalexin with food for the next 10 days. Then start amoxicillin. Do not take cephalexin and amoxicllin at the same time.     *Facial moisturizer (preferably with an SPF of 30 or greater) such as Cetaphil, CeraVe, or Vanicream. Make sure lotion is non-comedogenic. Sunscreen active ingredients: Physical blockers are less likely to clog pores. Examples include titanium dioxide and zinc oxide  Good body moisturizers include Cetaphil, CeraVe, Eucerin, and Vanicream.    Disc Tretinoin at bedtime, dryness, irritation and way to prevent discussed with patient   Benzoyl Peroxide (BPO) wash daily or every other day depending on dryness  (2.5%-5% BPO on face and 5%-10% on chest and back)  Aggressive use of bland emollients discussed with patient   If taking Doxycycline take with food but avoid calcium-rich foods as this can reduce the efficacy of Doxycycline. Side effects of doxycyline GI upset, esophagitis, and sun sensitivity.   Potential side effects of oral antibiotic N/V/D, rash, allergic reaction, pigment changes, and dizziness.     May take 2-3 months to see 50-70% improvement  May get worse during initial phase of treatment    Pathophysiology discussed with patient and information provided.  I discussed with patient oral versus  topical treatments such as oral antibiotics, Spironolactone (Aldactone)(women only),oral contraceptive pills (women only) topical creams,  and over-the-counter treatments.     Acne can be effectively treated, although response may sometimes be slow.   Where possible, avoid excessively humid conditions such as a sauna, working in an unventilated kitchen or tropical vacations.   If you smoke, stop. Nicotine increases sebum retention and increased scale within the follicles, forming comedones (black and whiteheads).    Minimize the application of oils and cosmetics to the affected skin.  Abrasive skin treatments can aggravate acne.   Try not to scratch or pick the spots as this can increase your risk for permanent scarring in the area.   To avoid sunburn, protect your skin outdoors using a broad spectrum (UVB and UVA) sunscreen and protective clothing.  No relationship between particular foods and acne has been proven. However, reports suggest low glycemic and low dairy diet are helpful for some people.    ACNE INFORMATION     Acne is a skin disease affecting oil glands and hair follicles in your skin.  When these pores do not drain properly, hair follicles can becomes clogged and bacteria can be trapped causing inflammation to occur. Clinical manifestations range from mild to severe, such as comedones (whiteheads and blackheads) or cysts.     Several factors contribute to acne:     1. Hormonal- Androgen (a type of hormone) can cause oil glands to enlarges and produces more sebum (oil).    2. Bacterial- A specific type of bacteria called Propionibacterium acnes are found in these oily follicles and stimulate more inflammation.     3. Genetics- History of family members (parents or siblings)     4. External factors- mechanical trauma, cosmetics, topical steroids or some oral medications.      Combination therapy is the mainstay of treatment given the multiple factors contributing to acne.  The type of treatment is also  dependant on whether you are pregnancy or breastfeeding.     TOPICAL TREATMENTS   Topical Antibiotics These medications help prevent growth of bacteria in your pores.   Topical Exfoliating Agent These are drying agents that will help normalize oil production, unplug pores and reduce bacterial growth. (Note: Make sure you discontinue this medication ONE week prior to waxing or your skin may lift.)     ORAL TREATMENTS   Oral Antibiotics: Tetracyclines are the most commonly used oral antibiotics to treat moderate to severe acne. They are safe to take for months at a time. Some side effects to these medications include: sun sensitivity, stomach upset, dizziness and heartburn. If you experience a sudden onset of rash or severe unusual headache, discontinue the medication and contact your clinician immediately.     Spironolactone: This oral medication blocks androgens (hormones that can aggravate acne).  Since this medication is also used as a diuretic, baseline blood work is needed to check your electrolytes and liver function. Do not get pregnant while on this medication as it can cause birth defects. Make sure to drink plenty of water.    Birth Control Pills (Females only). In order to decrease hormonal fluctuations that affect acne, birth control pills such as Enma  or Heather  ( and others) can be effective in treating acne.  We advise you to discuss with your OB/GYN or Primary Care Doctor to be screened and to check if you are eligible to be on this pill.     Accutane  (generic: isotretinoin). This oral medication is a retinoid (Vitamin A derivative like Retin-A) used to treat severe acne that does not respond to the above medications. Accutane  can  help clear acne for years and the average period of treatment is five to seven months, however, the duration of treatment may be adjusted based on severity. Some people may need more than one treatment.     ALTERNATIVE TREATMENTS   Microdermabrasion & light chemical peels  help improve skin texture, decrease pore size, decrease mild scarring & increase absorption of your topical treatments     Blue Spectrum Light Therapy consists of a concentrated visible light that kills bacteria in the oil ducts. It is safe for pregnancy & nursing. At times, ALA (a topical solution) may be applied prior to the Blue Light exposure in order to enhance light penetration.     Pulse Dye Laser (PDL) decreases inflammation and improves certain acne scars.     Recommended facial cleansers, moisturizers & cosmetics:   Cetaphil  CeraVe  (Note: Look for  non-comeodogenic  cleansers & moisturizers)   Clinique , Prescriptives  make-up

## 2020-08-06 NOTE — PROGRESS NOTES
HPI:  Jackie Medeiros is a 25 year old male patient here today for acne on face .  Patient states this has been present for since 2013.  Patient reports the following symptoms: breakout on cheeks .  Patient reports the following previous treatments: started cephalexin and tretinoin a few days ago. No changes yet..  Patient reports the following modifying factors: none.  Associated symptoms: none.  Patient has no other skin complaints today.  Remainder of the HPI, Meds, PMH, Allergies, FH, and SH was reviewed in chart.      Past Medical History:   Diagnosis Date     Abnormal laboratory test 1/1/2012     Allergic state      Anemia 8/6/2013     Environmental allergies 1/6/2013     Vitamin D deficiency 1/24/14    vitamin D level 23       History reviewed. No pertinent surgical history.     Family History   Problem Relation Age of Onset     Family History Negative No family hx of        Social History     Socioeconomic History     Marital status: Single     Spouse name: Not on file     Number of children: Not on file     Years of education: Not on file     Highest education level: Not on file   Occupational History     Not on file   Social Needs     Financial resource strain: Not on file     Food insecurity     Worry: Not on file     Inability: Not on file     Transportation needs     Medical: Not on file     Non-medical: Not on file   Tobacco Use     Smoking status: Never Smoker     Smokeless tobacco: Never Used   Substance and Sexual Activity     Alcohol use: No     Drug use: No     Sexual activity: Yes     Partners: Female   Lifestyle     Physical activity     Days per week: Not on file     Minutes per session: Not on file     Stress: Not on file   Relationships     Social connections     Talks on phone: Not on file     Gets together: Not on file     Attends Church service: Not on file     Active member of club or organization: Not on file     Attends meetings of clubs or organizations: Not on file      Relationship status: Not on file     Intimate partner violence     Fear of current or ex partner: Not on file     Emotionally abused: Not on file     Physically abused: Not on file     Forced sexual activity: Not on file   Other Topics Concern     Parent/sibling w/ CABG, MI or angioplasty before 65F 55M? Not Asked   Social History Narrative    Brother:    7 yrs older, in [place]    11 yrs younger        Sisters    5 yrs older    12 yrs older        Came to [place] In [place] from [place] via [place]        He is 1 yr younger.                   Outpatient Encounter Medications as of 8/6/2020   Medication Sig Dispense Refill     cephALEXin (KEFLEX) 500 MG capsule Take 1 capsule (500 mg) by mouth 2 times daily for 14 days 28 capsule 0     cetirizine (ZYRTEC) 10 MG tablet Take 1 tablet (10 mg) by mouth daily 30 tablet 0     cetirizine (ZYRTEC) 10 MG tablet Take 10 mg by mouth daily       omeprazole (PRILOSEC OTC) 20 MG EC tablet Take 1 tablet (20 mg) by mouth daily 90 tablet 1     tretinoin (RETIN-A) 0.025 % external cream Apply topically At Bedtime 45 g 0     benzoyl peroxide 5 % external liquid Apply daily, can increase to 2-3 times a day if needed, If excessive dryness or peeling occurs reduce amount used. (Patient not taking: Reported on 8/6/2020) 226 g 0     ondansetron (ZOFRAN) 4 MG tablet Take 1 tablet (4 mg) by mouth every 8 hours as needed for nausea (Patient not taking: Reported on 8/3/2020) 20 tablet 0     No facility-administered encounter medications on file as of 8/6/2020.        Review Of Systems:  Skin: acne  Eyes: negative  Ears/Nose/Throat: negative  Respiratory: No shortness of breath, dyspnea on exertion, cough, or hemoptysis  Cardiovascular: negative  Gastrointestinal: negative  Genitourinary: negative  Musculoskeletal: negative  Neurologic: negative  Psychiatric: negative  Hematologic/Lymphatic/Immunologic: negative  Endocrine: negative      Objective:     /68   Eyes: Conjunctivae/lids: Normal   ENT:  Lips:  Normal  MSK: Normal  Cardiovascular: Peripheral edema none  Pulm: Breathing Normal  Neuro/Psych: Orientation: A/O x 3 Normal; Mood/Affect: Normal, NAD, WDWN  Pt accompanied by: self  Following areas examined: face, neck, upper back,   Ujnior skin type:v   Findings:  Light brown/pink smooth macules of cheeks  Few inflammatory papules on cheeks  Assessment and Plan:  1) Acne vulgaris, PIH    Morning regimen:    Wash with either a gentle cleanser such as Cetaphil gentle cleanser or Benzoyl peroxide wash 5%  Apply clindamcyin  Apply a gentle moisturizer*  Take cephalexin with food for the next 10 days. Then start amoxicillin. Do not take cephalexin and amoxicllin at the same time.     Evening regimen:    Wash with either a gentle cleanser such as Cetaphil gentle cleanser or Benzoyl peroxide wash 5%  Apply tretinoin every other night for 2 weeks, working up to nightly use  Apply a gentle moisturizer (do not need sunscreen at night)  Take cephalexin with food for the next 10 days. Then start amoxicillin. Do not take cephalexin and amoxicllin at the same time.     *Facial moisturizer (preferably with an SPF of 30 or greater) such as Cetaphil, CeraVe, or Vanicream. Make sure lotion is non-comedogenic. Sunscreen active ingredients: Physical blockers are less likely to clog pores. Examples include titanium dioxide and zinc oxide  Good body moisturizers include Cetaphil, CeraVe, Eucerin, and Vanicream.    Disc Tretinoin at bedtime, dryness, irritation and way to prevent discussed with patient   Benzoyl Peroxide (BPO) wash daily or every other day depending on dryness  (2.5%-5% BPO on face and 5%-10% on chest and back)  Aggressive use of bland emollients discussed with patient   If taking Doxycycline take with food but avoid calcium-rich foods as this can reduce the efficacy of Doxycycline. Side effects of doxycyline GI upset, esophagitis, and sun sensitivity.   Potential side effects of oral antibiotic N/V/D, rash,  allergic reaction, pigment changes, and dizziness.     May take 2-3 months to see 50-70% improvement  May get worse during initial phase of treatment    Pathophysiology discussed with patient and information provided.  I discussed with patient oral versus topical treatments such as oral antibiotics, Spironolactone (Aldactone)(women only),oral contraceptive pills (women only) topical creams,  and over-the-counter treatments.     Acne can be effectively treated, although response may sometimes be slow.   Where possible, avoid excessively humid conditions such as a sauna, working in an unventilated kitchen or tropical vacations.   If you smoke, stop. Nicotine increases sebum retention and increased scale within the follicles, forming comedones (black and whiteheads).    Minimize the application of oils and cosmetics to the affected skin.  Abrasive skin treatments can aggravate acne.   Try not to scratch or pick the spots as this can increase your risk for permanent scarring in the area.   To avoid sunburn, protect your skin outdoors using a broad spectrum (UVB and UVA) sunscreen and protective clothing.  No relationship between particular foods and acne has been proven. However, reports suggest low glycemic and low dairy diet are helpful for some people.  Follow up in 2-3 months     1

## 2020-08-06 NOTE — LETTER
8/6/2020         RE: Jackie Medeiros  [address] [place] [address]        Dear Colleague,    Thank you for referring your patient, Jackie Medeiros, to the [facility] [place]. Please see a copy of my visit note below.    HPI:  Jackie Medeiros is a 25 year old male patient here today for acne on face .  Patient states this has been present for since 2013.  Patient reports the following symptoms: breakout on cheeks .  Patient reports the following previous treatments: started cephalexin and tretinoin a few days ago. No changes yet..  Patient reports the following modifying factors: none.  Associated symptoms: none.  Patient has no other skin complaints today.  Remainder of the HPI, Meds, PMH, Allergies, FH, and SH was reviewed in chart.      Past Medical History:   Diagnosis Date     Abnormal laboratory test 1/1/2012     Allergic state      Anemia 8/6/2013     Environmental allergies 1/6/2013     Vitamin D deficiency 1/24/14    vitamin D level 23       History reviewed. No pertinent surgical history.     Family History   Problem Relation Age of Onset     Family History Negative No family hx of        Social History     Socioeconomic History     Marital status: Single     Spouse name: Not on file     Number of children: Not on file     Years of education: Not on file     Highest education level: Not on file   Occupational History     Not on file   Social Needs     Financial resource strain: Not on file     Food insecurity     Worry: Not on file     Inability: Not on file     Transportation needs     Medical: Not on file     Non-medical: Not on file   Tobacco Use     Smoking status: Never Smoker     Smokeless tobacco: Never Used   Substance and Sexual Activity     Alcohol use: No     Drug use: No     Sexual activity: Yes     Partners: Female   Lifestyle     Physical activity     Days per week: Not on file     Minutes per session: Not on file     Stress: Not on file   Relationships     Social  connections     Talks on phone: Not on file     Gets together: Not on file     Attends Faith service: Not on file     Active member of club or organization: Not on file     Attends meetings of clubs or organizations: Not on file     Relationship status: Not on file     Intimate partner violence     Fear of current or ex partner: Not on file     Emotionally abused: Not on file     Physically abused: Not on file     Forced sexual activity: Not on file   Other Topics Concern     Parent/sibling w/ CABG, MI or angioplasty before 65F 55M? Not Asked   Social History Narrative    Brother:    7 yrs older, in [place]    11 yrs younger        Sisters    5 yrs older    12 yrs older        [place] from [place]        He is 1 yr younger.                   Outpatient Encounter Medications as of 8/6/2020   Medication Sig Dispense Refill     cephALEXin (KEFLEX) 500 MG capsule Take 1 capsule (500 mg) by mouth 2 times daily for 14 days 28 capsule 0     cetirizine (ZYRTEC) 10 MG tablet Take 1 tablet (10 mg) by mouth daily 30 tablet 0     cetirizine (ZYRTEC) 10 MG tablet Take 10 mg by mouth daily       omeprazole (PRILOSEC OTC) 20 MG EC tablet Take 1 tablet (20 mg) by mouth daily 90 tablet 1     tretinoin (RETIN-A) 0.025 % external cream Apply topically At Bedtime 45 g 0     benzoyl peroxide 5 % external liquid Apply daily, can increase to 2-3 times a day if needed, If excessive dryness or peeling occurs reduce amount used. (Patient not taking: Reported on 8/6/2020) 226 g 0     ondansetron (ZOFRAN) 4 MG tablet Take 1 tablet (4 mg) by mouth every 8 hours as needed for nausea (Patient not taking: Reported on 8/3/2020) 20 tablet 0     No facility-administered encounter medications on file as of 8/6/2020.        Review Of Systems:  Skin: acne  Eyes: negative  Ears/Nose/Throat: negative  Respiratory: No shortness of breath, dyspnea on exertion, cough, or hemoptysis  Cardiovascular: negative  Gastrointestinal:  negative  Genitourinary: negative  Musculoskeletal: negative  Neurologic: negative  Psychiatric: negative  Hematologic/Lymphatic/Immunologic: negative  Endocrine: negative      Objective:     /68   Eyes: Conjunctivae/lids: Normal   ENT: Lips:  Normal  MSK: Normal  Cardiovascular: Peripheral edema none  Pulm: Breathing Normal  Neuro/Psych: Orientation: A/O x 3 Normal; Mood/Affect: Normal, NAD, WDWN  Pt accompanied by: self  Following areas examined: face, neck, upper back,   Junior skin type:v   Findings:  Light brown/pink smooth macules of cheeks  Few inflammatory papules on cheeks  Assessment and Plan:  1) Acne vulgaris, PIH    Morning regimen:    Wash with either a gentle cleanser such as Cetaphil gentle cleanser or Benzoyl peroxide wash 5%  Apply clindamcyin  Apply a gentle moisturizer*  Take cephalexin with food for the next 10 days. Then start amoxicillin. Do not take cephalexin and amoxicllin at the same time.     Evening regimen:    Wash with either a gentle cleanser such as Cetaphil gentle cleanser or Benzoyl peroxide wash 5%  Apply tretinoin every other night for 2 weeks, working up to nightly use  Apply a gentle moisturizer (do not need sunscreen at night)  Take cephalexin with food for the next 10 days. Then start amoxicillin. Do not take cephalexin and amoxicllin at the same time.     *Facial moisturizer (preferably with an SPF of 30 or greater) such as Cetaphil, CeraVe, or Vanicream. Make sure lotion is non-comedogenic. Sunscreen active ingredients: Physical blockers are less likely to clog pores. Examples include titanium dioxide and zinc oxide  Good body moisturizers include Cetaphil, CeraVe, Eucerin, and Vanicream.    Disc Tretinoin at bedtime, dryness, irritation and way to prevent discussed with patient   Benzoyl Peroxide (BPO) wash daily or every other day depending on dryness  (2.5%-5% BPO on face and 5%-10% on chest and back)  Aggressive use of bland emollients discussed with patient    If taking Doxycycline take with food but avoid calcium-rich foods as this can reduce the efficacy of Doxycycline. Side effects of doxycyline GI upset, esophagitis, and sun sensitivity.   Potential side effects of oral antibiotic N/V/D, rash, allergic reaction, pigment changes, and dizziness.     May take 2-3 months to see 50-70% improvement  May get worse during initial phase of treatment    Pathophysiology discussed with patient and information provided.  I discussed with patient oral versus topical treatments such as oral antibiotics, Spironolactone (Aldactone)(women only),oral contraceptive pills (women only) topical creams,  and over-the-counter treatments.     Acne can be effectively treated, although response may sometimes be slow.   Where possible, avoid excessively humid conditions such as a sauna, working in an unventilated kitchen or tropical vacations.   If you smoke, stop. Nicotine increases sebum retention and increased scale within the follicles, forming comedones (black and whiteheads).    Minimize the application of oils and cosmetics to the affected skin.  Abrasive skin treatments can aggravate acne.   Try not to scratch or pick the spots as this can increase your risk for permanent scarring in the area.   To avoid sunburn, protect your skin outdoors using a broad spectrum (UVB and UVA) sunscreen and protective clothing.  No relationship between particular foods and acne has been proven. However, reports suggest low glycemic and low dairy diet are helpful for some people.  Follow up in 2-3 months      Again, thank you for allowing me to participate in the care of your patient.        Sincerely,        Brittny Singh PA-C

## 2020-08-11 ENCOUNTER — OFFICE VISIT (OUTPATIENT)
Dept: URGENT CARE | Facility: URGENT CARE | Age: 25
End: 2020-08-11
Payer: OTHER MISCELLANEOUS

## 2020-08-11 VITALS
HEART RATE: 69 BPM | OXYGEN SATURATION: 97 % | DIASTOLIC BLOOD PRESSURE: 67 MMHG | TEMPERATURE: 98.6 F | SYSTOLIC BLOOD PRESSURE: 108 MMHG

## 2020-08-11 DIAGNOSIS — M75.91 SUPRASPINATUS TENDONITIS, RIGHT: ICD-10-CM

## 2020-08-11 DIAGNOSIS — S63.501A SPRAIN OF RIGHT WRIST, INITIAL ENCOUNTER: Primary | ICD-10-CM

## 2020-08-11 PROCEDURE — 99213 OFFICE O/P EST LOW 20 MIN: CPT | Performed by: PHYSICIAN ASSISTANT

## 2020-08-11 ASSESSMENT — PAIN SCALES - GENERAL
PAINLEVEL: SEVERE PAIN (7)
PAINLEVEL: EXTREME PAIN (8)

## 2020-08-11 NOTE — LETTER
[facility]  [address]  [place] [address]  [phone]      August 11, 2020    RE:  Jackie Medeiros                                                                                                                                                       [address] [address]            To whom it may concern:    Jackie Medeiros was seen in my clinic. Excuse him from work on 8/2, 8/3, 8/8, 8/9.          Sincerely,      Anastacio Ravi PA-C    [place] [facility] [place]

## 2020-08-12 NOTE — PROGRESS NOTES
SUBJECTIVE:  Jackie is a 25 year old male who presents to urgent care with 9 days of right wrist and shoulder pain.  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  He states that some of the packages were inappropriately stacked on 1 of the belts and other coworkers seem to injure themselves moving these as well.  He states if he is able to rest by not working his symptoms are improving.  The pain is mainly with range of motion or lifting.  He denies any numbness or tingling.  He has not injured his shoulder or wrist before.  He feels otherwise well with no fevers, chills, fatigue other joint pain or myalgias.    Chief Complaint   Patient presents with     Work Related Injury     right shoulder and wrist x 9 days. Repetetive lifting of various sized totes - works at [employer].      ROS: See HPI    Current Outpatient Medications   Medication     cephALEXin (KEFLEX) 500 MG capsule     omeprazole (PRILOSEC OTC) 20 MG EC tablet     tretinoin (RETIN-A) 0.025 % external cream     amoxicillin (AMOXIL) 500 MG tablet     benzoyl peroxide 5 % external liquid     cetirizine (ZYRTEC) 10 MG tablet     cetirizine (ZYRTEC) 10 MG tablet     clindamycin (CLINDAMAX) 1 % external gel     ondansetron (ZOFRAN) 4 MG tablet     No current facility-administered medications for this visit.       Patient Active Problem List   Diagnosis     GERD (gastroesophageal reflux disease)     Abnormal laboratory test     Environmental allergies     Hyperlipidemia LDL goal <130     Anemia     Vitamin D deficiency        Past Medical History:   Diagnosis Date     Abnormal laboratory test 1/1/2012     Allergic state      Anemia 8/6/2013     Environmental allergies 1/6/2013     Vitamin D deficiency 1/24/14    vitamin D level 23     No past surgical history on file.  Family History   Problem Relation Age of Onset     Family History Negative No family hx of      Social History     Tobacco Use     Smoking status: Never Smoker      Smokeless tobacco: Never Used   Substance Use Topics     Alcohol use: No        OBJECTIVE:  /67 (BP Location: Left arm, Cuff Size: Adult Regular)   Pulse 69   Temp 98.6  F (37  C) (Temporal)   SpO2 97%      GENERAL APPEARACE: healthy, alert and no distress     EYES: EOMI     HENT: NCAT     NECK: Supple, non tender     MS: RUE: No visible dislocation or deformity of right shoulder.  Tenderness at the belly of the supraspinatus into the insertion point on the humerus.  Full range of motion.  Positive empty can and Raman test.  Strength 5/5, neurovascularly intact.  No visible dislocation or deformity of right wrist.  Diffuse tenderness over extensor and flexor tendons.  Pain with flexion and extension against resistance.  Full range of motion, full range of motion in fingers.  No elbow tenderness     SKIN: no suspicious lesions or rashes     NEURO: Normal strength and tone, sensory exam grossly normal, mentation intact and speech normal     PSYCH: mentation appears normal. and affect normal/bright    ASSESSMENT/PLAN:  Jackie was seen today for work related injury.    Diagnoses and all orders for this visit:    Sprain of right wrist, initial encounter  -     Wrist/Arm/Hand Supplies Order for DME - ONLY FOR DME    Supraspinatus tendonitis, right         Conservative management for likely overuse injury of wrist sprain and RC tendonitis. RICE x 2 weeks. Recommend wrist brace - prescription given today as we were out of in office braces.   Work restrictions filled out on Work comp papers: no heavy lifting or upper extremity exertion of R side for 1-2 weeks.     The plan of care was discussed with the patient. They understand and agree with the course of treatment prescribed. A printed summary was given including instructions and medications.     Anastacio Ravi PA-C

## 2020-08-12 NOTE — PATIENT INSTRUCTIONS
Ibuprofen 600 mg (3 of the 200 mg OTC tablets or 600 mg of the children's liquid) up to 4 times daily with food or milk  Tylenol 1000 mg every 8 hours as needed    Patient Education     Understanding a Wrist Sprain    A ligament is a strong band of tissue that connects bone to bone. The wrist has many ligaments. If any of these get stretched or torn, this is called a wrist sprain. A wrist sprain can be painful. It can also limit movement and use of the wrist and hand. Depending on the severity of the sprain, it may take a few weeks or longer for the wrist to heal.  Causes of a wrist sprain  Wrist sprains are most often caused by falling and landing on an outstretched hand. Anyone can get a wrist sprain. But the injury may be more likely in people who are very active or play sports.  Symptoms of a wrist sprain  At the time of injury, you may feel a popping or tearing in the wrist. You may have pain, redness, and swelling. You may also have some bruising. In some cases, symptoms may spread from the wrist to the hand. Until the wrist has healed, it may be hard to move or use the wrist and hand for normal tasks and activities, especially gripping and lifting.  Treating a wrist sprain  Treatment for wrist sprains may include any of the following:     Prescription or over-the-counter medicines. These help reduce pain and swelling.    A splint, brace, or cast. This is worn to support the wrist and keep the wrist from moving. You may need it for several weeks or longer, until the wrist heals.    Physical therapy and exercises. These help improve strength, flexibility, and range of motion in the wrist and hand.    Surgery. You may need surgery if the sprain is severe. For example, if the ligament is torn or if nearby tissues and bone are also injured. After surgery, you will often need to wear a splint or cast for a month or longer, until the wrist has healed.  Self-care measures  You can do several things to help relieve  pain and swelling. These may include:    Limiting how much you move and use your wrist and hand    Applying an ice pack to the injured area    Keeping your wrist and hand raised (elevated) above heart level    Wrapping your wrist in an elastic bandage  Possible complications  If the injury doesn t heal properly, it has a higher chance of happening again. The injury can also become long-term (chronic). This can cause ongoing pain, weakness, or instability of the wrist. Over time, arthritis may develop in the wrist. This can worsen pain and cause stiffness and limited movement of the wrist and hand.        When to call your healthcare provider  Call your healthcare provider right away if you have any of these:    Fever of 100.4 F (38 C) or higher, or as directed    Symptoms that don t get better with treatment, or get worse    Hand or fingers that feel numb or very cold, turn blue or gray, or swell    Symptoms such as redness, warmth, swelling, bleeding, or drainage that occur at the incision sites. This only applies if you had surgery.    New symptoms   Date Last Reviewed: 3/10/2016    8986-7732 [employer]. [address] [place] [address]. All rights reserved. This information is not intended as a substitute for professional medical care. Always follow your healthcare professional's instructions.           Patient Education     Shoulder Sprain  A sprain is a stretching or tearing of the ligaments that hold a joint together. A sprain may take up to 8 weeks to fully heal, depending on how severe it is. Moderate to severe shoulder sprains are treated with a sling or shoulder immobilizer. Minor sprains can be treated without any special support.  Home care  The following guidelines will help you care for your injury at home:    If a sling was given to you, leave it in place for the time advised by your healthcare provider. If you aren t sure how long to wear it, ask for advice. If the sling  becomes loose, adjust it so that your forearm is level with the ground. Your shoulder should feel well supported.    Put an ice pack on the injured area for 20 minutes every 1 to 2 hours the first day. You can make your own ice pack by putting ice cubes in a plastic bag. A bag of frozen peas or something similar works well too. Wrap the bag in a thin towel. Continue with ice packs 3 to 4 times a day for the next 2 to 3 days. Then use the pack as needed to ease pain and swelling.    You may use acetaminophen or ibuprofen to control pain, unless another pain medicine was prescribed. If you have chronic liver or kidney disease, talk with your healthcare provider before using these medicines. Also talk with your provider if you ve had a stomach ulcer or gastrointestinal bleeding.    Shoulder joints become stiff if left in a sling for too long. You should start range of motion exercises about 7 to 10 days after the injury. Talk with your provider to find out what type of exercises to do and how soon to start.  Follow-up care  Follow up with your healthcare provider, or as advised.  Any X-rays you had today don t show any broken bones, breaks, or fractures. Sometimes fractures don t show up on the first X-ray. Bruises and sprains can sometimes hurt as much as a fracture. These injuries can take time to heal completely. If your symptoms don t improve or they get worse, talk with your provider. You may need a repeat X-ray or other treatments.  When to seek medical advice  Call your healthcare provider right away if any of these occur:    Shoulder pain or swelling in your arm that gets worse    Fingers become cold, blue, numb, or tingly    Large amount of bruising of the shoulder or upper arm    Fever or chills  Date Last Reviewed: 8/1/2016 2000-2019 [employer]. [address] [place] [address], [place] [address]. All rights reserved. This information is not intended as a substitute for professional medical care.  Always follow your healthcare professional's instructions.

## 2020-09-02 ENCOUNTER — OFFICE VISIT (OUTPATIENT)
Dept: URGENT CARE | Facility: URGENT CARE | Age: 25
End: 2020-09-02
Payer: OTHER MISCELLANEOUS

## 2020-09-02 VITALS
WEIGHT: 174 LBS | OXYGEN SATURATION: 100 % | HEART RATE: 96 BPM | SYSTOLIC BLOOD PRESSURE: 115 MMHG | TEMPERATURE: 98.5 F | BODY MASS INDEX: 24.97 KG/M2 | DIASTOLIC BLOOD PRESSURE: 79 MMHG

## 2020-09-02 DIAGNOSIS — S63.501D SPRAIN OF RIGHT WRIST, SUBSEQUENT ENCOUNTER: Primary | ICD-10-CM

## 2020-09-02 PROCEDURE — 99213 OFFICE O/P EST LOW 20 MIN: CPT | Performed by: PHYSICIAN ASSISTANT

## 2020-09-02 ASSESSMENT — ENCOUNTER SYMPTOMS
NEUROLOGICAL NEGATIVE: 1
CONSTITUTIONAL NEGATIVE: 1
GASTROINTESTINAL NEGATIVE: 1
RESPIRATORY NEGATIVE: 1
MUSCULOSKELETAL NEGATIVE: 1
EYES NEGATIVE: 1
PSYCHIATRIC NEGATIVE: 1
CARDIOVASCULAR NEGATIVE: 1

## 2020-09-02 NOTE — LETTER
September 2, 2020      To Whom It May Concern:      Jackie Medeiros was seen in our urgent care today, 09/02/20. He seems to have greatly improved since his last visits and he may return to work effective immediately.     Sincerely,        Chilo Mcneil PA-C

## 2020-09-03 NOTE — PROGRESS NOTES
SUBJECTIVE:   Jackie Medeiros is a 25 year old male presenting with a chief complaint of   Chief Complaint   Patient presents with     Urgent Care     Patient Request for Note/Letter     pt wants a letter to return to work       He is an established patient of [facility].    Patient visits today after a wrist sprain at work 2 weeks ago that has greatly improved. Patient states that he needs a return to work note and he only has 10% of the pain that he was having during his last visit. No new symptoms reported.     Review of Systems   Constitutional: Negative.    HENT: Negative.    Eyes: Negative.    Respiratory: Negative.    Cardiovascular: Negative.    Gastrointestinal: Negative.    Genitourinary: Negative.    Musculoskeletal: Negative.    Neurological: Negative.    Psychiatric/Behavioral: Negative.        Past Medical History:   Diagnosis Date     Abnormal laboratory test 1/1/2012     Allergic state      Anemia 8/6/2013     Environmental allergies 1/6/2013     Vitamin D deficiency 1/24/14    vitamin D level 23     Family History   Problem Relation Age of Onset     Family History Negative No family hx of      Current Outpatient Medications   Medication Sig Dispense Refill     amoxicillin (AMOXIL) 500 MG tablet Take one by mouth in the AM and one in the PM. (Patient not taking: Reported on 9/2/2020) 60 tablet 2     benzoyl peroxide 5 % external liquid Apply daily, can increase to 2-3 times a day if needed, If excessive dryness or peeling occurs reduce amount used. (Patient not taking: Reported on 9/2/2020) 226 g 0     cetirizine (ZYRTEC) 10 MG tablet Take 1 tablet (10 mg) by mouth daily (Patient not taking: Reported on 9/2/2020) 30 tablet 0     cetirizine (ZYRTEC) 10 MG tablet Take 10 mg by mouth daily       clindamycin (CLINDAMAX) 1 % external gel Apply to face in the am. (Patient not taking: Reported on 9/2/2020) 60 g 3     omeprazole (PRILOSEC OTC) 20 MG EC tablet Take 1 tablet (20 mg) by mouth daily (Patient not  taking: Reported on 9/2/2020) 90 tablet 1     ondansetron (ZOFRAN) 4 MG tablet Take 1 tablet (4 mg) by mouth every 8 hours as needed for nausea (Patient not taking: Reported on 9/2/2020) 20 tablet 0     tretinoin (RETIN-A) 0.025 % external cream Apply topically At Bedtime (Patient not taking: Reported on 9/2/2020) 45 g 0     Social History     Tobacco Use     Smoking status: Never Smoker     Smokeless tobacco: Never Used   Substance Use Topics     Alcohol use: No       OBJECTIVE  /79   Pulse 96   Temp 98.5  F (36.9  C) (Tympanic)   Wt 78.9 kg (174 lb)   SpO2 100%   BMI 24.97 kg/m      Physical Exam  Constitutional:       General: He is not in acute distress.     Appearance: Normal appearance. He is normal weight. He is not ill-appearing, toxic-appearing or diaphoretic.   HENT:      Head: Normocephalic and atraumatic.   Cardiovascular:      Rate and Rhythm: Normal rate and regular rhythm.      Pulses: Normal pulses.      Heart sounds: No murmur. No friction rub. No gallop.    Pulmonary:      Effort: Pulmonary effort is normal.      Breath sounds: Normal breath sounds.   Musculoskeletal: Normal range of motion.         General: No swelling, tenderness, deformity or signs of injury.      Right lower leg: No edema.      Left lower leg: No edema.   Skin:     General: Skin is warm and dry.   Neurological:      General: No focal deficit present.      Mental Status: He is alert and oriented to person, place, and time. Mental status is at baseline.      Cranial Nerves: No cranial nerve deficit.      Sensory: No sensory deficit.      Motor: No weakness.      Coordination: Coordination normal.      Gait: Gait normal.      Deep Tendon Reflexes: Reflexes normal.   Psychiatric:         Mood and Affect: Mood normal.         Behavior: Behavior normal.         Thought Content: Thought content normal.         Judgment: Judgment normal.           ASSESSMENT/PLAN:    (B16.504J) Sprain of right wrist, subsequent encounter   (primary encounter diagnosis)  Plan: Work note given.     Okay to take acetaminophen 500 mg- 2 tabs (Total of 1000 mg) every 8 hrs   Okay to take ibuprofen 200 mg- 3 tabs (Total of 600 mg) every 6 hours      Follow up PRN    Chilo Mcneil PA-C on 9/2/2020 at 8:59 PM

## 2020-09-17 ENCOUNTER — OFFICE VISIT (OUTPATIENT)
Dept: URGENT CARE | Facility: URGENT CARE | Age: 25
End: 2020-09-17
Payer: COMMERCIAL

## 2020-09-17 ENCOUNTER — OFFICE VISIT (OUTPATIENT)
Dept: FAMILY MEDICINE | Facility: CLINIC | Age: 25
End: 2020-09-17
Payer: COMMERCIAL

## 2020-09-17 VITALS
SYSTOLIC BLOOD PRESSURE: 102 MMHG | HEART RATE: 59 BPM | DIASTOLIC BLOOD PRESSURE: 60 MMHG | TEMPERATURE: 98.3 F | OXYGEN SATURATION: 97 %

## 2020-09-17 VITALS — SYSTOLIC BLOOD PRESSURE: 112 MMHG | DIASTOLIC BLOOD PRESSURE: 72 MMHG

## 2020-09-17 DIAGNOSIS — K21.9 GASTROESOPHAGEAL REFLUX DISEASE WITHOUT ESOPHAGITIS: ICD-10-CM

## 2020-09-17 DIAGNOSIS — Z51.81 ENCOUNTER FOR THERAPEUTIC DRUG MONITORING: Primary | ICD-10-CM

## 2020-09-17 DIAGNOSIS — Z72.821 POOR SLEEP HYGIENE: ICD-10-CM

## 2020-09-17 DIAGNOSIS — L70.0 CYSTIC ACNE: ICD-10-CM

## 2020-09-17 DIAGNOSIS — L81.0 POST-INFLAMMATORY HYPERPIGMENTATION: ICD-10-CM

## 2020-09-17 DIAGNOSIS — G47.00 INSOMNIA, UNSPECIFIED TYPE: Primary | ICD-10-CM

## 2020-09-17 LAB
ERYTHROCYTE [DISTWIDTH] IN BLOOD BY AUTOMATED COUNT: 13.1 % (ref 10–15)
HCT VFR BLD AUTO: 42.9 % (ref 40–53)
HGB BLD-MCNC: 15 G/DL (ref 13.3–17.7)
MCH RBC QN AUTO: 28.5 PG (ref 26.5–33)
MCHC RBC AUTO-ENTMCNC: 35 G/DL (ref 31.5–36.5)
MCV RBC AUTO: 81 FL (ref 78–100)
PLATELET # BLD AUTO: 179 10E9/L (ref 150–450)
RBC # BLD AUTO: 5.27 10E12/L (ref 4.4–5.9)
WBC # BLD AUTO: 4.5 10E9/L (ref 4–11)

## 2020-09-17 PROCEDURE — 99214 OFFICE O/P EST MOD 30 MIN: CPT | Performed by: PHYSICIAN ASSISTANT

## 2020-09-17 PROCEDURE — 80061 LIPID PANEL: CPT | Performed by: PHYSICIAN ASSISTANT

## 2020-09-17 PROCEDURE — 80053 COMPREHEN METABOLIC PANEL: CPT | Performed by: PHYSICIAN ASSISTANT

## 2020-09-17 PROCEDURE — 36415 COLL VENOUS BLD VENIPUNCTURE: CPT | Performed by: PHYSICIAN ASSISTANT

## 2020-09-17 PROCEDURE — 85027 COMPLETE CBC AUTOMATED: CPT | Performed by: PHYSICIAN ASSISTANT

## 2020-09-17 RX ORDER — OMEPRAZOLE 20 MG/1
20 TABLET, DELAYED RELEASE ORAL DAILY
Qty: 90 TABLET | Refills: 1 | Status: SHIPPED | OUTPATIENT
Start: 2020-09-17 | End: 2020-12-04

## 2020-09-17 RX ORDER — ISOTRETINOIN 40 MG/1
40 CAPSULE ORAL DAILY
Qty: 30 CAPSULE | Refills: 0 | Status: SHIPPED | OUTPATIENT
Start: 2020-09-17 | End: 2020-10-19

## 2020-09-17 RX ORDER — ONDANSETRON 4 MG/1
4 TABLET, FILM COATED ORAL EVERY 8 HOURS PRN
Qty: 20 TABLET | Refills: 0 | Status: SHIPPED | OUTPATIENT
Start: 2020-09-17 | End: 2020-09-24

## 2020-09-17 NOTE — PROGRESS NOTES
HPI:  Jackie Medeiros is a 25 year old male patient here today for acne on face .  Patient states this has been present for since 2013.  Patient reports the following symptoms: breatkout .  Patient reports the following previous treatments: cephalexin, clindamycin, amoxicillin,  bpo and tretinoin with minimal improvement.  Patient reports the following modifying factors: none.  Associated symptoms: none.  Patient has no other skin complaints today.  Remainder of the HPI, Meds, PMH, Allergies, FH, and SH was reviewed in chart.      Past Medical History:   Diagnosis Date     Abnormal laboratory test 1/1/2012     Allergic state      Anemia 8/6/2013     Environmental allergies 1/6/2013     Vitamin D deficiency 1/24/14    vitamin D level 23       History reviewed. No pertinent surgical history.     Family History   Problem Relation Age of Onset     Family History Negative No family hx of        Social History     Socioeconomic History     Marital status: Single     Spouse name: Not on file     Number of children: Not on file     Years of education: Not on file     Highest education level: Not on file   Occupational History     Not on file   Social Needs     Financial resource strain: Not on file     Food insecurity     Worry: Not on file     Inability: Not on file     Transportation needs     Medical: Not on file     Non-medical: Not on file   Tobacco Use     Smoking status: Never Smoker     Smokeless tobacco: Never Used   Substance and Sexual Activity     Alcohol use: No     Drug use: No     Sexual activity: Yes     Partners: Female   Lifestyle     Physical activity     Days per week: Not on file     Minutes per session: Not on file     Stress: Not on file   Relationships     Social connections     Talks on phone: Not on file     Gets together: Not on file     Attends Oriental orthodox service: Not on file     Active member of club or organization: Not on file     Attends meetings of clubs or organizations: Not on file      Relationship status: Not on file     Intimate partner violence     Fear of current or ex partner: Not on file     Emotionally abused: Not on file     Physically abused: Not on file     Forced sexual activity: Not on file   Other Topics Concern     Parent/sibling w/ CABG, MI or angioplasty before 65F 55M? Not Asked   Social History Narrative    Brother:    7 yrs older, in [place]    11 yrs younger        Sisters    5 yrs older    12 yrs older        Came to [place] from [place] via [place].        He is 1 yr younger.                   Outpatient Encounter Medications as of 9/17/2020   Medication Sig Dispense Refill     amoxicillin (AMOXIL) 500 MG tablet Take one by mouth in the AM and one in the PM. 60 tablet 2     cetirizine (ZYRTEC) 10 MG tablet Take 10 mg by mouth daily       omeprazole (PRILOSEC OTC) 20 MG EC tablet Take 1 tablet (20 mg) by mouth daily 90 tablet 1     benzoyl peroxide 5 % external liquid Apply daily, can increase to 2-3 times a day if needed, If excessive dryness or peeling occurs reduce amount used. (Patient not taking: Reported on 9/2/2020) 226 g 0     cetirizine (ZYRTEC) 10 MG tablet Take 1 tablet (10 mg) by mouth daily (Patient not taking: Reported on 9/2/2020) 30 tablet 0     clindamycin (CLINDAMAX) 1 % external gel Apply to face in the am. (Patient not taking: Reported on 9/2/2020) 60 g 3     ondansetron (ZOFRAN) 4 MG tablet Take 1 tablet (4 mg) by mouth every 8 hours as needed for nausea (Patient not taking: Reported on 9/2/2020) 20 tablet 0     tretinoin (RETIN-A) 0.025 % external cream Apply topically At Bedtime (Patient not taking: Reported on 9/2/2020) 45 g 0     No facility-administered encounter medications on file as of 9/17/2020.        Review Of Systems:  Skin: acne  Eyes: negative  Ears/Nose/Throat: negative  Respiratory: No shortness of breath, dyspnea on exertion, cough, or hemoptysis  Cardiovascular: negative  Gastrointestinal: negative  Genitourinary:  negative  Musculoskeletal: negative  Neurologic: negative  Psychiatric: negative  Hematologic/Lymphatic/Immunologic: negative  Endocrine: negative      Objective:     There were no vitals taken for this visit.  Eyes: Conjunctivae/lids: Normal   ENT: Lips:  Normal  MSK: Normal  Cardiovascular: Peripheral edema none  Pulm: Breathing Normal  Neuro/Psych: Orientation: A/O x 3 Normal; Mood/Affect: Normal, NAD, WDWN  Pt accompanied by: self  Following areas examined: face, neck, ears  Junior skin type:v   Findings:  Cystic acne on cheeks  Light brown/pink smooth macules on cheeks  Assessment and Plan:  1) cystic acne, PIH, medication monitoring  Stop all other ance medication  Standing CBC, CMP, and fasting lipids (for females include urine pregnancy test)  Ipledge reviewed with patient and Ipledge consent form complete  Patient place in ipledge system yes  Prescription sent to pharmacy yes 9/17/20  Ipledge: [phone]  Total: 0  Target:  78.18kg  150mg/kg 04218  200mg/kg 85680  220mg/kg 08919  Return to clinic 30 days  No history of depression, anxiety,  or suicidal tendencies.  Isotretinoin (Accutane) education:  Do not share medication, do not donate blood, and do not get pregnant while on accutane.  While on Accutane: do not use other topical acne medications. Do not share medication. Do not get pregnant (including one month after stopping medication). Do not donate blood. Do not wax. Do not get laser, peels, or aggressive facials while on Accutane of for 6 months after.   Females must  their prescription within 7 days of having urine pregnancy test.  Dry lips and mouth, minor swelling of the eyelids or lips, crusty skin, nosebleeds, GI upset, or thinning of hair may occur. If any of these effects persist or worsen, tell your doctor or pharmacist promptly.   To relieve dry mouth, suck on (sugarless) hard candy or ice chips, chew (sugarless) gum, drink water.   Remember that your doctor has prescribed this  medication because he or she has judged that the benefit to you is greater than the risk of side effects. Many people using this medication do not have serious side effects.   Contact office immediately if you have any of these unlikely but serious side effects: mental/mood changes (e.g., depression,  aggressive or violent behavior, and in rare cases, thoughts of suicide), tingling feeling in the skin, quick/severe sun sensitivity, back/joint/muscle pain, signs of infection (e.g., fever, persistent sore throat, painful swallowing, peeling skin on palms/soles.   Isotretinoin may infrequently cause disease of the pancreatitis, that may rarely be fatal. Stop taking this medication and contact office immediately if you develop: severe stomach pain severe or persistent GI upset,   Stop taking this medication and tell your doctor immediately if you develop these unlikely but very serious side effects: severe headache, vision changes, ear ringing, hearling loss, chest pain, yellowing eyes, skin, dark urine, severe diarrhea, rectal bleeding,   Seek immediate medical attention if you notice any symptoms of a serious allergic reaction.  Wait 6 months after stopping accutane before getting piercing, tattoos, and/or laser treatment.    Accutane is discussed fully with the patient. It is a very effective drug to treat acne vulgaris but has many potential significant side effects. Chief among these are teratogensis, hepatic injury, dyslipidemia and severe drying of the mucous membranes. All of these issues have been discussed in details. Monthly blood tests to monitor lipids and liver functions will be necessary. Expect painful dryness and/or fissuring around the lips, eyes, and other moist areas of the body. Balms may be protective. Contact lens may be too painful to wear temporarily while on this drug. Episodes of significant depression have been reported, including suicidal ideation and attempts in rare cases. It may also  cause pseudotumor cerebri and hyperostosis. The patient will report any such changes in mood, depressive symptoms or suicidal thoughts, headaches, joint or bone pains. There is also a possible association with inflammatory bowel disease, although this is unproven at this point.       Follow up in 30 days

## 2020-09-17 NOTE — PATIENT INSTRUCTIONS
Proper skin care from [facility]:    -Eliminate harsh soaps as they strip the natural oils from the skin, often resulting in dry itchy skin ( i.e. Dial, Zest, Jyotsna Spring)  -Use mild soaps such as Cetaphil or Dove Sensitive Skin in the shower. You do not need to use soap on arms, legs, and trunk every time you shower unless visibly soiled.   -Avoid hot or cold showers.  -After showering, lightly dry off and apply moisturizing within 2-3 minutes. This will help trap moisture in the skin.   -Aggressive use of a moisturizer at least 1-2 times a day to the entire body (including -Vanicream, Cetaphil, Aquaphor or Cerave) and moisturize hands after every washing.  -We recommend using moisturizers that come in a tub that needs to be scooped out, not a pump. This has more of an oil base. It will hold moisture in your skin much better than a water base moisturizer. The above recommended are non-pore clogging.      Wear a sunscreen with at least SPF 30 on your face, ears, neck and V of the chest daily. Wear sunscreen on other areas of the body if those areas are exposed to the sun throughout the day. Sunscreens can contain physical and/or chemical blockers. Physical blockers are less likely to clog pores, these include zinc oxide and titanium dioxide. Reapply every two hour and after swimming. Sunscreen examples include Neutrogena, CeraVe, Blue Lizard, Elta MD and many others.    UV radiation  UVA radiation remains constant throughout the day and throughout the year. It is a longer wavelength than UVB and therefore penetrates deeper into the skin leading to immediate and delayed tanning, photoaging, and skin cancer. 70-80% of UVA and UVB radiation occurs between the hours of 10am-2pm.  UVB radiation  UVB radiation causes the most harmful effects and is more significant during the summer months. However, snow and ice can reflect UVB radiation leading to skin damage during the winter months as well. UVB radiation is  responsible for tanning, burning, inflammation, delayed erythema (pinkness), pigmentation (brown spots), and skin cancer.     I recommend self monthly full body exams and yearly full body exams with a dermatology provider. If you develop a new or changing lesion please follow up for examination. Most skin cancers are pink and scaly or pink and pearly. However, we do see blue/brown/black skin cancers.  Consider the ABCDEs of melanoma when giving yourself your monthly full body exam ( don't forget the groin, buttocks, feet, toes, etc). A-asymmetry, B-borders, C-color, D-diameter, E-elevation or evolving. If you see any of these changes please follow up in clinic. If you cannot see your back I recommend purchasing a hand held mirror to use with a larger wall mirror.          Accutane education:    Accutane is discussed fully with the patient. It is a very effective drug to treat acne vulgaris but has many significant side effects. Chief among these are teratogensis, hepatic injury, dyslipidemia and severe drying of the mucous membranes. All of these issues have been discussed in details. Monthly blood tests to monitor lipids and liver functions will be necessary. Expect painful dryness and/or fissuring around the lips, eyes, and other moist areas of the body. Balms may be protective. Contact lens may be too painful to wear temporarily while on this drug. Episodes of significant depression have been reported, including suicidal ideation and attempts in rare cases. It may also cause pseudotumor cerebri (idiopathic intracranial hypertension) and hyperostosis (excessive growth of bone). The patient will report any such changes in mood, depressive symptoms or suicidal thoughts, headaches, joint or bone pains.    Female patients MUST use two simultaneous methods of family planning. Accutane is Category X for pregnancy, meaning it will cause fetal teratogenic malformations, and pregnancy MUST be avoided while on this  drug.    The dose is 0.5-2 mg/kg in one to two divided doses for 15-20 weeks.    Wait 6 months after stopping accutane before getting piercing, tattoos, and/or laser treatment.    After discussion of these important issues, s/he indicates complete understanding of all of the above, and does wish to proceed with accutane therapy.

## 2020-09-17 NOTE — LETTER
9/17/2020         RE: Jackie Medeiros  [address] [place] [address]        Dear Colleague,    Thank you for referring your patient, Jackie Medeiros, to the [facility] [place]. Please see a copy of my visit note below.    HPI:  Jackie Medeiros is a 25 year old male patient here today for acne on face .  Patient states this has been present for since 2013.  Patient reports the following symptoms: breatkout .  Patient reports the following previous treatments: cephalexin, clindamycin, amoxicillin,  bpo and tretinoin with minimal improvement.  Patient reports the following modifying factors: none.  Associated symptoms: none.  Patient has no other skin complaints today.  Remainder of the HPI, Meds, PMH, Allergies, FH, and SH was reviewed in chart.      Past Medical History:   Diagnosis Date     Abnormal laboratory test 1/1/2012     Allergic state      Anemia 8/6/2013     Environmental allergies 1/6/2013     Vitamin D deficiency 1/24/14    vitamin D level 23       History reviewed. No pertinent surgical history.     Family History   Problem Relation Age of Onset     Family History Negative No family hx of        Social History     Socioeconomic History     Marital status: Single     Spouse name: Not on file     Number of children: Not on file     Years of education: Not on file     Highest education level: Not on file   Occupational History     Not on file   Social Needs     Financial resource strain: Not on file     Food insecurity     Worry: Not on file     Inability: Not on file     Transportation needs     Medical: Not on file     Non-medical: Not on file   Tobacco Use     Smoking status: Never Smoker     Smokeless tobacco: Never Used   Substance and Sexual Activity     Alcohol use: No     Drug use: No     Sexual activity: Yes     Partners: Female   Lifestyle     Physical activity     Days per week: Not on file     Minutes per session: Not on file     Stress: Not on file   Relationships      Social connections     Talks on phone: Not on file     Gets together: Not on file     Attends Gnosticist service: Not on file     Active member of club or organization: Not on file     Attends meetings of clubs or organizations: Not on file     Relationship status: Not on file     Intimate partner violence     Fear of current or ex partner: Not on file     Emotionally abused: Not on file     Physically abused: Not on file     Forced sexual activity: Not on file   Other Topics Concern     Parent/sibling w/ CABG, MI or angioplasty before 65F 55M? Not Asked   Social History Narrative    Brother:    7 yrs older, in [place]    11 yrs younger        Sisters    5 yrs older    12 yrs older        Came to [place] In [place] from [place] via [place].        He is 1 yr younger.                   Outpatient Encounter Medications as of 9/17/2020   Medication Sig Dispense Refill     amoxicillin (AMOXIL) 500 MG tablet Take one by mouth in the AM and one in the PM. 60 tablet 2     cetirizine (ZYRTEC) 10 MG tablet Take 10 mg by mouth daily       omeprazole (PRILOSEC OTC) 20 MG EC tablet Take 1 tablet (20 mg) by mouth daily 90 tablet 1     benzoyl peroxide 5 % external liquid Apply daily, can increase to 2-3 times a day if needed, If excessive dryness or peeling occurs reduce amount used. (Patient not taking: Reported on 9/2/2020) 226 g 0     cetirizine (ZYRTEC) 10 MG tablet Take 1 tablet (10 mg) by mouth daily (Patient not taking: Reported on 9/2/2020) 30 tablet 0     clindamycin (CLINDAMAX) 1 % external gel Apply to face in the am. (Patient not taking: Reported on 9/2/2020) 60 g 3     ondansetron (ZOFRAN) 4 MG tablet Take 1 tablet (4 mg) by mouth every 8 hours as needed for nausea (Patient not taking: Reported on 9/2/2020) 20 tablet 0     tretinoin (RETIN-A) 0.025 % external cream Apply topically At Bedtime (Patient not taking: Reported on 9/2/2020) 45 g 0     No facility-administered encounter medications on file as of 9/17/2020.         Review Of Systems:  Skin: acne  Eyes: negative  Ears/Nose/Throat: negative  Respiratory: No shortness of breath, dyspnea on exertion, cough, or hemoptysis  Cardiovascular: negative  Gastrointestinal: negative  Genitourinary: negative  Musculoskeletal: negative  Neurologic: negative  Psychiatric: negative  Hematologic/Lymphatic/Immunologic: negative  Endocrine: negative      Objective:     There were no vitals taken for this visit.  Eyes: Conjunctivae/lids: Normal   ENT: Lips:  Normal  MSK: Normal  Cardiovascular: Peripheral edema none  Pulm: Breathing Normal  Neuro/Psych: Orientation: A/O x 3 Normal; Mood/Affect: Normal, NAD, WDWN  Pt accompanied by: self  Following areas examined: face, neck, ears  Junior skin type:v   Findings:  Cystic acne on cheeks  Light brown/pink smooth macules on cheeks  Assessment and Plan:  1) cystic acne, PIH, medication monitoring  Stop all other ance medication  Standing CBC, CMP, and fasting lipids (for females include urine pregnancy test)  Ipledge reviewed with patient and Ipledge consent form complete  Patient place in ipledge system yes  Prescription sent to pharmacy yes 9/17/20  Ipledge: [id number]  Total: 0  Target:  78.18kg  150mg/kg 72356  200mg/kg 77750  220mg/kg 33183  Return to clinic 30 days  No history of depression, anxiety,  or suicidal tendencies.  Isotretinoin (Accutane) education:  Do not share medication, do not donate blood, and do not get pregnant while on accutane.  While on Accutane: do not use other topical acne medications. Do not share medication. Do not get pregnant (including one month after stopping medication). Do not donate blood. Do not wax. Do not get laser, peels, or aggressive facials while on Accutane of for 6 months after.   Females must  their prescription within 7 days of having urine pregnancy test.  Dry lips and mouth, minor swelling of the eyelids or lips, crusty skin, nosebleeds, GI upset, or thinning of hair may occur. If  any of these effects persist or worsen, tell your doctor or pharmacist promptly.   To relieve dry mouth, suck on (sugarless) hard candy or ice chips, chew (sugarless) gum, drink water.   Remember that your doctor has prescribed this medication because he or she has judged that the benefit to you is greater than the risk of side effects. Many people using this medication do not have serious side effects.   Contact office immediately if you have any of these unlikely but serious side effects: mental/mood changes (e.g., depression,  aggressive or violent behavior, and in rare cases, thoughts of suicide), tingling feeling in the skin, quick/severe sun sensitivity, back/joint/muscle pain, signs of infection (e.g., fever, persistent sore throat, painful swallowing, peeling skin on palms/soles.   Isotretinoin may infrequently cause disease of the pancreatitis, that may rarely be fatal. Stop taking this medication and contact office immediately if you develop: severe stomach pain severe or persistent GI upset,   Stop taking this medication and tell your doctor immediately if you develop these unlikely but very serious side effects: severe headache, vision changes, ear ringing, hearling loss, chest pain, yellowing eyes, skin, dark urine, severe diarrhea, rectal bleeding,   Seek immediate medical attention if you notice any symptoms of a serious allergic reaction.  Wait 6 months after stopping accutane before getting piercing, tattoos, and/or laser treatment.    Accutane is discussed fully with the patient. It is a very effective drug to treat acne vulgaris but has many potential significant side effects. Chief among these are teratogensis, hepatic injury, dyslipidemia and severe drying of the mucous membranes. All of these issues have been discussed in details. Monthly blood tests to monitor lipids and liver functions will be necessary. Expect painful dryness and/or fissuring around the lips, eyes, and other moist areas of  the body. Balms may be protective. Contact lens may be too painful to wear temporarily while on this drug. Episodes of significant depression have been reported, including suicidal ideation and attempts in rare cases. It may also cause pseudotumor cerebri and hyperostosis. The patient will report any such changes in mood, depressive symptoms or suicidal thoughts, headaches, joint or bone pains. There is also a possible association with inflammatory bowel disease, although this is unproven at this point.       Follow up in 30 days      Again, thank you for allowing me to participate in the care of your patient.        Sincerely,        Brittny Singh PA-C

## 2020-09-18 LAB
ALBUMIN SERPL-MCNC: 4.1 G/DL (ref 3.4–5)
ALP SERPL-CCNC: 104 U/L (ref 40–150)
ALT SERPL W P-5'-P-CCNC: 26 U/L (ref 0–70)
ANION GAP SERPL CALCULATED.3IONS-SCNC: 5 MMOL/L (ref 3–14)
AST SERPL W P-5'-P-CCNC: 16 U/L (ref 0–45)
BILIRUB SERPL-MCNC: 0.4 MG/DL (ref 0.2–1.3)
BUN SERPL-MCNC: 15 MG/DL (ref 7–30)
CALCIUM SERPL-MCNC: 8.9 MG/DL (ref 8.5–10.1)
CHLORIDE SERPL-SCNC: 106 MMOL/L (ref 94–109)
CHOLEST SERPL-MCNC: 170 MG/DL
CO2 SERPL-SCNC: 25 MMOL/L (ref 20–32)
CREAT SERPL-MCNC: 0.85 MG/DL (ref 0.66–1.25)
GFR SERPL CREATININE-BSD FRML MDRD: >90 ML/MIN/{1.73_M2}
GLUCOSE SERPL-MCNC: 84 MG/DL (ref 70–99)
HDLC SERPL-MCNC: 53 MG/DL
LDLC SERPL CALC-MCNC: 96 MG/DL
NONHDLC SERPL-MCNC: 117 MG/DL
POTASSIUM SERPL-SCNC: 4 MMOL/L (ref 3.4–5.3)
PROT SERPL-MCNC: 7.9 G/DL (ref 6.8–8.8)
SODIUM SERPL-SCNC: 136 MMOL/L (ref 133–144)
TRIGL SERPL-MCNC: 103 MG/DL

## 2020-09-18 NOTE — PROGRESS NOTES
SUBJECTIVE:  Jackie is a 25 year old male who presents to urgent care with problems sleeping.  He states that he is waking up a few times during the night and is having difficulty falling back asleep.  He has had issues like this usually once a year for several years.  He is not sure why it comes and goes.  He has been having this current episode for 2 weeks.  He states it is causing him to be tired during the day and give him headaches and he is not as focused at work.  He does not have a TV in his room.  He is on his phone in bed and usually before sleep.  He does not go to bed at the same time each night.  He does not wake up the same time in the morning.  He does not do much for exercise.  He has 1 cup of coffee in the morning and that is usually about it.  He denies any shortness of breath, nausea, vomiting, fever or chills and feels otherwise well.  He wants something to help him sleep.  He has tried melatonin but this did not work.  He states he been on Seroquel and something else for this in the past.    Chief Complaint   Patient presents with     Urgent Care     Sleep Problem     This episode started two weeks ago (patient state that he has this a couple of times a year for the last two years)- patient has not been able to sleep and waking up several times a night. Patient state that it is effecting his work and giving patient headaches during the day.      ROS: See HPI    Current Outpatient Medications   Medication     amoxicillin (AMOXIL) 500 MG tablet     benzoyl peroxide 5 % external liquid     cetirizine (ZYRTEC) 10 MG tablet     clindamycin (CLINDAMAX) 1 % external gel     ISOtretinoin (ACCUTANE) 40 MG capsule     omeprazole (PRILOSEC OTC) 20 MG EC tablet     ondansetron (ZOFRAN) 4 MG tablet     tretinoin (RETIN-A) 0.025 % external cream     No current facility-administered medications for this visit.       Patient Active Problem List   Diagnosis     GERD (gastroesophageal reflux disease)      Abnormal laboratory test     Environmental allergies     Hyperlipidemia LDL goal <130     Anemia     Vitamin D deficiency        Past Medical History:   Diagnosis Date     Abnormal laboratory test 1/1/2012     Allergic state      Anemia 8/6/2013     Environmental allergies 1/6/2013     Vitamin D deficiency 1/24/14    vitamin D level 23     No past surgical history on file.  Family History   Problem Relation Age of Onset     Family History Negative No family hx of      Social History     Tobacco Use     Smoking status: Never Smoker     Smokeless tobacco: Never Used   Substance Use Topics     Alcohol use: No        OBJECTIVE:  /60 (BP Location: Left arm, Patient Position: Sitting, Cuff Size: Adult Regular)   Pulse 59   Temp 98.3  F (36.8  C) (Oral)   SpO2 97%      GENERAL APPEARANCE: healthy, alert and no distress     EYES: EOMI     HENT: NCAT     MS: extremities normal- no gross deformities noted, no evidence of inflammation in joints, FROM in all extremities.     SKIN: no suspicious lesions or rashes     NEURO: Normal strength and tone, sensory exam grossly normal, mentation intact and speech normal     PSYCH: mentation appears normal. and affect normal/bright     ASSESSMENT/PLAN:  Jackie was seen today for urgent care and sleep problem.    Diagnoses and all orders for this visit:    Insomnia, unspecified type    Poor sleep hygiene    Gastroesophageal reflux disease without esophagitis  -     ondansetron (ZOFRAN) 4 MG tablet; Take 1 tablet (4 mg) by mouth every 8 hours as needed for nausea  -     omeprazole (PRILOSEC OTC) 20 MG EC tablet; Take 1 tablet (20 mg) by mouth daily    Patient's issues are likely secondary to poor sleep hygiene.  I discussed with him that we will not be giving him any prescription strength medication for sleep aid.  He can use Benadryl or I recommend melatonin.  Today I stressed the importance of sleep hygiene and that it takes a few days as the single best thing he can do for  successful and lasting sleep.  Instructions below.  I also refilled his omeprazole and Zofran for acid reflux  You need to improve your sleep hygiene:  The bedroom is only used for sleep and not sit.  Do not watch TV before bed.  No screens at least 30 minutes before falling asleep.  Do not drink coffee or any caffeinated beverages past noon.  Do not drink alcohol before falling asleep.  You need to go to bed at the same time each night and wake up in the morning at the same time.  Get more exercise throughout the day.  If you are not able to fall asleep within 1 to 2 hours get up and go to a different room and read do not go on your phone or screen.  Put the phone outside of your room or across your room see you cannot use it while you are laying in bed.  You can take melatonin to help with sleep.  If this is not improving your symptoms and follow-up with your primary care provider    The plan of care was discussed with the patient. They understand and agree with the course of treatment prescribed. A printed summary was given including instructions and medications.    Anastacio Ravi PA-C

## 2020-09-18 NOTE — PATIENT INSTRUCTIONS
You need to improve your sleep hygiene:  The bedroom is only used for sleep and not sit.  Do not watch TV before bed.  No screens at least 30 minutes before falling asleep.  Do not drink coffee or any caffeinated beverages past noon.  Do not drink alcohol before falling asleep.  You need to go to bed at the same time each night and wake up in the morning at the same time.  Get more exercise throughout the day.  If you are not able to fall asleep within 1 to 2 hours get up and go to a different room and read do not go on your phone or screen.  Put the phone outside of your room or across your room see you cannot use it while you are laying in bed.  You can take melatonin to help with sleep.  If this is not improving your symptoms and follow-up with your primary care provider  Patient Education     Treating Insomnia     Learning to relax before bedtime can improve your sleep.   Good sleeping habits are a key part of treatment. If needed, some medicines may help you sleep better at first. Making healthy lifestyle changes and learning to relax can improve your sleep. Treating insomnia takes commitment. But trust that your efforts will pay off. Be sure to talk with your healthcare provider before taking any medicine.  Healthy lifestyle  Your lifestyle affects your health and your sleep. Here are some healthy habits:    Keep a regular sleep schedule. Go to bed and get up at the same time each day.    Exercise regularly. It may help you reduce stress. Avoid strenuous exercise for 2 to 4 hours before bedtime.    Avoid or limit naps, especially in the late afternoon.    Use your bed only for sleep and sex.    Don t spend too much time in bed trying to fall asleep. If you can t fall asleep, get up and do something (no electronics) until you become tired and drowsy.    Avoid or limit caffeine and nicotine for up to 6 hours before bedtime. They can keep you awake at night.     Also avoid alcohol for at least 4 to 6 hours before  bedtime. It may help you fall asleep at first. But you will have more awakenings during the night. And your sleep will not be restful.  Before bedtime  To sleep better every night, try these tips:    Have a bedtime routine to let your body and mind know when it s time to sleep.    Think of going to bed as relaxing and enjoyable. Sleep will come sooner.    If your worries don t let you sleep, write them down in a diary. Then close it, and go to bed.    Make sure the room is not too hot or too cold. If it s not dark enough, an eye mask can help. If it s noisy, try using earplugs.    Don't eat a large meal just before bedtime.    Remove noises, bright lights, TVs, cell phones, and computers from your sleeping environment.    Use a comfortable mattress and pillow.  Learn to relax  Stress, anxiety, and body tension may keep you awake at night. To unwind before bedtime, try a warm bath, meditation, or yoga. Also try the following:    Deep breathing. Sit or lie back in a chair. Take a slow, deep breath. Hold it for 5 counts. Then breathe out slowly through your mouth. Keep doing this until you feel relaxed.    Progressive muscle relaxation. Tense and then relax the muscles in your body as you breathe deeply. Start with your feet and work up your body to your neck and face.  Cognitive Behavioral Therapy (CBT)  CBT is the most effective treatment for long-term insomnia. It tries to address the underlying causes of your sleep problems, including your habits and how you think about sleep.  Individual Therapy  Dejon Fry PsyD,   Insomnia   [facility]    [facility]: [phone]    [facility]: [phone]  [facility]  Visit www.[facility].org or call [phone] to find a clinic close to you.  Suggested resources  The resources below may help you relax. This list is for information only. [facility] is not responsible for the  quality of services or the actions of any person or organization. There may be a fee to use some of these resources.  Insomnia treatment books  Dee Mind by Christiana Rivera and Sandy Perez (2013)  Overcoming Insomnia by Joe Montes and Christiana Rivera (2008)  Quiet Your Mind and Get to Sleep: Solutions to Insomnia for Those with Depression, Anxiety or Chronic Pain by Christiana Rivera and Sandy Perez (2009)  No More Sleepless Nights by Davin Zambrano and Lila Roper (1996)  Say Dee to Insomnia by Tam Finn (2009)  The Insomnia Workbook by Martina Negrete and Noe Guillen (2009)  The Insomnia Answer by Russ Finch and Bonifacio Hughes (2006)  Stress management and relaxation books  The Relaxation and Stress Reduction Workbook by Tiffany Deleon, Darlene Dhaliwal and Anastacio Jaime (2008)  Stress Management Workbook: Techniques and Self-Assessment Procedures by Amanda Witt and Hans Jo (1997)  A Mindfulness-Based Stress Reduction Workbook by Larry Molina and Trudy Diane (2010)  The Complete Stress Management Workbook by Jerson Good, Miguel Rodarte and Manjit Belcher (1996)  Online programs  www.SHUTi.me (pronounced shut eye). There is a fee for this program.   www.sleepIO.com (pronounced sleep ee oh). There is a fee for this program.  Other online resources  Deep breathing and progressive muscle relaxation (PMR):    http://www."[employer] (T.O.H.L.)  Meditation:    www.fragrantheart.[employer] (T.O.H.L.)    www.theRam Powerguided-meditation-site.com  Guided imagery:    http://www.ZEFR    http://Bracketz.[employer] (T.O.H.L.)  (click on  Resource Library,  then choose  Meditation, Relaxation, Guided Imagery )  Apps for your mobile device:    Autogenic Training Progressive Muscle Relaxation by [employer]    Calm: Meditation and Sleep Stories by Calm.com, Inc.    Insight Timer - Meditation Ayse by [employer].  This is not a prescription and these resources are optional.  You must pay for any costs when using these resources. Please ask your insurance carrier if you can be reimbursed for these resources. If so, you are responsible for sending the needed details to your insurance carrier. These resources may also be tax deductible as medical expenses. Check with your .  Date Last Reviewed: 5/18/2018  Modifications clinically reviewed by Dejon Fry PsyD, LP, ALEXANDREA, Director of the Insomnia and Behavioral Sleep Health Program, [facility].    0365-7963 The [employer]. [address] [place] [address], [place] [address]. All rights reserved. This information is not intended as a substitute for professional medical care. Always follow your healthcare professional's instructions. This information has been modified by your health care provider with permission from the publisher.

## 2020-09-22 ENCOUNTER — TELEPHONE (OUTPATIENT)
Dept: FAMILY MEDICINE | Facility: CLINIC | Age: 25
End: 2020-09-22

## 2020-09-22 NOTE — TELEPHONE ENCOUNTER
Written by Brittny Singh PA-C on 9/18/2020 10:44 AM   Alli Mejia,     Labs look good to start Accutane.     Please let me know if you have questions.     Thank you,     Georgiana Singh PA-C

## 2020-09-22 NOTE — TELEPHONE ENCOUNTER
Sent via YouHelp by provider- sent letter to home address  Deborah DUARTE RN BSN  [facility]  [phone]

## 2020-09-22 NOTE — LETTER
September 22, 2020    Jackie Medeiros  [address] [place] [address]        Written by Brittny Singh PA-C on 9/18/2020 10:44 AM   Alli Mejia,     Labs look good to start Accutane.     Please let me know if you have questions.     Thank you,     Georgiana Singh PA-C     Office Visit on 09/17/2020   Component Date Value Ref Range Status     Sodium 09/17/2020 136  133 - 144 mmol/L Final     Potassium 09/17/2020 4.0  3.4 - 5.3 mmol/L Final     Chloride 09/17/2020 106  94 - 109 mmol/L Final     Carbon Dioxide 09/17/2020 25  20 - 32 mmol/L Final     Anion Gap 09/17/2020 5  3 - 14 mmol/L Final     Glucose 09/17/2020 84  70 - 99 mg/dL Final     Urea Nitrogen 09/17/2020 15  7 - 30 mg/dL Final     Creatinine 09/17/2020 0.85  0.66 - 1.25 mg/dL Final     GFR Estimate 09/17/2020 >90  >60 mL/min/[1.73_m2] Final     GFR Estimate If Black 09/17/2020 >90  >60 mL/min/[1.73_m2] Final     Calcium 09/17/2020 8.9  8.5 - 10.1 mg/dL Final     Bilirubin Total 09/17/2020 0.4  0.2 - 1.3 mg/dL Final     Albumin 09/17/2020 4.1  3.4 - 5.0 g/dL Final     Protein Total 09/17/2020 7.9  6.8 - 8.8 g/dL Final     Alkaline Phosphatase 09/17/2020 104  40 - 150 U/L Final     ALT 09/17/2020 26  0 - 70 U/L Final     AST 09/17/2020 16  0 - 45 U/L Final     WBC 09/17/2020 4.5  4.0 - 11.0 10e9/L Final     RBC Count 09/17/2020 5.27  4.4 - 5.9 10e12/L Final     Hemoglobin 09/17/2020 15.0  13.3 - 17.7 g/dL Final     Hematocrit 09/17/2020 42.9  40.0 - 53.0 % Final     MCV 09/17/2020 81  78 - 100 fl Final     MCH 09/17/2020 28.5  26.5 - 33.0 pg Final     MCHC 09/17/2020 35.0  31.5 - 36.5 g/dL Final     RDW 09/17/2020 13.1  10.0 - 15.0 % Final     Platelet Count 09/17/2020 179  150 - 450 10e9/L Final     Cholesterol 09/17/2020 170  <200 mg/dL Final     Triglycerides 09/17/2020 103  <150 mg/dL Final     HDL Cholesterol 09/17/2020 53  >39 mg/dL Final     LDL Cholesterol Calculated 09/17/2020 96  <100 mg/dL Final     Non HDL Cholesterol 09/17/2020  117  <130 mg/dL Final         Thank you for allowing me to be involved in your health care and for choosing [facility].  If you have any questions or concerns please feel free to contact me at [phone].

## 2020-09-29 ENCOUNTER — TELEPHONE (OUTPATIENT)
Dept: FAMILY MEDICINE | Facility: CLINIC | Age: 25
End: 2020-09-29

## 2020-09-29 ENCOUNTER — OFFICE VISIT (OUTPATIENT)
Dept: INTERNAL MEDICINE | Facility: CLINIC | Age: 25
End: 2020-09-29
Payer: COMMERCIAL

## 2020-09-29 VITALS
TEMPERATURE: 98.4 F | DIASTOLIC BLOOD PRESSURE: 70 MMHG | SYSTOLIC BLOOD PRESSURE: 108 MMHG | HEART RATE: 83 BPM | HEIGHT: 70 IN | WEIGHT: 175.3 LBS | OXYGEN SATURATION: 100 % | BODY MASS INDEX: 25.09 KG/M2

## 2020-09-29 DIAGNOSIS — F51.01 PRIMARY INSOMNIA: Primary | ICD-10-CM

## 2020-09-29 DIAGNOSIS — J30.2 SEASONAL ALLERGIC RHINITIS, UNSPECIFIED TRIGGER: ICD-10-CM

## 2020-09-29 DIAGNOSIS — S63.501D RIGHT WRIST SPRAIN, SUBSEQUENT ENCOUNTER: ICD-10-CM

## 2020-09-29 DIAGNOSIS — E55.9 VITAMIN D DEFICIENCY: ICD-10-CM

## 2020-09-29 DIAGNOSIS — H10.13 ALLERGIC CONJUNCTIVITIS, BILATERAL: ICD-10-CM

## 2020-09-29 PROCEDURE — 99213 OFFICE O/P EST LOW 20 MIN: CPT | Performed by: INTERNAL MEDICINE

## 2020-09-29 RX ORDER — OLOPATADINE HYDROCHLORIDE 1 MG/ML
1 SOLUTION/ DROPS OPHTHALMIC 2 TIMES DAILY
Qty: 5 ML | Refills: 2 | Status: SHIPPED | OUTPATIENT
Start: 2020-09-29 | End: 2022-09-14

## 2020-09-29 ASSESSMENT — MIFFLIN-ST. JEOR: SCORE: 1776.41

## 2020-09-29 NOTE — PATIENT INSTRUCTIONS
"  INSOMNIA:        Avoid caffeine within 6 hours of bed, avoid alcohol at night, avoid late meals and late exercise, avoid late physical activity.     Keep your sleep environment cool, dark and quiet with comfortable bedding, pillow, and mattress.      Try gentle background sounds with a sound machine, or small fan.  Try to keep a regular sleep-wake schedule.      For stimulus control, avoid being in bed for more than 20 minutes if unable to sleep.  If you do get out of bed, keep the lights dim, read a book that is not particularly entertaining, and return to sleep if you start feeling drowsy.     Practice deep breathing or other relaxation exercises.  Consider using an jayson to help with relaxation like \"Breethe: Sleep and Meditation\", \"Calm\" or \"Rodney: Meditation & Sleep\"    Do not use cell  phone, ipad, or TV or any device that emits \"blue light\". This causes stimulation of the brain.     Do not check email or perform any work related tasks within 30-60 minutes of bedtime, you need to avoid stimulation of the mind.      Trial of herbal sleep aids, these are safe, Follow the directions on the bottle.    --Melatonin, start 3 or 5 mg, increase upwards.    --YISEL 500-750 mg    Over the counter sleep aids Unison, Doxylamine, or diphenhydramine.  (These can possibly cause urinary troubles)    Most times, recurrent insomnia without an obvious cause is considered more of a symptom than a separate disease and many times can be a symptom of mood disorders such as depression and/or anxiety.      Read the following books for tips on manaing insomnia:  o  \"Say Good Night to Insomnia\" (by Zach Finn)  o \"The Sleep Solution:  Why Your Sleep is Broken, and How to Fix It\" (by Marco Do)    If none of these measures help relieve insomnia, then return to see me.     *  While the complete clinical significance of Vitamin D levels still remains to be determined, there is enough data to recommend supplementation whenever lower values " are seen as it has been shown to help bone health, immune system function, and treat seasonal affective disorder.     I would recommend taking Vitamin D3 5000 units per day, which you can get at any pharmacy and most grocery stores (the cheapest prices are at [employer] and [employer]).  We can repeat the levels the next time we do labs, there is no need to do it any earlier.       Insomnia caused by a Delayed Sleeping Pattern  Your responses indicate that your sleeping pattern may be 'delayed' or that you have difficulty getting to sleep as early as you would like. This type of problem can sometimes represent what is called a delayed circadian (body clock) rhythm. Each of us has a built in tendency to find it easier to stay up late at night or get up early in the morning. Being a 'night owl' or 'early bird' is something that we are usually born with and is a function of our internal body clock, called the circadian rhythm. When you are forced to keep a sleep schedule that goes against your typical habits (because of a job, school or other responsibilities) insomnia can be the result. Our bodies function best when we can keep the sleep pattern or habits that are most in tune with our internal body clock. If we keep different patterns or irregular sleep and wake up patterns, problems can develop.   Initial treatment may include the following:      Set a consistent wake up time every day of the week, including weekends at the time you need to be up for work.    Set the alarm and then move it from reach so you have to get up. Try two alarms if necessary.    Set your bedtime at 7-8 hours before you have to get up    30-minutes before bedtime begin doing only sedentary and relaxing things like reading or watching TV.     NO electronics (smart phone, computers) or work.    When you wake in the morning, get outside for about 30 minutes to get some sunlight    Take 1-5 mg of melatonin about 5 hours before the time you want to  go to bed      Try this schedule for 2-3 weeks without variation.  The first step before your appointment is to keep an accurate diary of your sleep schedule for two weeks before being seen.You can get a sleep diary form by clicking here or you can use a mobile phone sleep monitoring available on some smartphones.   Insomnia:  Insomnia is generally the inability to sleep when you want to be asleep. There are many types of insomnia, which can include difficulty falling to sleep, waking frequently during the night, difficulty getting back to sleep, waking too early in the morning, or a feeling of shallow, poor quality sleep. This type of pattern can develop for any number of reasons, such as a high stress situation, but most often does not have a specific cause. But the longer that the insomnia last the more likely that our body gets trained to have insomnia. What this means is that when you lie awake at times during the night, it is common for your mind to get overactive. If you lay there long enough, you get annoyed or frustrated with sleep, but may start to ruminate about many different topics and situations. When this happens over a long time, then your body gets accustomed or use to having this happen and hence it makes it more likely to happen. This is one of the major reasons people can feel drowsy while reading or watching TV in the evening but when you go to bed you can feel wide awake. Once sleep becomes a problem, people often 'try harder' to sleep, which most often just makes the problem worse. As you well know, you can't make yourself sleep.  Your insomnia may respond to relaxation exercises, changes in your bedtime routine, or methods to learn to let go of worry and planning. Some methods of treatment can be done on the computer. Often the decision of which method will work for you can be determined by meeting with a sleep specialist. Listed below are some relaxation strategies you can access over the  internet.   A common problem for people with insomnia is spending too much time in bed 'trying' to sleep. You really should only be in bed for no more than 7-8 hours per night and should go to bed when you are naturally sleepy. Spending too much time in bed can lead to being awake and having an 'overactive' mind. One effective way to address this problem is reducing how much time you spend in bed each night. Be careful with driving or other dangerous activities when trying these strategies however. We recommend that you follow these steps to improve your ability to get to sleep and stay asleep:     Set a new sleep schedule where you only spend as much time in bed as you actually sleep during the night. For example, if you spend 8 hours in bed but only sleep 6 hours per night, then only spend 6 hours in bed.    Keep this sleep schedule every day of the week including the weekends for about two weeks.    After two weeks you can add 30 minutes more time in bed if you have been getting to sleep more easily and sleeping more soundly.    If you still aren't sleeping well, reduce the time you spend in bed by 30 minutes but not less than 5 hours per night and keep this schedule for another week or two.    As you are trying these strategies, keep a sleep log to track how well you are keeping consistent sleep patterns.     CAUTION: When using this type of strategy it is important that you aren't doing anything dangerous and are able to drive without becoming drowsy.  When someone lays awake in bed over many nights, your body can actually learn to be awake in bed, mainly because that is what has happened so often. Your body has actually been 'conditioned' or trained to be awake during the night because it has happened over months or years. To break this habit you should try to follow these steps to improve your insomnia:     Set a strict bedtime and rise time with about 8 hours in bed, and keep this schedule every day of the  week, including weekends, for example, a bedtime of 10 PM and a wake up time of 6 AM.    Go to bed at the time you picked but only if you are sleepy (falling to sleep and not just tired or fatigued)    Don't lie in bed for more than 15-30 minutes if you can't sleep. Get up and go do something relaxing like reading until you become drowsy again and then go back to bed.    Get up at the time you picked every day regardless of how much sleep you get that night.    Use the bedroom only for sleep and sex. Do NOT watch TV, read, use the computer, play games on your cell phone or do work while in bed.    Do not take naps during the daytime and avoid any situations where you might get drowsy or fall asleep unintentionally especially in the evening.     The first step before your appointment is to keep an accurate diary of your sleep schedule for two weeks before being seen. You can get a sleep diary form by clicking here or you can use a mobile phone sleep monitoring available on some smartphones.  Relaxation strategies can help slow someone's mind down during the night. Click on the link below to try some relaxation exercises. Remember that relaxation is a skill to be developed so don't expect yourself to be good at these exercises immediately.   http://www.[facility].org/Services/SleepMedicine/Audio/index.htm   Good Sleep Habits     Your bedroom is for sleeping and should be quiet, comfortably cool and dark before you lie down.    You should not have electronic devices such as phones or computers in your bedroom.    Leave electronic equipment in another room on a  if possible as the light and engagement activate the brain and delay or disrupt sleep.    Your bedtime and awakening time should fit your natural tendency to fall asleep and wake up and should not vary much between weekdays and weekends.    No caffeine within 6 hours or alcohol within two hours of bedtime. A good rule is no caffeine after 3 PM.      Additional Information on Sleep  Why do we sleep?  Sleep science tells us that sleep is a biologic necessity that is required to:     Clear toxins from the brain    Remove unnecessary neural connections that accumulate during the day    Enhance memory and learning     Without adequate sleep, our brain may become impaired in a manner that is similar to being intoxicated.  How much sleep do we need?  The average adult needs 7-8 hours of sleep while young adolescents need up to 9 hours in order to function at their best. Teens have a delay in bedtime that is biology while older adults go to bed earlier.  Aim for 7-8 sleep and a regular schedule; it may take a week or more to eliminate the effects of chronic insufficient sleep.  How should I schedule when to go to sleep?  The timing of sleep is determined genetically for each of us. Some of us are night owls and go to bed late or larks and get up early. The timing of sleep and the amount of sleep we need changes with our age. Try to schedule your sleep time to fit your natural time for feeling sleepy and awakening without an alarm.  What if my sleep schedule does not fit my work schedule?  If you have no trouble falling asleep or getting up during the work week, your work schedule is probably well-matched to your natural sleep schedule. If your sleep is different when working than on vacation or days not working, you may need help from a sleep specialist to adjust your sleep schedule.             WRIST SPRAIN:     *  Wrist sprain, no evidence for fracture on xray.     *  No evidence for ankle arthritis, gout, fracture, or other cause for discomfort and swelling. This should slowly resolve over the next couple of weeks.      *  During this recovery  time, your ankle is more vulnerable to re-injury due to the fact that the ligaments are not able to provide as much support for the bones of the foot/ankle.      *  Ice the affected wrist as needed.     *  Return to activity  as tolerated.     * Motrin 400-600 mg three times per day as needed for relief of pain and swelling and inflammation.  Be sure to take with food to help reduce chance of stomach irritation   (DO NOT TAKE IBUPROFEN/MOTRIN/ADVIL IF YOU HAVE PRIOR TROUBLES WITH THESE MEDICATIONS OR HAVE BEEN TOLD NOT TO TAKE THEM)    *  Use a wrist brace to help improve stability of the wrist and keep the wrist in a neutral position to help take pressure off that nerve.              *  Wear the wrist brace specifically when you sleep, drive a car, or use the computer.  I would wear it all day long for the next week or two, then gradually wear it less as indicated by your symptoms.  I would continue to wear it while sleeping until your symptoms go completely away.    *  Range of motion exercises for wrist.  Trace the letter of the alphabet in the air.       *  If you continue to have problems, then we can have you see our hand physical therapists or even hand surgeon specialists if needed.            SEASONAL ALLERGIES:     *  Take one of the following over the counter non-sedating anti-histamines allergy tablet once per day, every day for the next 4-8 weeks during the duration of the allergy season.      --generic Allegra (fexofenidine)  OR  --generic Zyrtec (cetirizine)  OR  --generic Claritin (loratidine)         Benadryl (diphenhydramine) is a good anti-histmaine, but it causes drowsiness.  Insurances do not cover over the counter medications.      EXAMPLES OF OVER THE COUNTER NON-SEDATING ALLERGY MEDICATIONS:  (brand name and then generic versions)  The generic version are the exact same medication and work just as well, but just cost less.                  *  Patanol allergy eye drops, foloow directions on the bottle.      *  Use steroid nasal spray (available over the counter), Use 2 sprays into each nostril once per day, every day regardless of how you feel for the next 4-8 weeks, depending on the length of the allergy season.   "This type of steroid nasal spray will take at least 10 days to reach full effect, please use it for at least 3 full weeks before deciding if it doesn't work for you.       --typical brands include Flonase (fluticasone) or Nasonex (mometasone) or Nasocort (triamcinolone)    Examples of steroid nasal spray:  (cheapest prices are from [employer] or [employer]             * Beware of decongestants or medications preparations that have a \"D\", these contain pseudoephedrine or phenylephrine which may raise your blood pressure. If your blood pressure is well controlled and you are not on multiple medications, then you may be able to take small amounts of decongestant without a large chance of side effects, but please monitor your blood pressure and if >140/90 while on the decongestants, then stop those products.     *  If you cannot tolerate decongestants or have been told not to take decongestants, you can use chlortrimeton (chlorpheniramine) if you react poorly to decongestants.  Always try and use the lowest dose needed.  If you have a low of overnight or early morning allergy symptoms, then try taking you favorite nighttime multi symptom cold reliever medication (such as Nyquil, Vicks Formula 44, etc.)          "

## 2020-09-29 NOTE — TELEPHONE ENCOUNTER
Reason for Call:  Other     Detailed comments: Pt called to speak with Dr Leahy he thins he has allergy to med   ISOtretinoin (ACCUTANE), his face is dry.    Best Time: anytime    Can we leave a detailed message on this number? YES    Call taken on 9/29/2020 at 5:01 PM by Trina Strickland

## 2020-09-29 NOTE — PROGRESS NOTES
"Subjective     Jackie Medeiros is a 25 year old male who presents to clinic today for the following health issues:    HPI       Insomnia  Onset/Duration: x6-7 years  Description:   Frequency of insomnia:  nightly  Time to fall asleep (sleep latency): 2 hours  Middle of night awakening:  YES  Early morning awakening:  YES  Progression of Symptoms:  worsening and intermittent  Accompanying Signs & Symptoms:  Daytime sleepiness/napping: YES- sleepiness   Excessive snoring/apnea: no  Restless legs: YES  Waking to urinate: YES  Chronic pain:  no  Depression symptoms (if yes, do PHQ9): no  Anxiety symptoms (if yes, do TIMOTEO-7): no  History:  Prior Insomnia: YES  New stressful situation: no  Precipitating factors:   Caffeine intake: YES- tea  OTC decongestants: no  Any new medications: no  Alleviating factors:  Self medicating (alcohol, etc.):  YES- Accutane   Stress-reduction (exercise, yoga, meditation etc): no  Therapies tried and outcome: none    Typically uses phone late at night right up into the point of  trying to fall asleep.    Reports intermittent episodes of intense insomnia lasting for \"a few weeks \"at a time, usually in the later fall months.    Specifically denies depression and anxiety symptoms.    2.  Recent urgent care visit for right wrist sprain.  Was given recommendations and treatments.  His pain is nearly gone.  Still some soreness occasionally.  He has not been wearing a wrist brace on a regular basis.    3.  Reports mild nasal congestion and postnasal drip.  No fevers, no chills.    I reviewed the information recorded in the patient's EPIC chart (including but not limited to medical history, surgical history, problem list, medication list, and allergy list) and updated information as needed.         Review of Systems   Constitutional, HEENT, cardiovascular, pulmonary, gi and gu systems are negative, except as otherwise noted.      Objective    /70   Pulse 83   Temp 98.4  F (36.9  C) " "(Temporal)   Ht 1.778 m (5' 10\")   Wt 79.5 kg (175 lb 4.8 oz)   SpO2 100%   BMI 25.15 kg/m    Body mass index is 25.15 kg/m .  Physical Exam     GENERAL alert and no distress  EYES:  Normal sclera,conjunctiva, EOMI  HENT: facies symmetric  NECK:No thyroidmegaly.  MS: extremities- no gross deformities of the visible extremities noted,   WRIST: Patient is able to move the right wrist in all directions without any pain.  There is no obvious bony deformity or erythema present.  EXT:  no lower extremity edema  PSYCH: Alert and oriented times 3; speech- coherent  SKIN:  No obvious significant skin lesions on visible portions of face                 (F51.01) Primary insomnia  (primary encounter diagnosis)  Comment: Patient reports intermittent episodes of intense insomnia lasting 3 to 4 weeks.  Some any of these episodes happen in the fall to late fall.  Advised vitamin D 5000 units once daily October through March, possibly every day of the year.  Reviewed insomnia in detail, tickly the fact this could represent a symptom associated depression or anxiety.  He denies specific anxiety or depression symptoms.  Discussed insomnia and sleep issues at length with the pt.  Insomnia is most often a symptom of something else rather than just pure insomnia.    Discussed sleep hygeine, discussed the role of medications and expectations of using such medications.  The best way to deal with insomnia is to address any underlying issues.    We will have the patient try diphenhydramine which may also help with his allergies as well to.  Previously was given Seroquel many years ago for intermittent insomnia.  He does not want to take this at this time  Plan:     (O35.134M) Right wrist sprain, subsequent encounter  Comment: Recovered, recommended wrist brace as needed, particular when sleeping or driving until he is completely healed.  Plan:     (H10.13) Allergic conjunctivitis, bilateral  Comment: Reviewed allergic " conjunctivitis.  Plan: olopatadine (PATANOL) 0.1 % ophthalmic solution            (J30.2) Seasonal allergic rhinitis, unspecified trigger  Comment: Reviewed seasonal allergies/ environmental allergies/allergic and/or chronic rhintis with the pt. and available treatment options.  Pt understands that the insurance companies have lately desiring to see trial and failure of claritin OTC before covering prescription.  Also disucssed the role of steroid nasal sprays in these types of situations. Discussed the concept of avoidance measures and taking an active role in modifying whatever can be changed in preventing exposures to knwon allergens.  Also discussed the imprtance of reconsdiering ets if they are part of the problem. Also discussed the absolute need for smoking cessation if any tobacco abuse present.   Plan:     (E55.9) Vitamin D deficiency  Comment: Previous vitamin D level low.  Given reports of insomnia during the shortening of the daylight hours, recommend daily vitamin D supplementation.  Plan: Cholecalciferol (VITAMIN D-3) 125 MCG (5000 UT)        TABS

## 2020-09-30 NOTE — TELEPHONE ENCOUNTER
Called patient- VM full unable to LM.    Carine RN-BSN-N  [facility]  [phone]

## 2020-10-01 ENCOUNTER — MYC MEDICAL ADVICE (OUTPATIENT)
Dept: DERMATOLOGY | Facility: CLINIC | Age: 25
End: 2020-10-01

## 2020-10-01 NOTE — TELEPHONE ENCOUNTER
[employer] message sent:    Alli Mejia-    I tried calling you a few times, but your voicemail is full.    I had a message in regards to symptoms you were having from Accutane.     Please give me a call back at the number below.    Thanks,    Carine RN-BSN-N  [facility]  [phone]

## 2020-10-02 ENCOUNTER — TELEPHONE (OUTPATIENT)
Dept: FAMILY MEDICINE | Facility: CLINIC | Age: 25
End: 2020-10-02

## 2020-10-02 NOTE — TELEPHONE ENCOUNTER
Reason for Call:  Other call back    Detailed comments: Patient has a question regarding extremely dry lips. Patient may not answer the phone, you may leave a message.    Phone Number Patient can be reached at: Cell number on file:    Telephone Information:   Mobile [phone]       Best Time: Anytime    Can we leave a detailed message on this number? YES    Call taken on 10/2/2020 at 10:00 AM by Kobi Shea

## 2020-10-02 NOTE — TELEPHONE ENCOUNTER
Called and spoke to patient.    Patient taking 40mg of Accutane daily- reports having dry lips. Informed patient this is a common s/e while taking this medication.    Advised patient apply liberal amounts of Vaseline daily as needed.    Patient voiced understanding.    Carine RN-BSN-N  [facility]  [phone]

## 2020-10-02 NOTE — TELEPHONE ENCOUNTER
Called and spoke to patient.    Patient taking 40mg of Accutane daily- reports having dry lips. Informed patient this is a common s/e while taking this medication.    Advised patient apply liberal amounts of Vaseline daily as needed.    Patient voiced understanding.    Carine RN-BSN-N  [facility]  [phone]

## 2020-10-19 ENCOUNTER — MYC MEDICAL ADVICE (OUTPATIENT)
Dept: DERMATOLOGY | Facility: CLINIC | Age: 25
End: 2020-10-19

## 2020-10-19 ENCOUNTER — TELEPHONE (OUTPATIENT)
Dept: DERMATOLOGY | Facility: CLINIC | Age: 25
End: 2020-10-19

## 2020-10-19 ENCOUNTER — OFFICE VISIT (OUTPATIENT)
Dept: DERMATOLOGY | Facility: CLINIC | Age: 25
End: 2020-10-19
Payer: COMMERCIAL

## 2020-10-19 VITALS — SYSTOLIC BLOOD PRESSURE: 118 MMHG | HEART RATE: 66 BPM | DIASTOLIC BLOOD PRESSURE: 83 MMHG | OXYGEN SATURATION: 98 %

## 2020-10-19 DIAGNOSIS — Z51.81 ENCOUNTER FOR THERAPEUTIC DRUG MONITORING: ICD-10-CM

## 2020-10-19 DIAGNOSIS — L85.3 XEROSIS OF SKIN: ICD-10-CM

## 2020-10-19 DIAGNOSIS — L81.0 POST-INFLAMMATORY HYPERPIGMENTATION: ICD-10-CM

## 2020-10-19 DIAGNOSIS — L70.0 CYSTIC ACNE: Primary | ICD-10-CM

## 2020-10-19 LAB
ALBUMIN SERPL-MCNC: 4.1 G/DL (ref 3.4–5)
ALP SERPL-CCNC: 99 U/L (ref 40–150)
ALT SERPL W P-5'-P-CCNC: 30 U/L (ref 0–70)
ANION GAP SERPL CALCULATED.3IONS-SCNC: 3 MMOL/L (ref 3–14)
AST SERPL W P-5'-P-CCNC: 19 U/L (ref 0–45)
BILIRUB SERPL-MCNC: 0.5 MG/DL (ref 0.2–1.3)
BUN SERPL-MCNC: 16 MG/DL (ref 7–30)
CALCIUM SERPL-MCNC: 9.1 MG/DL (ref 8.5–10.1)
CHLORIDE SERPL-SCNC: 104 MMOL/L (ref 94–109)
CHOLEST SERPL-MCNC: 209 MG/DL
CO2 SERPL-SCNC: 28 MMOL/L (ref 20–32)
CREAT SERPL-MCNC: 0.78 MG/DL (ref 0.66–1.25)
ERYTHROCYTE [DISTWIDTH] IN BLOOD BY AUTOMATED COUNT: 13.5 % (ref 10–15)
GFR SERPL CREATININE-BSD FRML MDRD: >90 ML/MIN/{1.73_M2}
GLUCOSE SERPL-MCNC: 97 MG/DL (ref 70–99)
HCT VFR BLD AUTO: 43.1 % (ref 40–53)
HDLC SERPL-MCNC: 51 MG/DL
HGB BLD-MCNC: 14.4 G/DL (ref 13.3–17.7)
LDLC SERPL CALC-MCNC: 143 MG/DL
MCH RBC QN AUTO: 28 PG (ref 26.5–33)
MCHC RBC AUTO-ENTMCNC: 33.4 G/DL (ref 31.5–36.5)
MCV RBC AUTO: 84 FL (ref 78–100)
NONHDLC SERPL-MCNC: 158 MG/DL
PLATELET # BLD AUTO: 189 10E9/L (ref 150–450)
POTASSIUM SERPL-SCNC: 4.2 MMOL/L (ref 3.4–5.3)
PROT SERPL-MCNC: 8 G/DL (ref 6.8–8.8)
RBC # BLD AUTO: 5.15 10E12/L (ref 4.4–5.9)
SODIUM SERPL-SCNC: 135 MMOL/L (ref 133–144)
TRIGL SERPL-MCNC: 75 MG/DL
WBC # BLD AUTO: 4.8 10E9/L (ref 4–11)

## 2020-10-19 PROCEDURE — 80061 LIPID PANEL: CPT | Performed by: PHYSICIAN ASSISTANT

## 2020-10-19 PROCEDURE — 80053 COMPREHEN METABOLIC PANEL: CPT | Performed by: PHYSICIAN ASSISTANT

## 2020-10-19 PROCEDURE — 36415 COLL VENOUS BLD VENIPUNCTURE: CPT | Performed by: PHYSICIAN ASSISTANT

## 2020-10-19 PROCEDURE — 99213 OFFICE O/P EST LOW 20 MIN: CPT | Performed by: PHYSICIAN ASSISTANT

## 2020-10-19 PROCEDURE — 85027 COMPLETE CBC AUTOMATED: CPT | Performed by: PHYSICIAN ASSISTANT

## 2020-10-19 RX ORDER — ISOTRETINOIN 40 MG/1
40 CAPSULE ORAL 2 TIMES DAILY
Qty: 60 CAPSULE | Refills: 0 | Status: SHIPPED | OUTPATIENT
Start: 2020-10-19 | End: 2020-11-18

## 2020-10-19 NOTE — PATIENT INSTRUCTIONS
Accutane education:    Accutane is discussed fully with the patient. It is a very effective drug to treat acne vulgaris but has many significant side effects. Chief among these are teratogensis, hepatic injury, dyslipidemia and severe drying of the mucous membranes. All of these issues have been discussed in details. Monthly blood tests to monitor lipids and liver functions will be necessary. Expect painful dryness and/or fissuring around the lips, eyes, and other moist areas of the body. Balms may be protective. Contact lens may be too painful to wear temporarily while on this drug. Episodes of significant depression have been reported, including suicidal ideation and attempts in rare cases. It may also cause pseudotumor cerebri (idiopathic intracranial hypertension) and hyperostosis (excessive growth of bone). The patient will report any such changes in mood, depressive symptoms or suicidal thoughts, headaches, joint or bone pains.    Female patients MUST use two simultaneous methods of family planning. Accutane is Category X for pregnancy, meaning it will cause fetal teratogenic malformations, and pregnancy MUST be avoided while on this drug.    The dose is 0.5-2 mg/kg in one to two divided doses for 15-20 weeks.    Wait 6 months after stopping accutane before getting piercing, tattoos, and/or laser treatment.    After discussion of these important issues, s/he indicates complete understanding of all of the above, and does wish to proceed with accutane therapy.              Proper skin care from [facility]:    -Eliminate harsh soaps as they strip the natural oils from the skin, often resulting in dry itchy skin ( i.e. Dial, Zest, [place] Spring)  -Use mild soaps such as Cetaphil or Dove Sensitive Skin in the shower. You do not need to use soap on arms, legs, and trunk every time you shower unless visibly soiled.   -Avoid hot or cold showers.  -After showering, lightly dry off and apply moisturizing within  2-3 minutes. This will help trap moisture in the skin.   -Aggressive use of a moisturizer at least 1-2 times a day to the entire body (including -Vanicream, Cetaphil, Aquaphor or Cerave) and moisturize hands after every washing.  -We recommend using moisturizers that come in a tub that needs to be scooped out, not a pump. This has more of an oil base. It will hold moisture in your skin much better than a water base moisturizer. The above recommended are non-pore clogging.      Wear a sunscreen with at least SPF 30 on your face, ears, neck and V of the chest daily. Wear sunscreen on other areas of the body if those areas are exposed to the sun throughout the day. Sunscreens can contain physical and/or chemical blockers. Physical blockers are less likely to clog pores, these include zinc oxide and titanium dioxide. Reapply every two hour and after swimming. Sunscreen examples include Neutrogena, CeraVe, Blue Lizard, Elta MD and many others.    UV radiation  UVA radiation remains constant throughout the day and throughout the year. It is a longer wavelength than UVB and therefore penetrates deeper into the skin leading to immediate and delayed tanning, photoaging, and skin cancer. 70-80% of UVA and UVB radiation occurs between the hours of 10am-2pm.  UVB radiation  UVB radiation causes the most harmful effects and is more significant during the summer months. However, snow and ice can reflect UVB radiation leading to skin damage during the winter months as well. UVB radiation is responsible for tanning, burning, inflammation, delayed erythema (pinkness), pigmentation (brown spots), and skin cancer.     I recommend self monthly full body exams and yearly full body exams with a dermatology provider. If you develop a new or changing lesion please follow up for examination. Most skin cancers are pink and scaly or pink and pearly. However, we do see blue/brown/black skin cancers.  Consider the ABCDEs of melanoma when giving  yourself your monthly full body exam ( don't forget the groin, buttocks, feet, toes, etc). A-asymmetry, B-borders, C-color, D-diameter, E-elevation or evolving. If you see any of these changes please follow up in clinic. If you cannot see your back I recommend purchasing a hand held mirror to use with a larger wall mirror.

## 2020-10-19 NOTE — TELEPHONE ENCOUNTER
Brandie states to pt that provider needs to answer some questions for his insurance before he can get the medication . Provider please call pharmacy .Amanda Hussein RN

## 2020-10-19 NOTE — LETTER
10/19/2020         RE: Jackie Medeiros  [address] [place] [address]        Dear Colleague,    Thank you for referring your patient, Jackie Medeiros, to the [facility]. Please see a copy of my visit note below.    HPI:  Jackie Medeiros is a 25 year old male patient here today for acne on face . Pt has completed one month of accutane. Pt is already seeing improvement.   Patient states this has been present for since 2013.  Patient reports the following symptoms: breakout . Patient reports the following previous treatments: cephalexin, clindamycin, amoxicillin,  bpo and tretinoin with minimal improvement. Pt has noticed dryness on face. Denies mood changes.  Patient reports the following modifying factors: none.  Associated symptoms: none.  Patient has no other skin complaints today.  Remainder of the HPI, Meds, PMH, Allergies, FH, and SH was reviewed in chart.      Past Medical History:   Diagnosis Date     Abnormal laboratory test 1/1/2012     Allergic state      Anemia 8/6/2013     Environmental allergies 1/6/2013     Vitamin D deficiency 1/24/14    vitamin D level 23       No past surgical history on file.     Family History   Problem Relation Age of Onset     Family History Negative No family hx of        Social History     Socioeconomic History     Marital status: Single     Spouse name: Not on file     Number of children: Not on file     Years of education: Not on file     Highest education level: Not on file   Occupational History     Not on file   Social Needs     Financial resource strain: Not on file     Food insecurity     Worry: Not on file     Inability: Not on file     Transportation needs     Medical: Not on file     Non-medical: Not on file   Tobacco Use     Smoking status: Never Smoker     Smokeless tobacco: Never Used   Substance and Sexual Activity     Alcohol use: No     Drug use: No     Sexual activity: Yes     Partners: Female   Lifestyle      Physical activity     Days per week: Not on file     Minutes per session: Not on file     Stress: Not on file   Relationships     Social connections     Talks on phone: Not on file     Gets together: Not on file     Attends Quaker service: Not on file     Active member of club or organization: Not on file     Attends meetings of clubs or organizations: Not on file     Relationship status: Not on file     Intimate partner violence     Fear of current or ex partner: Not on file     Emotionally abused: Not on file     Physically abused: Not on file     Forced sexual activity: Not on file   Other Topics Concern     Parent/sibling w/ CABG, MI or angioplasty before 65F 55M? Not Asked   Social History Narrative    Brother:    7 yrs older, in [place]    11 yrs younger        Sisters    5 yrs older    12 yrs older        Came to [place] from [place] via [place]        He is 1 yr younger.                   Outpatient Encounter Medications as of 10/19/2020   Medication Sig Dispense Refill     cetirizine (ZYRTEC) 10 MG tablet Take 1 tablet (10 mg) by mouth daily 30 tablet 0     Cholecalciferol (VITAMIN D-3) 125 MCG (5000 UT) TABS Take 5,000 unit marking on an U-100 insulin syringe by mouth daily       ISOtretinoin (ACCUTANE) 40 MG capsule Take 1 capsule (40 mg) by mouth daily 30 capsule 0     olopatadine (PATANOL) 0.1 % ophthalmic solution Place 1 drop into both eyes 2 times daily USE TWICE PER DAY AS NEEDED DURING THE MAIN ALLERGY SEASON(S) 5 mL 2     omeprazole (PRILOSEC OTC) 20 MG EC tablet Take 1 tablet (20 mg) by mouth daily 90 tablet 1     amoxicillin (AMOXIL) 500 MG tablet Take one by mouth in the AM and one in the PM. (Patient not taking: Reported on 10/19/2020) 60 tablet 2     benzoyl peroxide 5 % external liquid Apply daily, can increase to 2-3 times a day if needed, If excessive dryness or peeling occurs reduce amount used. (Patient not taking: Reported on 9/2/2020) 226 g 0     clindamycin (CLINDAMAX) 1 %  external gel Apply to face in the am. (Patient not taking: Reported on 9/2/2020) 60 g 3     tretinoin (RETIN-A) 0.025 % external cream Apply topically At Bedtime (Patient not taking: Reported on 9/2/2020) 45 g 0     No facility-administered encounter medications on file as of 10/19/2020.        Review Of Systems:  Skin: acne  Eyes: negative  Ears/Nose/Throat: negative  Respiratory: No shortness of breath, dyspnea on exertion, cough, or hemoptysis  Cardiovascular: negative  Gastrointestinal: negative  Genitourinary: negative  Musculoskeletal: negative  Neurologic: negative  Psychiatric: negative  Hematologic/Lymphatic/Immunologic: negative  Endocrine: negative      Objective:     /83   Pulse 66   SpO2 98%   Eyes: Conjunctivae/lids: Normal   ENT: Lips:  Normal  MSK: Normal  Cardiovascular: Peripheral edema none  Pulm: Breathing Normal  Neuro/Psych: Orientation: A/O x 3 Normal; Mood/Affect: Normal, NAD, WDWN  Pt accompanied by: self  Following areas examined: face, neck, ears  Junior skin type:v   Findings:  Cystic acne on cheeks  Light brown/pink smooth macules on cheeks  Assessment and Plan:  1) cystic acne, PIH, medication monitoring, xerosis of skin  Continue moisturizing skin daily  Pt going to [facility] early 2021. Would like to be off of medication by then.   Stop all other ance medication  Standing CBC, CMP, and fasting lipids (for females include urine pregnancy test)  Ipledge reviewed with patient and Ipledge consent form complete  Patient place in ipledge system :yes 10/19/20  Prescription sent to pharmacy yes 10/19/20  Ipledge: [id number]  Total: 1200  Target:  78.18kg  150mg/kg 17801  200mg/kg 41166  220mg/kg 17942  Return to clinic 30 days  No history of depression, anxiety,  or suicidal tendencies.  Isotretinoin (Accutane) education:  Do not share medication, do not donate blood, and do not get pregnant while on accutane.  While on Accutane: do not use other topical acne medications. Do not  share medication. Do not get pregnant (including one month after stopping medication). Do not donate blood. Do not wax. Do not get laser, peels, or aggressive facials while on Accutane of for 6 months after.   Females must  their prescription within 7 days of having urine pregnancy test.  Dry lips and mouth, minor swelling of the eyelids or lips, crusty skin, nosebleeds, GI upset, or thinning of hair may occur. If any of these effects persist or worsen, tell your doctor or pharmacist promptly.   To relieve dry mouth, suck on (sugarless) hard candy or ice chips, chew (sugarless) gum, drink water.   Remember that your doctor has prescribed this medication because he or she has judged that the benefit to you is greater than the risk of side effects. Many people using this medication do not have serious side effects.   Contact office immediately if you have any of these unlikely but serious side effects: mental/mood changes (e.g., depression,  aggressive or violent behavior, and in rare cases, thoughts of suicide), tingling feeling in the skin, quick/severe sun sensitivity, back/joint/muscle pain, signs of infection (e.g., fever, persistent sore throat, painful swallowing, peeling skin on palms/soles.   Isotretinoin may infrequently cause disease of the pancreatitis, that may rarely be fatal. Stop taking this medication and contact office immediately if you develop: severe stomach pain severe or persistent GI upset,   Stop taking this medication and tell your doctor immediately if you develop these unlikely but very serious side effects: severe headache, vision changes, ear ringing, hearling loss, chest pain, yellowing eyes, skin, dark urine, severe diarrhea, rectal bleeding,   Seek immediate medical attention if you notice any symptoms of a serious allergic reaction.  Wait 6 months after stopping accutane before getting piercing, tattoos, and/or laser treatment.    Accutane is discussed fully with the patient.  It is a very effective drug to treat acne vulgaris but has many potential significant side effects. Chief among these are teratogensis, hepatic injury, dyslipidemia and severe drying of the mucous membranes. All of these issues have been discussed in details. Monthly blood tests to monitor lipids and liver functions will be necessary. Expect painful dryness and/or fissuring around the lips, eyes, and other moist areas of the body. Balms may be protective. Contact lens may be too painful to wear temporarily while on this drug. Episodes of significant depression have been reported, including suicidal ideation and attempts in rare cases. It may also cause pseudotumor cerebri and hyperostosis. The patient will report any such changes in mood, depressive symptoms or suicidal thoughts, headaches, joint or bone pains. There is also a possible association with inflammatory bowel disease, although this is unproven at this point.       Follow up in 30 days        Again, thank you for allowing me to participate in the care of your patient.        Sincerely,        Brittny Singh PA-C

## 2020-10-19 NOTE — PROGRESS NOTES
HPI:  Jackie Medeiros is a 25 year old male patient here today for acne on face . Pt has completed one month of accutane. Pt is already seeing improvement.   Patient states this has been present for since 2013.  Patient reports the following symptoms: breakout . Patient reports the following previous treatments: cephalexin, clindamycin, amoxicillin,  bpo and tretinoin with minimal improvement. Pt has noticed dryness on face. Denies mood changes.  Patient reports the following modifying factors: none.  Associated symptoms: none.  Patient has no other skin complaints today.  Remainder of the HPI, Meds, PMH, Allergies, FH, and SH was reviewed in chart.      Past Medical History:   Diagnosis Date     Abnormal laboratory test 1/1/2012     Allergic state      Anemia 8/6/2013     Environmental allergies 1/6/2013     Vitamin D deficiency 1/24/14    vitamin D level 23       No past surgical history on file.     Family History   Problem Relation Age of Onset     Family History Negative No family hx of        Social History     Socioeconomic History     Marital status: Single     Spouse name: Not on file     Number of children: Not on file     Years of education: Not on file     Highest education level: Not on file   Occupational History     Not on file   Social Needs     Financial resource strain: Not on file     Food insecurity     Worry: Not on file     Inability: Not on file     Transportation needs     Medical: Not on file     Non-medical: Not on file   Tobacco Use     Smoking status: Never Smoker     Smokeless tobacco: Never Used   Substance and Sexual Activity     Alcohol use: No     Drug use: No     Sexual activity: Yes     Partners: Female   Lifestyle     Physical activity     Days per week: Not on file     Minutes per session: Not on file     Stress: Not on file   Relationships     Social connections     Talks on phone: Not on file     Gets together: Not on file     Attends Faith service: Not on file     Active  member of club or organization: Not on file     Attends meetings of clubs or organizations: Not on file     Relationship status: Not on file     Intimate partner violence     Fear of current or ex partner: Not on file     Emotionally abused: Not on file     Physically abused: Not on file     Forced sexual activity: Not on file   Other Topics Concern     Parent/sibling w/ CABG, MI or angioplasty before 65F 55M? Not Asked   Social History Narrative    Brother:    7 yrs older, in [place]    11 yrs younger        Sisters    5 yrs older    12 yrs older        C[place] from [place]        He is 1 yr younger.                   Outpatient Encounter Medications as of 10/19/2020   Medication Sig Dispense Refill     cetirizine (ZYRTEC) 10 MG tablet Take 1 tablet (10 mg) by mouth daily 30 tablet 0     Cholecalciferol (VITAMIN D-3) 125 MCG (5000 UT) TABS Take 5,000 unit marking on an U-100 insulin syringe by mouth daily       ISOtretinoin (ACCUTANE) 40 MG capsule Take 1 capsule (40 mg) by mouth daily 30 capsule 0     olopatadine (PATANOL) 0.1 % ophthalmic solution Place 1 drop into both eyes 2 times daily USE TWICE PER DAY AS NEEDED DURING THE MAIN ALLERGY SEASON(S) 5 mL 2     omeprazole (PRILOSEC OTC) 20 MG EC tablet Take 1 tablet (20 mg) by mouth daily 90 tablet 1     amoxicillin (AMOXIL) 500 MG tablet Take one by mouth in the AM and one in the PM. (Patient not taking: Reported on 10/19/2020) 60 tablet 2     benzoyl peroxide 5 % external liquid Apply daily, can increase to 2-3 times a day if needed, If excessive dryness or peeling occurs reduce amount used. (Patient not taking: Reported on 9/2/2020) 226 g 0     clindamycin (CLINDAMAX) 1 % external gel Apply to face in the am. (Patient not taking: Reported on 9/2/2020) 60 g 3     tretinoin (RETIN-A) 0.025 % external cream Apply topically At Bedtime (Patient not taking: Reported on 9/2/2020) 45 g 0     No facility-administered encounter medications on file  as of 10/19/2020.        Review Of Systems:  Skin: acne  Eyes: negative  Ears/Nose/Throat: negative  Respiratory: No shortness of breath, dyspnea on exertion, cough, or hemoptysis  Cardiovascular: negative  Gastrointestinal: negative  Genitourinary: negative  Musculoskeletal: negative  Neurologic: negative  Psychiatric: negative  Hematologic/Lymphatic/Immunologic: negative  Endocrine: negative      Objective:     /83   Pulse 66   SpO2 98%   Eyes: Conjunctivae/lids: Normal   ENT: Lips:  Normal  MSK: Normal  Cardiovascular: Peripheral edema none  Pulm: Breathing Normal  Neuro/Psych: Orientation: A/O x 3 Normal; Mood/Affect: Normal, NAD, WDWN  Pt accompanied by: self  Following areas examined: face, neck, ears  Junior skin type:v   Findings:  Cystic acne on cheeks  Light brown/pink smooth macules on cheeks  Assessment and Plan:  1) cystic acne, PIH, medication monitoring, xerosis of skin  Continue moisturizing skin daily  Pt going to [facility] early 2021. Would like to be off of medication by then.   Stop all other ance medication  Standing CBC, CMP, and fasting lipids (for females include urine pregnancy test)  Ipledge reviewed with patient and Ipledge consent form complete  Patient place in ipledge system :yes 10/19/20  Prescription sent to pharmacy yes 10/19/20  Ipledge: [id number]  Total: 1200  Target:  78.18kg  150mg/kg 83142  200mg/kg 64607  220mg/kg 70367  Return to clinic 30 days  No history of depression, anxiety,  or suicidal tendencies.  Isotretinoin (Accutane) education:  Do not share medication, do not donate blood, and do not get pregnant while on accutane.  While on Accutane: do not use other topical acne medications. Do not share medication. Do not get pregnant (including one month after stopping medication). Do not donate blood. Do not wax. Do not get laser, peels, or aggressive facials while on Accutane of for 6 months after.   Females must  their prescription within 7 days of having  urine pregnancy test.  Dry lips and mouth, minor swelling of the eyelids or lips, crusty skin, nosebleeds, GI upset, or thinning of hair may occur. If any of these effects persist or worsen, tell your doctor or pharmacist promptly.   To relieve dry mouth, suck on (sugarless) hard candy or ice chips, chew (sugarless) gum, drink water.   Remember that your doctor has prescribed this medication because he or she has judged that the benefit to you is greater than the risk of side effects. Many people using this medication do not have serious side effects.   Contact office immediately if you have any of these unlikely but serious side effects: mental/mood changes (e.g., depression,  aggressive or violent behavior, and in rare cases, thoughts of suicide), tingling feeling in the skin, quick/severe sun sensitivity, back/joint/muscle pain, signs of infection (e.g., fever, persistent sore throat, painful swallowing, peeling skin on palms/soles.   Isotretinoin may infrequently cause disease of the pancreatitis, that may rarely be fatal. Stop taking this medication and contact office immediately if you develop: severe stomach pain severe or persistent GI upset,   Stop taking this medication and tell your doctor immediately if you develop these unlikely but very serious side effects: severe headache, vision changes, ear ringing, hearling loss, chest pain, yellowing eyes, skin, dark urine, severe diarrhea, rectal bleeding,   Seek immediate medical attention if you notice any symptoms of a serious allergic reaction.  Wait 6 months after stopping accutane before getting piercing, tattoos, and/or laser treatment.    Accutane is discussed fully with the patient. It is a very effective drug to treat acne vulgaris but has many potential significant side effects. Chief among these are teratogensis, hepatic injury, dyslipidemia and severe drying of the mucous membranes. All of these issues have been discussed in details. Monthly blood  tests to monitor lipids and liver functions will be necessary. Expect painful dryness and/or fissuring around the lips, eyes, and other moist areas of the body. Balms may be protective. Contact lens may be too painful to wear temporarily while on this drug. Episodes of significant depression have been reported, including suicidal ideation and attempts in rare cases. It may also cause pseudotumor cerebri and hyperostosis. The patient will report any such changes in mood, depressive symptoms or suicidal thoughts, headaches, joint or bone pains. There is also a possible association with inflammatory bowel disease, although this is unproven at this point.       Follow up in 30 days

## 2020-10-20 NOTE — TELEPHONE ENCOUNTER
Called and spoke to patient.    Informed patient he has been confirmed in iPledge by provider and medication has been sent to pharmacy.    Patient voiced understanding.    Carine RN-BSN-PHN  [facility]  [phone]

## 2020-10-20 NOTE — TELEPHONE ENCOUNTER
Jackie returned a call.  Derm triage was not available and Jackie is very frustrated.    He says he simply wants his medication sent to the pharmacy.

## 2020-10-21 ENCOUNTER — MYC MEDICAL ADVICE (OUTPATIENT)
Dept: INTERNAL MEDICINE | Facility: CLINIC | Age: 25
End: 2020-10-21

## 2020-10-21 ENCOUNTER — VIRTUAL VISIT (OUTPATIENT)
Dept: INTERNAL MEDICINE | Facility: CLINIC | Age: 25
End: 2020-10-21
Payer: COMMERCIAL

## 2020-10-21 DIAGNOSIS — A05.9 FOOD POISONING: Primary | ICD-10-CM

## 2020-10-21 DIAGNOSIS — R45.1 AGITATION: ICD-10-CM

## 2020-10-21 DIAGNOSIS — E55.9 VITAMIN D DEFICIENCY: ICD-10-CM

## 2020-10-21 DIAGNOSIS — F51.01 PRIMARY INSOMNIA: ICD-10-CM

## 2020-10-21 PROCEDURE — 99213 OFFICE O/P EST LOW 20 MIN: CPT | Mod: 95 | Performed by: INTERNAL MEDICINE

## 2020-10-21 NOTE — PATIENT INSTRUCTIONS
"*  Based on your symptoms, I suspect your recent episode of headaches and nausea and vomiting illness certainly due to food poisoning.    *  The symptoms should continue to improve and resolve as they have been doing.    *  There is no specific treatment for food poisoning, it completely resolves on its own.    *  If you develop any significant changing or worsening of your symptoms, let us know.    *  You may return to work without restrictions in 1-2 days if your symptoms resolve.       *  Based on your description of having \"extra energy like you are on caffeine all the time\" may be suggestive of hypomania which is a version of anxiety.    The quetiapine medicine you tried in the past sounds like it worked exactly as expected, you can expect to feel more \"sedated \"relative to how you have typically been feeling.  That is the intention of the medicine.    *  Trial of another medcaiton to help your insomina and agitation:     --Quetiapine (generic Seroquel) 12.5 mg ( 1/2 of 25 mg tablet)at bedtime as needed.  If you are still having tubles sleeping, then increase to 25 mg at bedtime.   Main side effects of Quetiapine (Seroquel), including risk of drowsiness, memory issues, nightmares, and sleep walking.  Do not to drink any alcohol or operate heavy machinery after taking this medication and to call if any side effects occur.     *  Return to see me in one month to further discuss your insomnia  "

## 2020-10-21 NOTE — PROGRESS NOTES
"Jackie Medeiros is a 25 year old male who is being evaluated via a billable video visit.      The patient has been notified of following:     \"This video visit will be conducted via a call between you and your physician/provider. We have found that certain health care needs can be provided without the need for an in-person physical exam.  This service lets us provide the care you need with a video conversation.  If a prescription is necessary we can send it directly to your pharmacy.  If lab work is needed we can place an order for that and you can then stop by our lab to have the test done at a later time.    Video visits are billed at different rates depending on your insurance coverage.  Please reach out to your insurance provider with any questions.    If during the course of the call the physician/provider feels a video visit is not appropriate, you will not be charged for this service.\"    Patient has given verbal consent for Video visit? Yes  How would you like to obtain your AVS? MyChart  If you are dropped from the video visit, the video invite should be resent to: Text to cell phone: [phone]  Will anyone else be joining your video visit? No    Subjective     Jackie Medeiros is a 25 year old male who presents today via video visit for the following health issues:    HPI     Chief Complaint   Patient presents with     Patient Request for Note/Letter     vomited twice yesterday with headache and is unable to return to work without note from doctor      Insomnia     follow up from 9/29 visit, insomnia has not improved despite OTC meds        1.  Headache starting yesterday, nausea starting yesterday afternoon. vomited x 2.  Mild headache was diffuse across scalp.   Tylenol helped.   No coughing, no shortness of breath, no dyspnea on exertion.   No diarrhea.   No sore throat.  No loss of taste or smell.   No fever.     Had suspicious meal of macaroni salad at lunch Monday, symptoms started few hours " "after that.  The macaroni salad tastes funny.       Symptoms better this am (approx 75%) with mild nausea, lesser headache today.            2.  Seen last month for discussion on insomnia.  Tried over-the-counter medications and improved sleep hygiene which did not improve sleeping.  He states he wakes up frequently.  Only able to sleep a few hours per night.  He mentioned he was seen by a provider [facility] in the past for the same issue and was given trazodone and quetiapine.  He states the trazodone did not help at all.    He was given quetiapine because he had described the sensation of feeling like he \"had a lot of energy all the time \", \" like I am taking caffeine all the time\".    He finds himself with lots of nervous energy and underlying restlessness, and feels like he always has to be doing something.  For example, he rearranged his bedroom furniture last night when he could not sleep.  I do not have records to the previous clinic visits in the other healthcare system.    Denies specific depression or anxiety symptoms.        Review of Systems   Constitutional, HEENT, cardiovascular, pulmonary, gi and gu systems are negative, except as otherwise noted.      Objective    Vitals - Patient Reported  Weight (Patient Reported): 81 kg (178 lb 9.6 oz)  Height (Patient Reported): 177.8 cm (5' 10\")  BMI (Based on Pt Reported Ht/Wt): 25.63  Temperature (Patient Reported): 97.7  F (36.5  C)      Vitals:  No vitals were obtained today due to virtual visit.    Physical Exam     GENERAL: Healthy, alert and no distress  EYES: Eyes grossly normal to inspection.  No discharge or erythema, or obvious scleral/conjunctival abnormalities.  RESP: No audible wheeze, cough, or visible cyanosis.  No visible retractions or increased work of breathing.    SKIN: Visible skin clear. No significant rash, abnormal pigmentation or lesions.  NEURO: Cranial nerves grossly intact.  Mentation and speech appropriate for age.  PSYCH: " "Mentation appears normal, affect normal/bright, judgement and insight intact, normal speech and appearance well-groomed.                (A05.9) Food poisoning  (primary encounter diagnosis)  Comment:   *  Based on your symptoms, I suspect your recent episode of headaches and nausea and vomiting illness certainly due to food poisoning.    *  The symptoms should continue to improve and resolve as they have been doing.    *  There is no specific treatment for food poisoning, it completely resolves on its own.    *  If you develop any significant changing or worsening of your symptoms, let us know.    *  You may return to work without restrictions in 1-2 days if your symptoms resolve.         Plan:     (E55.9) Vitamin D deficiency  Comment:   Plan: Cholecalciferol (VITAMIN D-3) 125 MCG (5000 UT)        TABS            (F51.01) Primary insomnia  Comment:   *  Based on your description of having \"extra energy like you are on caffeine all the time\" may be suggestive of hypomania which is a version of anxiety.    The quetiapine medicine you tried in the past sounds like it worked exactly as expected, you can expect to feel more \"sedated \"relative to how you have typically been feeling.  That is the intention of the medicine.    *  Trial of another medcaiton to help your insomina and agitation:     --Quetiapine (generic Seroquel) 12.5 mg ( 1/2 of 25 mg tablet)at bedtime as needed.  If you are still having tubles sleeping, then increase to 25 mg at bedtime.   Main side effects of Quetiapine (Seroquel), including risk of drowsiness, memory issues, nightmares, and sleep walking.  Do not to drink any alcohol or operate heavy machinery after taking this medication and to call if any side effects occur.     *  Return to see me in one month to further discuss your insomnia    Plan:     (R45.1) Agitation  Comment: Based in the patient's descriptions, it sounds like he probably has some version of hypomania.  I would not call it that " right now.  May benefit from other mood stabilizing medicine such as valproic acid or gabapentin.    Plan:          Video-Visit Details    Type of service:  Video Visit      Video Start Time: 1:45  PM    Video End Time:2:03 PM    Originating Location (pt. Location): Home    Distant Location (provider location):  [facility]     Platform used for Video Visit: Gt

## 2020-10-21 NOTE — LETTER
October 21, 2020      Jackie Medeiros  [address] [place] [address]          To whom it may concern:    Jackie Medeiros was evaluated today at our clinic regarding acute onset of nausea vomiting and headache since yesterday afternoon, most likely representing food poisoning.  I have recommended that he not return to work until his symptoms have completely resolved, which should happen in the next day or so, at which point he may return to work without restrictions.  I do not suspect COVID-19 infection at this time.  If his symptoms do not improve completely as expected, he will contact our office.    If you have any questions or concerns, please call the clinic at the number listed above.       Sincerely,        Yves Moscoso M.D.  Dept. of Internal Medicine  [facility]       cc: [email or web address]

## 2020-10-21 NOTE — TELEPHONE ENCOUNTER
Patient notified- please see other telephone encounter.    TUTU Hawk-BSN-PHN  [facility]  [phone]

## 2020-10-22 RX ORDER — QUETIAPINE FUMARATE 25 MG/1
12.5-25 TABLET, FILM COATED ORAL
Qty: 30 TABLET | Refills: 2 | Status: SHIPPED | OUTPATIENT
Start: 2020-10-22 | End: 2020-12-04

## 2020-11-18 ENCOUNTER — OFFICE VISIT (OUTPATIENT)
Dept: DERMATOLOGY | Facility: CLINIC | Age: 25
End: 2020-11-18
Payer: COMMERCIAL

## 2020-11-18 ENCOUNTER — TELEPHONE (OUTPATIENT)
Dept: DERMATOLOGY | Facility: CLINIC | Age: 25
End: 2020-11-18

## 2020-11-18 ENCOUNTER — MYC MEDICAL ADVICE (OUTPATIENT)
Dept: DERMATOLOGY | Facility: CLINIC | Age: 25
End: 2020-11-18

## 2020-11-18 VITALS — SYSTOLIC BLOOD PRESSURE: 114 MMHG | DIASTOLIC BLOOD PRESSURE: 72 MMHG | HEART RATE: 68 BPM

## 2020-11-18 DIAGNOSIS — L81.0 POST-INFLAMMATORY HYPERPIGMENTATION: ICD-10-CM

## 2020-11-18 DIAGNOSIS — L30.9 DERMATITIS: Primary | ICD-10-CM

## 2020-11-18 DIAGNOSIS — L70.0 CYSTIC ACNE: ICD-10-CM

## 2020-11-18 DIAGNOSIS — Z51.81 ENCOUNTER FOR THERAPEUTIC DRUG MONITORING: ICD-10-CM

## 2020-11-18 DIAGNOSIS — L85.3 XEROSIS OF SKIN: ICD-10-CM

## 2020-11-18 LAB
ALBUMIN SERPL-MCNC: 3.9 G/DL (ref 3.4–5)
ALP SERPL-CCNC: 96 U/L (ref 40–150)
ALT SERPL W P-5'-P-CCNC: 31 U/L (ref 0–70)
ANION GAP SERPL CALCULATED.3IONS-SCNC: 3 MMOL/L (ref 3–14)
AST SERPL W P-5'-P-CCNC: 20 U/L (ref 0–45)
BASOPHILS # BLD AUTO: 0 10E9/L (ref 0–0.2)
BASOPHILS NFR BLD AUTO: 1 %
BILIRUB SERPL-MCNC: 0.3 MG/DL (ref 0.2–1.3)
BUN SERPL-MCNC: 11 MG/DL (ref 7–30)
CALCIUM SERPL-MCNC: 9.2 MG/DL (ref 8.5–10.1)
CHLORIDE SERPL-SCNC: 105 MMOL/L (ref 94–109)
CHOLEST SERPL-MCNC: 177 MG/DL
CO2 SERPL-SCNC: 29 MMOL/L (ref 20–32)
CREAT SERPL-MCNC: 0.72 MG/DL (ref 0.66–1.25)
DIFFERENTIAL METHOD BLD: ABNORMAL
EOSINOPHIL # BLD AUTO: 0.1 10E9/L (ref 0–0.7)
EOSINOPHIL NFR BLD AUTO: 2 %
ERYTHROCYTE [DISTWIDTH] IN BLOOD BY AUTOMATED COUNT: 13.3 % (ref 10–15)
GFR SERPL CREATININE-BSD FRML MDRD: >90 ML/MIN/{1.73_M2}
GLUCOSE SERPL-MCNC: 90 MG/DL (ref 70–99)
HCT VFR BLD AUTO: 42.1 % (ref 40–53)
HDLC SERPL-MCNC: 43 MG/DL
HGB BLD-MCNC: 14.3 G/DL (ref 13.3–17.7)
LDLC SERPL CALC-MCNC: 105 MG/DL
LYMPHOCYTES # BLD AUTO: 2.5 10E9/L (ref 0.8–5.3)
LYMPHOCYTES NFR BLD AUTO: 55 %
MCH RBC QN AUTO: 28.4 PG (ref 26.5–33)
MCHC RBC AUTO-ENTMCNC: 34 G/DL (ref 31.5–36.5)
MCV RBC AUTO: 84 FL (ref 78–100)
MONOCYTES # BLD AUTO: 0.3 10E9/L (ref 0–1.3)
MONOCYTES NFR BLD AUTO: 7 %
NEUTROPHILS # BLD AUTO: 1.5 10E9/L (ref 1.6–8.3)
NEUTROPHILS NFR BLD AUTO: 35 %
NONHDLC SERPL-MCNC: 134 MG/DL
PLATELET # BLD AUTO: 195 10E9/L (ref 150–450)
PLATELET # BLD EST: ABNORMAL 10*3/UL
POTASSIUM SERPL-SCNC: 4.1 MMOL/L (ref 3.4–5.3)
PROT SERPL-MCNC: 7.7 G/DL (ref 6.8–8.8)
RBC # BLD AUTO: 5.03 10E12/L (ref 4.4–5.9)
SODIUM SERPL-SCNC: 137 MMOL/L (ref 133–144)
TRIGL SERPL-MCNC: 147 MG/DL
WBC # BLD AUTO: 4.4 10E9/L (ref 4–11)

## 2020-11-18 PROCEDURE — 99213 OFFICE O/P EST LOW 20 MIN: CPT | Performed by: PHYSICIAN ASSISTANT

## 2020-11-18 PROCEDURE — 80061 LIPID PANEL: CPT | Performed by: PHYSICIAN ASSISTANT

## 2020-11-18 PROCEDURE — 85004 AUTOMATED DIFF WBC COUNT: CPT | Performed by: PHYSICIAN ASSISTANT

## 2020-11-18 PROCEDURE — 80053 COMPREHEN METABOLIC PANEL: CPT | Performed by: PHYSICIAN ASSISTANT

## 2020-11-18 PROCEDURE — 36415 COLL VENOUS BLD VENIPUNCTURE: CPT | Performed by: PHYSICIAN ASSISTANT

## 2020-11-18 RX ORDER — ISOTRETINOIN 40 MG/1
40 CAPSULE ORAL 2 TIMES DAILY
Qty: 60 CAPSULE | Refills: 0 | Status: SHIPPED | OUTPATIENT
Start: 2020-11-18 | End: 2022-09-14

## 2020-11-18 RX ORDER — TRIAMCINOLONE ACETONIDE 0.25 MG/G
CREAM TOPICAL
Qty: 30 G | Refills: 0 | Status: SHIPPED | OUTPATIENT
Start: 2020-11-18 | End: 2022-09-14

## 2020-11-18 NOTE — PATIENT INSTRUCTIONS
Accutane education:    Accutane is discussed fully with the patient. It is a very effective drug to treat acne vulgaris but has many significant side effects. Chief among these are teratogensis, hepatic injury, dyslipidemia and severe drying of the mucous membranes. All of these issues have been discussed in details. Monthly blood tests to monitor lipids and liver functions will be necessary. Expect painful dryness and/or fissuring around the lips, eyes, and other moist areas of the body. Balms may be protective. Contact lens may be too painful to wear temporarily while on this drug. Episodes of significant depression have been reported, including suicidal ideation and attempts in rare cases. It may also cause pseudotumor cerebri (idiopathic intracranial hypertension) and hyperostosis (excessive growth of bone). The patient will report any such changes in mood, depressive symptoms or suicidal thoughts, headaches, joint or bone pains.    Female patients MUST use two simultaneous methods of family planning. Accutane is Category X for pregnancy, meaning it will cause fetal teratogenic malformations, and pregnancy MUST be avoided while on this drug.    The dose is 0.5-2 mg/kg in one to two divided doses for 15-20 weeks.    Wait 6 months after stopping accutane before getting piercing, tattoos, and/or laser treatment.    After discussion of these important issues, s/he indicates complete understanding of all of the above, and does wish to proceed with accutane therapy.  Proper skin care from [facility]:    -Eliminate harsh soaps as they strip the natural oils from the skin, often resulting in dry itchy skin ( i.e. Dial, Zest, Setswana Spring)  -Use mild soaps such as Cetaphil or Dove Sensitive Skin in the shower. You do not need to use soap on arms, legs, and trunk every time you shower unless visibly soiled.   -Avoid hot or cold showers.  -After showering, lightly dry off and apply moisturizing within 2-3  minutes. This will help trap moisture in the skin.   -Aggressive use of a moisturizer at least 1-2 times a day to the entire body (including -Vanicream, Cetaphil, Aquaphor or Cerave) and moisturize hands after every washing.  -We recommend using moisturizers that come in a tub that needs to be scooped out, not a pump. This has more of an oil base. It will hold moisture in your skin much better than a water base moisturizer. The above recommended are non-pore clogging.      Wear a sunscreen with at least SPF 30 on your face, ears, neck and V of the chest daily. Wear sunscreen on other areas of the body if those areas are exposed to the sun throughout the day. Sunscreens can contain physical and/or chemical blockers. Physical blockers are less likely to clog pores, these include zinc oxide and titanium dioxide. Reapply every two hour and after swimming. Sunscreen examples include Neutrogena, CeraVe, Otoniel Lizard, Elta MD and many others.    UV radiation  UVA radiation remains constant throughout the day and throughout the year. It is a longer wavelength than UVB and therefore penetrates deeper into the skin leading to immediate and delayed tanning, photoaging, and skin cancer. 70-80% of UVA and UVB radiation occurs between the hours of 10am-2pm.  UVB radiation  UVB radiation causes the most harmful effects and is more significant during the summer months. However, snow and ice can reflect UVB radiation leading to skin damage during the winter months as well. UVB radiation is responsible for tanning, burning, inflammation, delayed erythema (pinkness), pigmentation (brown spots), and skin cancer.     I recommend self monthly full body exams and yearly full body exams with a dermatology provider. If you develop a new or changing lesion please follow up for examination. Most skin cancers are pink and scaly or pink and pearly. However, we do see blue/brown/black skin cancers.  Consider the ABCDEs of melanoma when giving  yourself your monthly full body exam ( don't forget the groin, buttocks, feet, toes, etc). A-asymmetry, B-borders, C-color, D-diameter, E-elevation or evolving. If you see any of these changes please follow up in clinic. If you cannot see your back I recommend purchasing a hand held mirror to use with a larger wall mirror.          Apply a thin layer of triamcinlone to affected area 2x a day for up to 2 weeks. Tapering with improvement. Do not use on face or body folds.  Discussed side effects of topical steroids including but not limited to atrophy (skin thinning), striae (stretch marks) telangiectasias, steroid acne, and others. Do not apply to normal skin. Do not apply to discolored skin that does not have rash present. Educated patient on post inflammatory hyperpigmentation.

## 2020-11-18 NOTE — LETTER
11/18/2020         RE: Jackie Medeiros  [address] [place] [address]        Dear Colleague,    Thank you for referring your patient, Jackie Medeiros, to the [facility]. Please see a copy of my visit note below.    HPI:  Jackie Medeiros is a 25 year old male patient here today for acne on face . Pt has completed two month of accutane. Pt is already seeing improvement.   Patient states this has been present for since 2013.  Patient reports the following symptoms: breakout . Patient reports the following previous treatments: cephalexin, clindamycin, amoxicillin,  bpo and tretinoin with minimal improvement. Pt has noticed dryness on face. Denies mood changes.  Patient reports the following modifying factors: none.  Associated symptoms: none.  Patient has no other skin complaints today.  Remainder of the HPI, Meds, PMH, Allergies, FH, and SH was reviewed in chart.      Past Medical History:   Diagnosis Date     Abnormal laboratory test 1/1/2012     Allergic state      Anemia 8/6/2013     Environmental allergies 1/6/2013     Vitamin D deficiency 1/24/14    vitamin D level 23       History reviewed. No pertinent surgical history.     Family History   Problem Relation Age of Onset     Family History Negative No family hx of        Social History     Socioeconomic History     Marital status: Single     Spouse name: Not on file     Number of children: Not on file     Years of education: Not on file     Highest education level: Not on file   Occupational History     Not on file   Social Needs     Financial resource strain: Not on file     Food insecurity     Worry: Not on file     Inability: Not on file     Transportation needs     Medical: Not on file     Non-medical: Not on file   Tobacco Use     Smoking status: Never Smoker     Smokeless tobacco: Never Used   Substance and Sexual Activity     Alcohol use: No     Drug use: No     Sexual activity: Yes     Partners: Female    Lifestyle     Physical activity     Days per week: Not on file     Minutes per session: Not on file     Stress: Not on file   Relationships     Social connections     Talks on phone: Not on file     Gets together: Not on file     Attends Religion service: Not on file     Active member of club or organization: Not on file     Attends meetings of clubs or organizations: Not on file     Relationship status: Not on file     Intimate partner violence     Fear of current or ex partner: Not on file     Emotionally abused: Not on file     Physically abused: Not on file     Forced sexual activity: Not on file   Other Topics Concern     Parent/sibling w/ CABG, MI or angioplasty before 65F 55M? Not Asked   Social History Narrative    Brother:    7 yrs older, in [place]    11 yrs younger        Sisters    5 yrs older    12 yrs older        Came to [place] In [place] from [place] via [place].        He is 1 yr younger.                   Outpatient Encounter Medications as of 11/18/2020   Medication Sig Dispense Refill     cetirizine (ZYRTEC) 10 MG tablet Take 1 tablet (10 mg) by mouth daily 30 tablet 0     Cholecalciferol (VITAMIN D-3) 125 MCG (5000 UT) TABS Take 5,000 Units by mouth daily       ISOtretinoin (ACCUTANE) 40 MG capsule Take 1 capsule (40 mg) by mouth 2 times daily 60 capsule 0     olopatadine (PATANOL) 0.1 % ophthalmic solution Place 1 drop into both eyes 2 times daily USE TWICE PER DAY AS NEEDED DURING THE MAIN ALLERGY SEASON(S) 5 mL 2     QUEtiapine (SEROQUEL) 25 MG tablet Take 0.5-1 tablets (12.5-25 mg) by mouth nightly as needed (for insomnia or for agitation) 30 tablet 2     amoxicillin (AMOXIL) 500 MG tablet Take one by mouth in the AM and one in the PM. (Patient not taking: Reported on 11/18/2020) 60 tablet 2     benzoyl peroxide 5 % external liquid Apply daily, can increase to 2-3 times a day if needed, If excessive dryness or peeling occurs reduce amount used. (Patient not taking: Reported on 11/18/2020) 226 g 0      clindamycin (CLINDAMAX) 1 % external gel Apply to face in the am. (Patient not taking: Reported on 9/2/2020) 60 g 3     omeprazole (PRILOSEC OTC) 20 MG EC tablet Take 1 tablet (20 mg) by mouth daily (Patient not taking: Reported on 11/18/2020) 90 tablet 1     tretinoin (RETIN-A) 0.025 % external cream Apply topically At Bedtime (Patient not taking: Reported on 9/2/2020) 45 g 0     No facility-administered encounter medications on file as of 11/18/2020.        Review Of Systems:  Skin: acne  Eyes: negative  Ears/Nose/Throat: negative  Respiratory: No shortness of breath, dyspnea on exertion, cough, or hemoptysis  Cardiovascular: negative  Gastrointestinal: negative  Genitourinary: negative  Musculoskeletal: negative  Neurologic: negative  Psychiatric: negative  Hematologic/Lymphatic/Immunologic: negative  Endocrine: negative      Objective:     /72   Pulse 68   Eyes: Conjunctivae/lids: Normal   ENT: Lips:  Normal  MSK: Normal  Cardiovascular: Peripheral edema none  Pulm: Breathing Normal  Neuro/Psych: Orientation: A/O x 3 Normal; Mood/Affect: Normal, NAD, WDWN  Pt accompanied by: self  Following areas examined: face, neck, ears  Junior skin type:v   Findings:  Brown scaly patches on forehead  Light brown/pink smooth macules on cheeks  Assessment and Plan:  1) cystic acne, PIH, medication monitoring, xerosis of skin, dermatitis   Continue moisturizing skin daily  Pt going to [facility] early 2021. Would like to be off of medication by then.   Stop all other ance medication  Apply a thin layer of triamcinlone to affected area 2x a day for up to 2 weeks. Tapering with improvement. Do not use on face or body folds.  Discussed side effects of topical steroids including but not limited to atrophy (skin thinning), striae (stretch marks) telangiectasias, steroid acne, and others. Do not apply to normal skin. Do not apply to discolored skin that does not have rash present. Educated patient on post inflammatory  hyperpigmentation.   Standing CBC, CMP, and fasting lipids (for females include urine pregnancy test)  Ipledge reviewed with patient and Ipledge consent form complete  Patient place in ipledge system :yes 11/18/20  Prescription sent to pharmacy yes 11/18//20  Ipledge: [id number]  Total: 3600  Target:  78.18kg  150mg/kg 96957  200mg/kg 57031  220mg/kg 17623  Return to clinic 30 days  No history of depression, anxiety,  or suicidal tendencies.  Isotretinoin (Accutane) education:  Do not share medication, do not donate blood, and do not get pregnant while on accutane.  While on Accutane: do not use other topical acne medications. Do not share medication. Do not get pregnant (including one month after stopping medication). Do not donate blood. Do not wax. Do not get laser, peels, or aggressive facials while on Accutane of for 6 months after.   Females must  their prescription within 7 days of having urine pregnancy test.  Dry lips and mouth, minor swelling of the eyelids or lips, crusty skin, nosebleeds, GI upset, or thinning of hair may occur. If any of these effects persist or worsen, tell your doctor or pharmacist promptly.   To relieve dry mouth, suck on (sugarless) hard candy or ice chips, chew (sugarless) gum, drink water.   Remember that your doctor has prescribed this medication because he or she has judged that the benefit to you is greater than the risk of side effects. Many people using this medication do not have serious side effects.   Contact office immediately if you have any of these unlikely but serious side effects: mental/mood changes (e.g., depression,  aggressive or violent behavior, and in rare cases, thoughts of suicide), tingling feeling in the skin, quick/severe sun sensitivity, back/joint/muscle pain, signs of infection (e.g., fever, persistent sore throat, painful swallowing, peeling skin on palms/soles.   Isotretinoin may infrequently cause disease of the pancreatitis, that may rarely  be fatal. Stop taking this medication and contact office immediately if you develop: severe stomach pain severe or persistent GI upset,   Stop taking this medication and tell your doctor immediately if you develop these unlikely but very serious side effects: severe headache, vision changes, ear ringing, hearling loss, chest pain, yellowing eyes, skin, dark urine, severe diarrhea, rectal bleeding,   Seek immediate medical attention if you notice any symptoms of a serious allergic reaction.  Wait 6 months after stopping accutane before getting piercing, tattoos, and/or laser treatment.    Accutane is discussed fully with the patient. It is a very effective drug to treat acne vulgaris but has many potential significant side effects. Chief among these are teratogensis, hepatic injury, dyslipidemia and severe drying of the mucous membranes. All of these issues have been discussed in details. Monthly blood tests to monitor lipids and liver functions will be necessary. Expect painful dryness and/or fissuring around the lips, eyes, and other moist areas of the body. Balms may be protective. Contact lens may be too painful to wear temporarily while on this drug. Episodes of significant depression have been reported, including suicidal ideation and attempts in rare cases. It may also cause pseudotumor cerebri and hyperostosis. The patient will report any such changes in mood, depressive symptoms or suicidal thoughts, headaches, joint or bone pains. There is also a possible association with inflammatory bowel disease, although this is unproven at this point.       Follow up in 30 days        Again, thank you for allowing me to participate in the care of your patient.        Sincerely,        Brittny Singh PA-C

## 2020-11-18 NOTE — TELEPHONE ENCOUNTER
Jaco Solarsi message sent:    Alli Stoner sent your prescription for Accutane earlier today- its showing your pharmacy received the order on our end.    Please let me know if you have any problems.    TUTU Hawk-BSN-PHN  [facility]  [phone]

## 2020-11-18 NOTE — TELEPHONE ENCOUNTER
The patient returned a call and stated that he has checked with his pharmacy multiple times and they are telling him that there is a delay in refilling his prescription for accutane. The patient is currently working and stated that he would like Nidia call him back whenever she is available and to leave him a detailed message on his phone since he probably will not be able to answer when she calls back.

## 2020-11-18 NOTE — TELEPHONE ENCOUNTER
Reason for Call:  Other call back    Detailed comments: The patient called and stated that he is trying to refill his prescription for accutane but is having issues with delays like last month. He is wondering how he can get this filled as soon as possible. He would like a call back to discuss this.     Phone Number Patient can be reached at: Cell number on file:    Telephone Information:   Mobile [phone]       Best Time: Any    Can we leave a detailed message on this number? YES    Call taken on 11/18/2020 at 10:25 AM by Raquel Vernon

## 2020-11-18 NOTE — PROGRESS NOTES
HPI:  Jackie Medeiros is a 25 year old male patient here today for acne on face . Pt has completed two month of accutane. Pt is already seeing improvement.   Patient states this has been present for since 2013.  Patient reports the following symptoms: breakout . Patient reports the following previous treatments: cephalexin, clindamycin, amoxicillin,  bpo and tretinoin with minimal improvement. Pt has noticed dryness on face. Denies mood changes.  Patient reports the following modifying factors: none.  Associated symptoms: none.  Patient has no other skin complaints today.  Remainder of the HPI, Meds, PMH, Allergies, FH, and SH was reviewed in chart.      Past Medical History:   Diagnosis Date     Abnormal laboratory test 1/1/2012     Allergic state      Anemia 8/6/2013     Environmental allergies 1/6/2013     Vitamin D deficiency 1/24/14    vitamin D level 23       History reviewed. No pertinent surgical history.     Family History   Problem Relation Age of Onset     Family History Negative No family hx of        Social History     Socioeconomic History     Marital status: Single     Spouse name: Not on file     Number of children: Not on file     Years of education: Not on file     Highest education level: Not on file   Occupational History     Not on file   Social Needs     Financial resource strain: Not on file     Food insecurity     Worry: Not on file     Inability: Not on file     Transportation needs     Medical: Not on file     Non-medical: Not on file   Tobacco Use     Smoking status: Never Smoker     Smokeless tobacco: Never Used   Substance and Sexual Activity     Alcohol use: No     Drug use: No     Sexual activity: Yes     Partners: Female   Lifestyle     Physical activity     Days per week: Not on file     Minutes per session: Not on file     Stress: Not on file   Relationships     Social connections     Talks on phone: Not on file     Gets together: Not on file     Attends Muslim service: Not on  file     Active member of club or organization: Not on file     Attends meetings of clubs or organizations: Not on file     Relationship status: Not on file     Intimate partner violence     Fear of current or ex partner: Not on file     Emotionally abused: Not on file     Physically abused: Not on file     Forced sexual activity: Not on file   Other Topics Concern     Parent/sibling w/ CABG, MI or angioplasty before 65F 55M? Not Asked   Social History Narrative    Brother:    7 yrs older, in [place]    11 yrs younger        Sisters    5 yrs older    12 yrs older        Came to [place] from [place] via [place]        He is 1 yr younger.                   Outpatient Encounter Medications as of 11/18/2020   Medication Sig Dispense Refill     cetirizine (ZYRTEC) 10 MG tablet Take 1 tablet (10 mg) by mouth daily 30 tablet 0     Cholecalciferol (VITAMIN D-3) 125 MCG (5000 UT) TABS Take 5,000 Units by mouth daily       ISOtretinoin (ACCUTANE) 40 MG capsule Take 1 capsule (40 mg) by mouth 2 times daily 60 capsule 0     olopatadine (PATANOL) 0.1 % ophthalmic solution Place 1 drop into both eyes 2 times daily USE TWICE PER DAY AS NEEDED DURING THE MAIN ALLERGY SEASON(S) 5 mL 2     QUEtiapine (SEROQUEL) 25 MG tablet Take 0.5-1 tablets (12.5-25 mg) by mouth nightly as needed (for insomnia or for agitation) 30 tablet 2     amoxicillin (AMOXIL) 500 MG tablet Take one by mouth in the AM and one in the PM. (Patient not taking: Reported on 11/18/2020) 60 tablet 2     benzoyl peroxide 5 % external liquid Apply daily, can increase to 2-3 times a day if needed, If excessive dryness or peeling occurs reduce amount used. (Patient not taking: Reported on 11/18/2020) 226 g 0     clindamycin (CLINDAMAX) 1 % external gel Apply to face in the am. (Patient not taking: Reported on 9/2/2020) 60 g 3     omeprazole (PRILOSEC OTC) 20 MG EC tablet Take 1 tablet (20 mg) by mouth daily (Patient not taking: Reported on 11/18/2020) 90 tablet 1      tretinoin (RETIN-A) 0.025 % external cream Apply topically At Bedtime (Patient not taking: Reported on 9/2/2020) 45 g 0     No facility-administered encounter medications on file as of 11/18/2020.        Review Of Systems:  Skin: acne  Eyes: negative  Ears/Nose/Throat: negative  Respiratory: No shortness of breath, dyspnea on exertion, cough, or hemoptysis  Cardiovascular: negative  Gastrointestinal: negative  Genitourinary: negative  Musculoskeletal: negative  Neurologic: negative  Psychiatric: negative  Hematologic/Lymphatic/Immunologic: negative  Endocrine: negative      Objective:     /72   Pulse 68   Eyes: Conjunctivae/lids: Normal   ENT: Lips:  Normal  MSK: Normal  Cardiovascular: Peripheral edema none  Pulm: Breathing Normal  Neuro/Psych: Orientation: A/O x 3 Normal; Mood/Affect: Normal, NAD, WDWN  Pt accompanied by: self  Following areas examined: face, neck, ears  Junior skin type:v   Findings:  Brown scaly patches on forehead  Light brown/pink smooth macules on cheeks  Assessment and Plan:  1) cystic acne, PIH, medication monitoring, xerosis of skin, dermatitis   Continue moisturizing skin daily  Pt going to [facility] early 2021. Would like to be off of medication by then.   Stop all other ance medication  Apply a thin layer of triamcinlone to affected area 2x a day for up to 2 weeks. Tapering with improvement. Do not use on face or body folds.  Discussed side effects of topical steroids including but not limited to atrophy (skin thinning), striae (stretch marks) telangiectasias, steroid acne, and others. Do not apply to normal skin. Do not apply to discolored skin that does not have rash present. Educated patient on post inflammatory hyperpigmentation.   Standing CBC, CMP, and fasting lipids (for females include urine pregnancy test)  Ipledge reviewed with patient and Ipledge consent form complete  Patient place in ipledge system :yes 11/18/20  Prescription sent to pharmacy yes 11/18//20  Ipledge:  [id number]  Total: 3600  Target:  78.18kg  150mg/kg 42292  200mg/kg 63035  220mg/kg 84469  Return to clinic 30 days  No history of depression, anxiety,  or suicidal tendencies.  Isotretinoin (Accutane) education:  Do not share medication, do not donate blood, and do not get pregnant while on accutane.  While on Accutane: do not use other topical acne medications. Do not share medication. Do not get pregnant (including one month after stopping medication). Do not donate blood. Do not wax. Do not get laser, peels, or aggressive facials while on Accutane of for 6 months after.   Females must  their prescription within 7 days of having urine pregnancy test.  Dry lips and mouth, minor swelling of the eyelids or lips, crusty skin, nosebleeds, GI upset, or thinning of hair may occur. If any of these effects persist or worsen, tell your doctor or pharmacist promptly.   To relieve dry mouth, suck on (sugarless) hard candy or ice chips, chew (sugarless) gum, drink water.   Remember that your doctor has prescribed this medication because he or she has judged that the benefit to you is greater than the risk of side effects. Many people using this medication do not have serious side effects.   Contact office immediately if you have any of these unlikely but serious side effects: mental/mood changes (e.g., depression,  aggressive or violent behavior, and in rare cases, thoughts of suicide), tingling feeling in the skin, quick/severe sun sensitivity, back/joint/muscle pain, signs of infection (e.g., fever, persistent sore throat, painful swallowing, peeling skin on palms/soles.   Isotretinoin may infrequently cause disease of the pancreatitis, that may rarely be fatal. Stop taking this medication and contact office immediately if you develop: severe stomach pain severe or persistent GI upset,   Stop taking this medication and tell your doctor immediately if you develop these unlikely but very serious side effects: severe  headache, vision changes, ear ringing, hearling loss, chest pain, yellowing eyes, skin, dark urine, severe diarrhea, rectal bleeding,   Seek immediate medical attention if you notice any symptoms of a serious allergic reaction.  Wait 6 months after stopping accutane before getting piercing, tattoos, and/or laser treatment.    Accutane is discussed fully with the patient. It is a very effective drug to treat acne vulgaris but has many potential significant side effects. Chief among these are teratogensis, hepatic injury, dyslipidemia and severe drying of the mucous membranes. All of these issues have been discussed in details. Monthly blood tests to monitor lipids and liver functions will be necessary. Expect painful dryness and/or fissuring around the lips, eyes, and other moist areas of the body. Balms may be protective. Contact lens may be too painful to wear temporarily while on this drug. Episodes of significant depression have been reported, including suicidal ideation and attempts in rare cases. It may also cause pseudotumor cerebri and hyperostosis. The patient will report any such changes in mood, depressive symptoms or suicidal thoughts, headaches, joint or bone pains. There is also a possible association with inflammatory bowel disease, although this is unproven at this point.       Follow up in 30 days

## 2020-11-18 NOTE — TELEPHONE ENCOUNTER
Called and spoke to patient.    Informed patient Accutane has been sent to pharmacy.    ISOtretinoin (ACCUTANE) 40 MG capsule 60 capsule 0 11/18/2020  No   Sig - Route: Take 1 capsule (40 mg) by mouth 2 times daily - Oral   Sent to pharmacy as: ISOtretinoin 40 MG Oral Capsule (ACCUTANE)   Class: E-Prescribe   Order: [id number]   E-Prescribing Status: Receipt confirmed by pharmacy (11/18/2020  8:47 AM CST)     Patient will call back if [facility] does not have the order.    Patient voiced understanding.    Carine RN-BSN-PHN  [facility]  [phone]

## 2020-11-21 ENCOUNTER — OFFICE VISIT (OUTPATIENT)
Dept: URGENT CARE | Facility: URGENT CARE | Age: 25
End: 2020-11-21
Payer: OTHER MISCELLANEOUS

## 2020-11-21 VITALS
WEIGHT: 170 LBS | BODY MASS INDEX: 24.39 KG/M2 | DIASTOLIC BLOOD PRESSURE: 69 MMHG | HEART RATE: 60 BPM | OXYGEN SATURATION: 97 % | SYSTOLIC BLOOD PRESSURE: 117 MMHG | TEMPERATURE: 98.9 F

## 2020-11-21 DIAGNOSIS — M25.511 ACUTE PAIN OF RIGHT SHOULDER: Primary | ICD-10-CM

## 2020-11-21 PROCEDURE — 99213 OFFICE O/P EST LOW 20 MIN: CPT

## 2020-11-21 NOTE — PROGRESS NOTES
Subjective:   Jackie Medeiros is a 25 year old male who presents for   Chief Complaint   Patient presents with     Urgent Care     Musculoskeletal Problem     wrist pain for the last couple weeks. 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 He has right shoulder, arm and wrist pain from repetitive motions. He states that it is just tender and sore. He did not have an actual injury, he has not been hit by anything, no falls, no other type injury-- just sore from repetitive motion. He states he has used ibuprofen over the counter before, he has been scheduled for PT--- but states that he has never gone because the therapist cancelled on him multiple times. He again pleads that he just needs a few days off of work to rest the area. He denies neck pain, no radiculopathy, no neuropathy. He has no other concerns or issues to report. Jackie denies any other concerns including fevers/chills, cough or cold symptoms, headaches, vision changes, chest pain/pressure, difficulty breathing, SOB, abdominal pain, nausea, vomiting, diarrhea, dysuria, increasing weakness, increasing pain.     Patient Active Problem List    Diagnosis Date Noted     Vitamin D deficiency 01/24/2014     Priority: Medium     vitamin D level 23       Anemia 08/06/2013     Priority: Medium     Hyperlipidemia LDL goal <130 02/01/2013     Priority: Medium     Environmental allergies 01/06/2013     Priority: Medium     Abnormal laboratory test 01/01/2012     Priority: Medium     GERD (gastroesophageal reflux disease) 06/27/2010     Priority: Medium       Current Outpatient Medications   Medication     ISOtretinoin (ACCUTANE) 40 MG capsule     amoxicillin (AMOXIL) 500 MG tablet     benzoyl peroxide 5 % external liquid     cetirizine (ZYRTEC) 10 MG tablet     Cholecalciferol (VITAMIN D-3) 125 MCG (5000 UT) TABS     clindamycin (CLINDAMAX) 1 % external gel     olopatadine (PATANOL) 0.1 % ophthalmic  solution     omeprazole (PRILOSEC OTC) 20 MG EC tablet     QUEtiapine (SEROQUEL) 25 MG tablet     tretinoin (RETIN-A) 0.025 % external cream     triamcinolone (KENALOG) 0.025 % cream     No current facility-administered medications for this visit.        ROS:  As above per HPI    Objective:   /69   Pulse 60   Temp 98.9  F (37.2  C) (Oral)   Wt 77.1 kg (170 lb)   SpO2 97%   BMI 24.39 kg/m  , Body mass index is 24.39 kg/m .  Gen:  NAD, well-nourished, sitting in chair comfortably  HEENT: EOMI, sclera anicteric, Head normocephalic, nares patent; moist mucous membranes  Neck: without obvious abnormality  CV:  Hemodynamically stable  Pulm:  respirations without effort  ABD: soft, non-distended  Extrem: no cyanosis, edema or clubbing. ROM intact-- but somewhat limited by pain, CMS intact.   Skin: no obvious rashes or abnormalities  Psych: Euthymic, linear thoughts, normal rate of speech  Neuro: PERRLA, no facial asymmetry    No results found for any visits on 11/21/20.    Assessment & Plan:   Jackie MALHOTRA Robertrickrosa, 25 year old male who presents with:  Acute pain of right shoulder  -PT to eval and treat.   -Tylenol and ibuprofen over the counter for pain control.   -Voltaren Gel 1%, apply 4g to affected areas four times daily.   -Follow up with primary care provider to get a release to go back to work.   -Follow up as needed after that.     Electronically Signed: MARIELOS Torres, DNP, APRN, FNP-C  [place] [facility]    The use of Dragon/expressor software dictation services may have been used to construct the content in this note; any grammatical or spelling errors are unintentional. Please contact the author of this note directly if you are in need of any clarification.

## 2020-11-21 NOTE — PATIENT INSTRUCTIONS
Acute pain of right shoulder  -Physical therapy to eval and treat.   -Tylenol and ibuprofen over the counter for pain control.   -Voltaren Gel 1%, apply 4g to affected areas four times daily.   -Follow up with primary care provider to get a release to go back to work.   -Follow up as needed after that.    Patient Education     Shoulder Pain with Uncertain Cause   Shoulder pain can have many causes. Pain often comes from the structures that surround the shoulder joint. These are the joint capsule, ligaments, tendons, muscles, and bursa. Pain can also come from cartilage in the joint. Cartilage can become worn out or injured. It s important to know what s causing your pain so the healthcare provider can use the correct treatment. But sometimes it s difficult to find the exact cause of shoulder pain. You may need to see a specialist (orthopedist). You may also need special tests such as a CT scan or MRI. The provider may need to use special tools to look inside the joint (arthroscopy).  Shoulder pain can be treated with a sling or a device that keeps your shoulder from moving. You can take an anti-inflammatory medicine such as ibuprofen to ease pain. You may need to do special shoulder exercises. Follow up with a specialist if the pain is severe or doesn t go away after a few weeks.  Home care  Follow these tips when caring for yourself at home:    If a sling was given to you, leave it in place for the time advised by your healthcare provider. If you aren t sure how long to wear it, ask for advice. If the sling becomes loose, adjust it so that your forearm is level with the ground. Your shoulder should feel well supported.    Put an ice pack on the injured area for 20 minutes every 1 to 2 hours the first day. You can make your own ice pack by putting ice cubes in a plastic bag. Wrap the bag in a thin towel. Continue with ice packs 3 to 4 times a day for the next 2 days. Then use the pack as needed to ease pain and  swelling.    You may use acetaminophen or ibuprofen to control pain, unless another pain medicine was prescribed. If you have chronic liver or kidney disease, talk with your healthcare provider before using these medicines. Also talk with your provider if you ve ever had a stomach ulcer or digestive bleeding.    Shoulder pain may seem worse at night, when there is less to distract you from the pain. If you sleep on your side, try to keep weight off your painful shoulder. Propping pillows behind you may stop you from rolling over onto that shoulder during sleep.     Shoulder and elbow joints can become stiff if left in a sling for too long. You should start range of motion exercises about 7 to 10 days after the injury. Talk with your provider to find out what type of exercises to do and how soon to start.    You can take the sling off to shower or bathe.  Follow-up care  Follow up with your healthcare provider if you don t start to get better in the next 5 days.  When to seek medical advice  Call your healthcare provider right away if any of these occur:    Pain or swelling gets worse or continues for more than a few days    Your hand or fingers become cold, blue, numb, or tingly    Large amount of bruising on your shoulder or upper arm    Trouble moving your hand or fingers    Weakness in your hand or fingers    Your shoulder becomes stiff    It feels like your shoulder is popping out    You are less able to do your daily activities  Denisse last reviewed this educational content on 1/1/2020 2000-2020 [employer]. [address] [place] [address], [place] [address]. All rights reserved. This information is not intended as a substitute for professional medical care. Always follow your healthcare professional's instructions.

## 2020-11-27 ENCOUNTER — ANCILLARY PROCEDURE (OUTPATIENT)
Dept: GENERAL RADIOLOGY | Facility: CLINIC | Age: 25
End: 2020-11-27
Attending: PHYSICIAN ASSISTANT
Payer: COMMERCIAL

## 2020-11-27 ENCOUNTER — OFFICE VISIT (OUTPATIENT)
Dept: URGENT CARE | Facility: URGENT CARE | Age: 25
End: 2020-11-27
Payer: OTHER MISCELLANEOUS

## 2020-11-27 VITALS
BODY MASS INDEX: 24.39 KG/M2 | RESPIRATION RATE: 16 BRPM | OXYGEN SATURATION: 100 % | HEART RATE: 72 BPM | WEIGHT: 170 LBS | SYSTOLIC BLOOD PRESSURE: 115 MMHG | DIASTOLIC BLOOD PRESSURE: 68 MMHG

## 2020-11-27 DIAGNOSIS — M75.51 ACUTE SHOULDER BURSITIS, RIGHT: ICD-10-CM

## 2020-11-27 DIAGNOSIS — S49.91XA SHOULDER INJURY, RIGHT, INITIAL ENCOUNTER: Primary | ICD-10-CM

## 2020-11-27 PROCEDURE — 73030 X-RAY EXAM OF SHOULDER: CPT | Mod: RT | Performed by: PHYSICIAN ASSISTANT

## 2020-11-27 PROCEDURE — 99214 OFFICE O/P EST MOD 30 MIN: CPT | Performed by: PHYSICIAN ASSISTANT

## 2020-11-27 RX ORDER — IBUPROFEN 800 MG/1
800 TABLET, FILM COATED ORAL EVERY 8 HOURS PRN
Qty: 30 TABLET | Refills: 0 | Status: SHIPPED | OUTPATIENT
Start: 2020-11-27 | End: 2020-12-29

## 2020-11-27 NOTE — LETTER
[facility] [address] [address]  [phone]        2020    REPORT OF WORK ABILITY    PATIENT DATA  Employee Name: Jackie Medeiros        : 1995   [id number]  Work related injury: YES  Today's date: 2020    PROVIDER EVALUATION: Please fill in as needed.  Please give copy to employee for employer.  1. Diagnosis: right shoulder pain, bursitis  2. Treatment: ice, medication  3. Medication: motrin  NOTE: When ordering a medication, [place] Rules require Work Comp or WC on prescriptions.  4. Return to work date:       RESTRICTIONS:  No work  and .  Please limit heavy lifting, carrying, especially over the head lifting the next 7 days  Medical Examiner: Chilo Green, [facility] PA-C          Next appointment: 1-2 weeks    CC: Employer, Managed Care Plan/Payor, Patient

## 2020-11-28 NOTE — PROGRESS NOTES
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  How: overuse injury, lifting and carring.  The patient complained of moderate pain  and has had decreased ROM.  Pain exacerbated by movement.  Relieved by rest.  He treated it initially with Ibuprofen. This is the first time this type of injury has occurred to this patient.     Past Medical History:   Diagnosis Date     Abnormal laboratory test 1/1/2012     Allergic state      Anemia 8/6/2013     Environmental allergies 1/6/2013     Vitamin D deficiency 1/24/14    vitamin D level 23     Allergies   Allergen Reactions     Dust Mites      Minocycline Hives     Social History     Tobacco Use     Smoking status: Never Smoker     Smokeless tobacco: Never Used   Substance Use Topics     Alcohol use: No     Family History   Problem Relation Age of Onset     Family History Negative No family hx of        ROS:  CONSTITUTIONAL:NEGATIVE for fever, chills, change in weight  INTEGUMENTARY/SKIN: NEGATIVE for worrisome rashes, moles or lesions  ENT/MOUTH: NEGATIVE for ear, mouth and throat problems  RESP:NEGATIVE for significant cough or SOB  CV: NEGATIVE for chest pain, palpitations or peripheral edema  GI: NEGATIVE for nausea, abdominal pain, heartburn, or change in bowel habits  MUSCULOSKELETAL: POSITIVE  for right shoulder pain and DROM  NEURO: POSITIVE for pain of shoulder    EXAM:   /68   Pulse 72   Resp 16   Wt 77.1 kg (170 lb)   SpO2 100%   BMI 24.39 kg/m    Gen: healthy, alert, active and healthy,alert,no distress  Extremity: shoulder has decreased ROM and pain lateral shoulder.   There is not compromise to the distal circulation.  Pulses are +2 and CRT is brisk  GENERAL APPEARANCE: healthy, alert and no distress  CHEST:  clear to auscultation  CV: regular rate and rhythm  EXTREMITIES: peripheral pulses normal  MS:  decreased range of motion  and tender to palpation lateral shoulder  SKIN: no suspicious lesions or rashes  NEURO: Normal strength and tone, sensory exam grossly normal, mentation intact and speech normal    X-RAY was done and negative for acute changes including fracture Xray read by Chilo Green PA-C at time of visit    ASSESSMENT/PLAN:      ICD-10-CM    1. Shoulder injury, right, initial encounter  S49.91XA XR Shoulder Right G/E 3 Views     ibuprofen (ADVIL/MOTRIN) 800 MG tablet   2. Acute shoulder bursitis, right  M75.51 ibuprofen (ADVIL/MOTRIN) 800 MG tablet     ORTHOPEDICS PEDS REFERRAL       Ice compresses  Motrin for inflammation and pain  ROM exercises  Note written for work, restrictions  If persistent pain then follow up with orthopedics

## 2020-12-03 ENCOUNTER — MYC MEDICAL ADVICE (OUTPATIENT)
Dept: INTERNAL MEDICINE | Facility: CLINIC | Age: 25
End: 2020-12-03

## 2020-12-04 ENCOUNTER — VIRTUAL VISIT (OUTPATIENT)
Dept: INTERNAL MEDICINE | Facility: CLINIC | Age: 25
End: 2020-12-04
Payer: COMMERCIAL

## 2020-12-04 DIAGNOSIS — R45.1 AGITATION: ICD-10-CM

## 2020-12-04 DIAGNOSIS — Z23 NEED FOR IMMUNIZATION AGAINST TYPHOID: ICD-10-CM

## 2020-12-04 DIAGNOSIS — F51.01 PRIMARY INSOMNIA: ICD-10-CM

## 2020-12-04 DIAGNOSIS — Z71.84 COUNSELING ABOUT TRAVEL: Primary | ICD-10-CM

## 2020-12-04 DIAGNOSIS — K21.9 GASTROESOPHAGEAL REFLUX DISEASE WITHOUT ESOPHAGITIS: ICD-10-CM

## 2020-12-04 DIAGNOSIS — J30.2 SEASONAL ALLERGIC RHINITIS, UNSPECIFIED TRIGGER: ICD-10-CM

## 2020-12-04 PROCEDURE — 99401 PREV MED CNSL INDIV APPRX 15: CPT | Mod: 95 | Performed by: INTERNAL MEDICINE

## 2020-12-04 RX ORDER — PANTOPRAZOLE SODIUM 40 MG/1
40 TABLET, DELAYED RELEASE ORAL DAILY
Qty: 90 TABLET | Refills: 1 | Status: SHIPPED | OUTPATIENT
Start: 2020-12-04 | End: 2021-08-17

## 2020-12-04 RX ORDER — CETIRIZINE HYDROCHLORIDE 10 MG/1
10 TABLET ORAL DAILY
Qty: 90 TABLET | Refills: 1 | Status: SHIPPED | OUTPATIENT
Start: 2020-12-04 | End: 2022-09-14

## 2020-12-04 RX ORDER — CIPROFLOXACIN 500 MG/1
TABLET, FILM COATED ORAL
Qty: 12 TABLET | Refills: 0 | Status: SHIPPED | OUTPATIENT
Start: 2020-12-04 | End: 2021-03-04

## 2020-12-04 RX ORDER — ATOVAQUONE AND PROGUANIL HYDROCHLORIDE 250; 100 MG/1; MG/1
TABLET, FILM COATED ORAL
Qty: 100 TABLET | Refills: 0 | Status: SHIPPED | OUTPATIENT
Start: 2020-12-04 | End: 2021-03-14

## 2020-12-04 RX ORDER — QUETIAPINE FUMARATE 25 MG/1
12.5-25 TABLET, FILM COATED ORAL
Qty: 90 TABLET | Refills: 1 | Status: SHIPPED | OUTPATIENT
Start: 2020-12-04 | End: 2020-12-31

## 2020-12-04 NOTE — PROGRESS NOTES
"Jackie Medeiros is a 25 year old male who is being evaluated via a billable video visit.      The patient has been notified of following:     \"This video visit will be conducted via a call between you and your physician/provider. We have found that certain health care needs can be provided without the need for an in-person physical exam.  This service lets us provide the care you need with a video conversation.  If a prescription is necessary we can send it directly to your pharmacy.  If lab work is needed we can place an order for that and you can then stop by our lab to have the test done at a later time.    Video visits are billed at different rates depending on your insurance coverage.  Please reach out to your insurance provider with any questions.    If during the course of the call the physician/provider feels a video visit is not appropriate, you will not be charged for this service.\"    Patient has given verbal consent for Video visit? Yes  How would you like to obtain your AVS? MyChart  If you are dropped from the video visit, the video invite should be resent to: Text to cell phone: [phone]  Will anyone else be joining your video visit? No    Subjective     Jackie Medeiros is a 25 year old male who presents today via video visit for the following health issues:    HPI     Chief Complaint   Patient presents with     Travel Clinic     patient is traveling to [facility] tomorrow and wants to address any medications that are needed before traveling along with his regular medications          1.  Traveling to [facility] for approximately next 2 to 3 months to attend to his father who is very ill and elderly.  His father may require some sort of surgical procedure, details or not known.  He is leaving for [facility] on an airplane tomorrow.      He underwent rapid COVID-19 testing earlier this morning and was negative.    He will be traveling initially to [facility] for a few days then and to an outer area of [facility] " "where there is a lot of malaria and typhoid fever.    He is taken antimalarial medicine in the past without side effects.  He has taken the IM typhoid vaccine in the past without side effects.    He would like to have antibiotics in case of traveler's diarrhea.    2.  Was given quetiapine to help with sleeping.  He is found this very helpful and usually gets between 6 and 7 hours of sleep as the medicine is able to \"quiet his mind \".    3.  Takes omeprazole daily for reflux.  He would like continuation of this medication to be refilled.            Review of Systems   Constitutional, HEENT, cardiovascular, pulmonary, gi and gu systems are negative, except as otherwise noted.      Objective    Vitals - Patient Reported  Weight (Patient Reported): 79.8 kg (176 lb)  Height (Patient Reported): 177.8 cm (5' 10\")  BMI (Based on Pt Reported Ht/Wt): 25.25  Temperature (Patient Reported): 97.3  F (36.3  C)      Vitals:  No vitals were obtained today due to virtual visit.    Physical Exam     GENERAL: Healthy, alert and no distress  EYES: Eyes grossly normal to inspection.  No discharge or erythema, or obvious scleral/conjunctival abnormalities.  RESP: No audible wheeze, cough, or visible cyanosis.  No visible retractions or increased work of breathing.    SKIN: Visible skin clear. No significant rash, abnormal pigmentation or lesions.  NEURO: Cranial nerves grossly intact.  Mentation and speech appropriate for age.  PSYCH: Mentation appears normal, affect normal/bright, judgement and insight intact, normal speech and appearance well-groomed.                (Z71.84) Counseling about travel  (primary encounter diagnosis)  Comment: Discussed issues of general travel medicine.    Discussed importance of water purity and traveller's diarrhea.  Gave RX for Ciprofloxin 500 BID for 3 days in the event of severe traveller's diarrhea, (discussed difference between routine loose stool from travelling vs traveller's diarrhea).  Pt voices " understanding how and when to use the abx.    Based on pts travel destination and travel plans, malaria prophylaxis is indicated.  If prophylaxis indicated, discussed either Malarone daily (start 2 days before travel, continue until for one week after return) or mefloquine (start 1 week before, weeks there and 4 weeks after return).   The patient does need typhoid vaccine.   I contacted [facility] and they are able to give him the typhoid shot this afternoon at their pharmacy.  Discussed general bug avoidance measures.    Gave recs as to OTC meds to bring along.   Plan: atovaquone-proguanil (MALARONE) 250-100 MG         tablet, ciprofloxacin (CIPRO) 500 MG tablet            (Z23) Need for immunization against typhoid  Comment: Typhoid IM vaccine to be given today at [facility].  He should consider the oral typhoid vaccine next time which would provide 5 years of protection.  Plan:     (K21.9) Gastroesophageal reflux disease without esophagitis  Comment: Continue medication, insurance formulary seems to prefer pantoprazole.  Plan: pantoprazole (PROTONIX) 40 MG EC tablet            (F51.01) Primary insomnia  Comment: Quetiapine is helped sleeping considerably.  Continue this medicine.  Plan: QUEtiapine (SEROQUEL) 25 MG tablet            (R45.1) Agitation  Comment: As above  Plan: QUEtiapine (SEROQUEL) 25 MG tablet            (J30.2) Seasonal allergic rhinitis, unspecified trigger  Comment:   Plan: cetirizine (ZYRTEC) 10 MG tablet               Video-Visit Details    Type of service:  Video Visit    Video Start Time: 4:05 PM      Video End Time:4:25 PM    Originating Location (pt. Location): Home    Distant Location (provider location):  [facility]     Platform used for Video Visit: Candido

## 2020-12-04 NOTE — PATIENT INSTRUCTIONS
*  Change omeprazole to pantoprazole 40 mg once per day due to insurance preference.     *  Continue Quetiapine (generic Seroquel) 12.5 mg ( 1/2 of 25 mg tablet) or 25 mg twice per day as needed for acute agitation, or at bedtime as needed.  Main side effects of Quetiapine (Seroquel), including risk of drowsiness, memory issues, nightmares, and sleep walking.  Do not to drink any alcohol or operate heavy machinery after taking this medication and to call if any side effects occur.          OVERSEAS TRAVEL RECOMMENDATIONS:     Try to drink only clean water.  This will lower the chance of traveller's diarrhea.    Bring OTC meds such as Tylenol, Mortin, cold medication and benadryl.    Benadryl can help with sleeping, help with allergic reactions.    TUMS, Maalox, Zantac all can help upset stomachs.    Insect bite prevention.      *  Wear your face mask, wash your hands often to reduce the risk of Covid 19 infection.        *  Get the influenza vaccine today, it would be a very good idea fgor you to get this if you are travelling overseas.       Malaria prevention as indicated by prevalence of malaria in your destination:    *  Based on your destination, malaria prevetnion is indicated.      *  To prevent malaria: take Malarone once per day every day, starting 1-2 days before entering the malaria infested area, every day in the area and for 1 week after leaving the malaria area.       *  Based on your destination, vaccination against Typhoid fever is indicated.     --Typhoid shot today at [facility].  This will give you protection against typhoid for 2 years.       *  Ciprofloxin 500 mg twice per day for 3 days in the event of SEVERE diarrhea only.    Do not take for simple loose stools or mild diarrhea.     Ciprofloxin works very well for travellrs diarrhea, but can also cause a specific bad type fo diarrhea on its own which is why we want to be careful with using it only if needed.      *  For simple mild loose  stools, take Pepto Bismol tablest or liquid.  This will usually work.  Beware, this may make your stools turn dark or black, but not the kind of black stools that we consider dangerous or indicating intestinal problems.      *  If you find out about any other vaccinations or recommendation specific to your destination, be sure to let us know.

## 2020-12-05 ENCOUNTER — MYC MEDICAL ADVICE (OUTPATIENT)
Dept: INTERNAL MEDICINE | Facility: CLINIC | Age: 25
End: 2020-12-05

## 2020-12-07 NOTE — TELEPHONE ENCOUNTER
According to our conversation on 12/4, He was going to leave for [facility] on the following day on Saturday 12/5/20 so he may already be en route.    He should have taken the typhoid vaccine at the pharmacy and just been done with it.     If he has already left for [place], then nothing I can do for him.     If he is in town, then we can see about the oral typhoid vaccine, but this may be a problem since it needs to be kept cool until the tablets are taken.

## 2020-12-28 DIAGNOSIS — M75.51 ACUTE SHOULDER BURSITIS, RIGHT: ICD-10-CM

## 2020-12-28 DIAGNOSIS — S49.91XA SHOULDER INJURY, RIGHT, INITIAL ENCOUNTER: ICD-10-CM

## 2020-12-29 RX ORDER — IBUPROFEN 800 MG/1
800 TABLET, FILM COATED ORAL EVERY 8 HOURS PRN
Qty: 30 TABLET | Refills: 8 | Status: SHIPPED | OUTPATIENT
Start: 2020-12-29 | End: 2024-04-26

## 2020-12-31 DIAGNOSIS — F51.01 PRIMARY INSOMNIA: ICD-10-CM

## 2020-12-31 DIAGNOSIS — R45.1 AGITATION: ICD-10-CM

## 2020-12-31 RX ORDER — QUETIAPINE FUMARATE 25 MG/1
12.5-25 TABLET, FILM COATED ORAL
Qty: 90 TABLET | Refills: 1 | Status: SHIPPED | OUTPATIENT
Start: 2020-12-31 | End: 2023-02-10

## 2021-03-08 DIAGNOSIS — K31.89 GASTRIC HYPERACIDITY: ICD-10-CM

## 2021-03-12 RX ORDER — NICOTINE POLACRILEX 4 MG/1
20 GUM, CHEWING ORAL 2 TIMES DAILY
Qty: 60 TABLET | Refills: 1 | OUTPATIENT
Start: 2021-03-12

## 2021-04-17 ENCOUNTER — HEALTH MAINTENANCE LETTER (OUTPATIENT)
Age: 26
End: 2021-04-17

## 2021-04-23 ENCOUNTER — IMMUNIZATION (OUTPATIENT)
Dept: NURSING | Facility: CLINIC | Age: 26
End: 2021-04-23
Payer: COMMERCIAL

## 2021-04-23 PROCEDURE — 91300 PR COVID VAC PFIZER DIL RECON 30 MCG/0.3 ML IM: CPT

## 2021-04-23 PROCEDURE — 0001A PR COVID VAC PFIZER DIL RECON 30 MCG/0.3 ML IM: CPT

## 2021-05-14 ENCOUNTER — IMMUNIZATION (OUTPATIENT)
Dept: NURSING | Facility: CLINIC | Age: 26
End: 2021-05-14
Attending: INTERNAL MEDICINE
Payer: COMMERCIAL

## 2021-05-14 PROCEDURE — 0002A PR COVID VAC PFIZER DIL RECON 30 MCG/0.3 ML IM: CPT

## 2021-05-14 PROCEDURE — 91300 PR COVID VAC PFIZER DIL RECON 30 MCG/0.3 ML IM: CPT

## 2021-05-15 ENCOUNTER — VIRTUAL VISIT (OUTPATIENT)
Dept: URGENT CARE | Facility: CLINIC | Age: 26
End: 2021-05-15
Payer: COMMERCIAL

## 2021-05-15 DIAGNOSIS — Z20.822 ENCOUNTER FOR LABORATORY TESTING FOR COVID-19 VIRUS: Primary | ICD-10-CM

## 2021-05-15 PROCEDURE — 99212 OFFICE O/P EST SF 10 MIN: CPT | Mod: 95

## 2021-05-15 ASSESSMENT — ENCOUNTER SYMPTOMS
FATIGUE: 0
PALPITATIONS: 0
NAUSEA: 0
ACTIVITY CHANGE: 0
PARESTHESIAS: 0
ABDOMINAL PAIN: 0
SLEEP DISTURBANCE: 0
WHEEZING: 0
APPETITE CHANGE: 0
DIZZINESS: 0
COUGH: 0
CHEST TIGHTNESS: 0
DIAPHORESIS: 0
WEAKNESS: 0
SHORTNESS OF BREATH: 0
ADENOPATHY: 0
LIGHT-HEADEDNESS: 0
VOMITING: 0
CHILLS: 0
FEVER: 0

## 2021-05-15 NOTE — PROGRESS NOTES
No chief complaint on file.    VIRTUAL VISIT    SUBJECTIVE:  Jackie Medeiros is a 26 year old male who is requesting covid testing for Tuesday around 0915 with his children who are alreeady scheduled at [facility]. He is asymptomatic and needing a negative test for travel to [facility] Thursday.    Past Medical History:   Diagnosis Date     Abnormal laboratory test 1/1/2012     Allergic state      Anemia 8/6/2013     Environmental allergies 1/6/2013     Vitamin D deficiency 1/24/14    vitamin D level 23     amoxicillin (AMOXIL) 500 MG tablet, Take one by mouth in the AM and one in the PM. (Patient not taking: Reported on 11/18/2020)  benzoyl peroxide 5 % external liquid, Apply daily, can increase to 2-3 times a day if needed, If excessive dryness or peeling occurs reduce amount used. (Patient not taking: Reported on 11/18/2020)  cetirizine (ZYRTEC) 10 MG tablet, Take 1 tablet (10 mg) by mouth daily  Cholecalciferol (VITAMIN D-3) 125 MCG (5000 UT) TABS, Take 5,000 Units by mouth daily  clindamycin (CLINDAMAX) 1 % external gel, Apply to face in the am. (Patient not taking: Reported on 9/2/2020)  ibuprofen (ADVIL/MOTRIN) 800 MG tablet, Take 1 tablet (800 mg) by mouth every 8 hours as needed  ISOtretinoin (ACCUTANE) 40 MG capsule, Take 1 capsule (40 mg) by mouth 2 times daily  olopatadine (PATANOL) 0.1 % ophthalmic solution, Place 1 drop into both eyes 2 times daily USE TWICE PER DAY AS NEEDED DURING THE MAIN ALLERGY SEASON(S)  pantoprazole (PROTONIX) 40 MG EC tablet, Take 1 tablet (40 mg) by mouth daily  QUEtiapine (SEROQUEL) 25 MG tablet, Take 0.5-1 tablets (12.5-25 mg) by mouth nightly as needed (for insomnia or for agitation)  tretinoin (RETIN-A) 0.025 % external cream, Apply topically At Bedtime (Patient not taking: Reported on 9/2/2020)  triamcinolone (KENALOG) 0.025 % cream, Apply a thin layer to affected area BID x 2 weeks, tapering with improvement. (Patient not taking: Reported on 11/21/2020)    No current  facility-administered medications on file prior to visit.     Social History     Tobacco Use     Smoking status: Never Smoker     Smokeless tobacco: Never Used   Substance Use Topics     Alcohol use: No     Allergies   Allergen Reactions     Dust Mites      Minocycline Hives     Review of Systems   Constitutional: Negative for activity change, appetite change, chills, diaphoresis, fatigue and fever.   Respiratory: Negative for cough, chest tightness, shortness of breath and wheezing.    Cardiovascular: Negative for palpitations.   Gastrointestinal: Negative for abdominal pain, nausea and vomiting.   Skin: Negative for rash.   Neurological: Negative for dizziness, weakness, light-headedness and paresthesias.   Hematological: Negative for adenopathy.   Psychiatric/Behavioral: Negative for sleep disturbance.     OBJECTIVE:   There were no vitals taken for this visit.     Physical Exam unable to complete virtually.    ASSESSMENT:    ICD-10-CM    1. Encounter for laboratory testing for COVID-19 virus  Z20.822 Asymptomatic COVID-19 Virus (Coronavirus) by PCR     PLAN:   Patient Instructions   COVID test ordered per request  Nurse line given for scheduling  PCR takes about 1-2 days for results    Follow up with primary care provider with any problems, questions or concerns or if symptoms worsen or fail to improve. Patient agreed to plan and verbalized understanding.    Anna Horton, [facility]    Telephone visit 10 minutes, chart time 5 minutes.

## 2021-05-15 NOTE — PATIENT INSTRUCTIONS
COVID test ordered per request  Nurse line given for scheduling  PCR takes about 1-2 days for results

## 2021-05-17 ENCOUNTER — OFFICE VISIT (OUTPATIENT)
Dept: FAMILY MEDICINE | Facility: CLINIC | Age: 26
End: 2021-05-17
Payer: COMMERCIAL

## 2021-05-17 VITALS
HEIGHT: 70 IN | DIASTOLIC BLOOD PRESSURE: 60 MMHG | BODY MASS INDEX: 26.34 KG/M2 | OXYGEN SATURATION: 98 % | TEMPERATURE: 96.4 F | WEIGHT: 184 LBS | HEART RATE: 85 BPM | SYSTOLIC BLOOD PRESSURE: 97 MMHG

## 2021-05-17 DIAGNOSIS — K21.9 GASTROESOPHAGEAL REFLUX DISEASE, UNSPECIFIED WHETHER ESOPHAGITIS PRESENT: Primary | ICD-10-CM

## 2021-05-17 DIAGNOSIS — Z71.84 COUNSELING FOR TRAVEL: ICD-10-CM

## 2021-05-17 DIAGNOSIS — N62 GYNECOMASTIA, MALE: ICD-10-CM

## 2021-05-17 DIAGNOSIS — E55.9 VITAMIN D DEFICIENCY: ICD-10-CM

## 2021-05-17 DIAGNOSIS — F51.01 PRIMARY INSOMNIA: ICD-10-CM

## 2021-05-17 DIAGNOSIS — I95.9 HYPOTENSION, UNSPECIFIED HYPOTENSION TYPE: ICD-10-CM

## 2021-05-17 DIAGNOSIS — R45.1 AGITATION: ICD-10-CM

## 2021-05-17 PROCEDURE — 99214 OFFICE O/P EST MOD 30 MIN: CPT | Performed by: INTERNAL MEDICINE

## 2021-05-17 ASSESSMENT — MIFFLIN-ST. JEOR: SCORE: 1810.87

## 2021-05-17 NOTE — PROGRESS NOTES
Assessment & Plan   Problem List Items Addressed This Visit        Digestive    GERD (gastroesophageal reflux disease) - Primary    Vitamin D deficiency      Other Visit Diagnoses     Primary insomnia        Agitation        Gynecomastia, male        Counseling for travel        Hypotension, unspecified hypotension type             We called the pharmacy at [facility].  Patient has refills available there advised him to take the antimalarial medication called Malarone he will start 1 tablet daily 1 day prior to travel ,continue daily thereafter for 7 days after leaving the malaria area.  Also he was advised about Cipro  twice a day for 3 days for diarrhea, he is not interested in this medication but also  is concerning as he is on Seroquel ,will need an EKG to check QTC.  He will  Seroquel; sometimes he takes 1 time ,sometimes he takes 2 and even takes 3 tablets together; advised him to follow strictly the doses given /prescribed which is half to 1 tablet a day.  Patient is on chronic PPI therapy, he may need an EGD in future and will discuss when he comes back from [facility].  Advised patient that for all his travels abroad he will need to continue to follow with Dr. Moscoso at the travel clinic.  As for the enlarged breasts, he seemed disturbed by that, advised him to start exercising and hopefully losing weight also will help decrease the  gynecomastia.  Patient denies any headache or vision changes.  I did review CT scan done for him in 2016 and 12's were done for headaches and they were reported as normal head CT, no intracranial pathology except for right sphenoid sinus opacification in the 2012 imaging, advised him CT scans of the head are reassuring, no mention of any pituitary tumors or lesions but MRI would be better work-up but he has no symptoms.  We can refer him to endocrinology for further hormonal work-up when he comes back from [facility] and he is in agreement.  Advised to keep well-hydrated push  "more fluids, he denies any dizziness or orthostatic dizziness.  Patient reiterated understanding had no further questions         BMI:   Estimated body mass index is 26.4 kg/m  as calculated from the following:    Height as of this encounter: 1.778 m (5' 10\").    Weight as of this encounter: 83.5 kg (184 lb).   Weight management plan: Discussed healthy diet and exercise guidelines    Work on weight loss  Regular exercise  See Patient Instructions    Return in about 3 months (around 8/17/2021), or if symptoms worsen or fail to improve, for As needed and if symptoms worsen, other.    Eloisa An MD  [facility] EFREN Mejia is a 26 year old who presents for the following health issues   HPI   Patient presenting for establishing care.  He used to see Dr. Moscoso who had seen him for travel clinic back in December.  Dr. Moscoso sent him refills on his medications, patient was not aware that he had refills at the pharmacy.  Patient stated that he lives closer to this clinic.  When asked why he used Seroquel he states for insomnia and some agitation episodes.  He does describe that he uses pantoprazole daily because of GERD symptoms.  He was requesting omeprazole but it seems that his insurance will cover Protonix.  Patient was made aware of that the pharmacy has refills also  He traveled back in December to [facility],  he did not take the antimalarial drugs that were prescribed ,he did not receive the typhoid vaccine when he went to the pharmacy.  Now he had his Covid vaccine 2 weeks ago.  Denies any other symptom complaints.    Review of Systems   Constitutional, HEENT, cardiovascular, pulmonary, gi and gu systems are negative, except as otherwise noted.      Objective    BP 97/60 (BP Location: Right arm, Patient Position: Chair, Cuff Size: Adult Regular)   Pulse 85   Temp 96.4  F (35.8  C) (Temporal)   Ht 1.778 m (5' 10\")   Wt 83.5 kg (184 lb)   SpO2 98%   BMI 26.40 kg/m    Body mass " index is 26.4 kg/m .  Physical Exam   GENERAL: healthy, alert and no distress  EYES: Eyes grossly normal to inspection, PERRL and conjunctivae and sclerae normal  HENT: ear canals and TM's normal, nose and mouth without ulcers or lesions  NECK: no adenopathy, no asymmetry, masses, or scars and thyroid normal to palpation  RESP: lungs clear to auscultation - no rales, rhonchi or wheezes  Chest: He has enlarged breast; right more than left.    CV: regular rate and rhythm, normal S1 S2, no S3 or S4, no murmur, click or rub, no peripheral edema and peripheral pulses strong  ABDOMEN: soft, nontender, no hepatosplenomegaly, no masses and bowel sounds normal  MS: no gross musculoskeletal defects noted, no edema  SKIN: no suspicious lesions or rashes  NEURO: Normal strength and tone, mentation intact and speech normal  PSYCH: mentation appears normal, affect normal/bright    Office Visit on 11/18/2020   Component Date Value Ref Range Status     WBC 11/18/2020 4.4  4.0 - 11.0 10e9/L Final     RBC Count 11/18/2020 5.03  4.4 - 5.9 10e12/L Final     Hemoglobin 11/18/2020 14.3  13.3 - 17.7 g/dL Final     Hematocrit 11/18/2020 42.1  40.0 - 53.0 % Final     MCV 11/18/2020 84  78 - 100 fl Final     MCH 11/18/2020 28.4  26.5 - 33.0 pg Final     MCHC 11/18/2020 34.0  31.5 - 36.5 g/dL Final     RDW 11/18/2020 13.3  10.0 - 15.0 % Final     Platelet Count 11/18/2020 195  150 - 450 10e9/L Final     Sodium 11/18/2020 137  133 - 144 mmol/L Final     Potassium 11/18/2020 4.1  3.4 - 5.3 mmol/L Final     Chloride 11/18/2020 105  94 - 109 mmol/L Final     Carbon Dioxide 11/18/2020 29  20 - 32 mmol/L Final     Anion Gap 11/18/2020 3  3 - 14 mmol/L Final     Glucose 11/18/2020 90  70 - 99 mg/dL Final     Urea Nitrogen 11/18/2020 11  7 - 30 mg/dL Final     Creatinine 11/18/2020 0.72  0.66 - 1.25 mg/dL Final     GFR Estimate 11/18/2020 >90  >60 mL/min/[1.73_m2] Final    Comment: Non  GFR Calc  Starting 12/18/2018, serum creatinine  based estimated GFR (eGFR) will be   calculated using the Chronic Kidney Disease Epidemiology Collaboration   (CKD-EPI) equation.       GFR Estimate If Black 11/18/2020 >90  >60 mL/min/[1.73_m2] Final    Comment:  GFR Calc  Starting 12/18/2018, serum creatinine based estimated GFR (eGFR) will be   calculated using the Chronic Kidney Disease Epidemiology Collaboration   (CKD-EPI) equation.       Calcium 11/18/2020 9.2  8.5 - 10.1 mg/dL Final     Bilirubin Total 11/18/2020 0.3  0.2 - 1.3 mg/dL Final     Albumin 11/18/2020 3.9  3.4 - 5.0 g/dL Final     Protein Total 11/18/2020 7.7  6.8 - 8.8 g/dL Final     Alkaline Phosphatase 11/18/2020 96  40 - 150 U/L Final     ALT 11/18/2020 31  0 - 70 U/L Final     AST 11/18/2020 20  0 - 45 U/L Final     Cholesterol 11/18/2020 177  <200 mg/dL Final     Triglycerides 11/18/2020 147  <150 mg/dL Final     HDL Cholesterol 11/18/2020 43  >39 mg/dL Final     LDL Cholesterol Calculated 11/18/2020 105* <100 mg/dL Final    Comment: Above desirable:  100-129 mg/dl  Borderline High:  130-159 mg/dL  High:             160-189 mg/dL  Very high:       >189 mg/dl       Non HDL Cholesterol 11/18/2020 134* <130 mg/dL Final    Comment: Above Desirable:  130-159 mg/dl  Borderline high:  160-189 mg/dl  High:             190-219 mg/dl  Very high:       >219 mg/dl       % Neutrophils 11/18/2020 35.0  % Final     % Lymphocytes 11/18/2020 55.0  % Final     % Monocytes 11/18/2020 7.0  % Final     % Eosinophils 11/18/2020 2.0  % Final     % Basophils 11/18/2020 1.0  % Final     Absolute Neutrophil 11/18/2020 1.5* 1.6 - 8.3 10e9/L Final     Absolute Lymphocytes 11/18/2020 2.5  0.8 - 5.3 10e9/L Final     Absolute Monocytes 11/18/2020 0.3  0.0 - 1.3 10e9/L Final     Absolute Eosinophils 11/18/2020 0.1  0.0 - 0.7 10e9/L Final     Absolute Basophils 11/18/2020 0.0  0.0 - 0.2 10e9/L Final     Platelet Estimate 11/18/2020 Automated count confirmed.  Platelet morphology is normal.   Final     Diff  Method 11/18/2020 Manual Differential   Final

## 2021-05-18 DIAGNOSIS — Z20.822 ENCOUNTER FOR LABORATORY TESTING FOR COVID-19 VIRUS: ICD-10-CM

## 2021-05-18 LAB
LABORATORY COMMENT REPORT: NORMAL
SARS-COV-2 RNA RESP QL NAA+PROBE: NEGATIVE
SARS-COV-2 RNA RESP QL NAA+PROBE: NORMAL
SPECIMEN SOURCE: NORMAL
SPECIMEN SOURCE: NORMAL

## 2021-05-18 PROCEDURE — U0005 INFEC AGEN DETEC AMPLI PROBE: HCPCS | Performed by: NURSE PRACTITIONER

## 2021-05-18 PROCEDURE — U0003 INFECTIOUS AGENT DETECTION BY NUCLEIC ACID (DNA OR RNA); SEVERE ACUTE RESPIRATORY SYNDROME CORONAVIRUS 2 (SARS-COV-2) (CORONAVIRUS DISEASE [COVID-19]), AMPLIFIED PROBE TECHNIQUE, MAKING USE OF HIGH THROUGHPUT TECHNOLOGIES AS DESCRIBED BY CMS-2020-01-R: HCPCS | Performed by: NURSE PRACTITIONER

## 2021-05-25 ENCOUNTER — TELEPHONE (OUTPATIENT)
Dept: FAMILY MEDICINE | Facility: CLINIC | Age: 26
End: 2021-05-25

## 2021-05-25 DIAGNOSIS — Z11.59 SCREENING FOR VIRAL DISEASE: Primary | ICD-10-CM

## 2021-05-25 NOTE — TELEPHONE ENCOUNTER
Reason for Call: Request for an order or referral:    Order or referral being requested: COVID-19 test-PCR    Date needed: as soon as possible    Has the patient been seen by the PCP for this problem? Not Applicable    Additional comments: Has been traveling/possible contact    Phone number Patient can be reached at:  [phone]    Best Time:  Any    Can we leave a detailed message on this number?  YES    Call taken on 5/25/2021 at 3:43 PM by Katina Armenta

## 2021-05-29 ENCOUNTER — APPOINTMENT (OUTPATIENT)
Dept: URGENT CARE | Facility: CLINIC | Age: 26
End: 2021-05-29
Payer: COMMERCIAL

## 2021-05-29 DIAGNOSIS — Z11.59 SCREENING FOR VIRAL DISEASE: ICD-10-CM

## 2021-05-29 PROCEDURE — U0003 INFECTIOUS AGENT DETECTION BY NUCLEIC ACID (DNA OR RNA); SEVERE ACUTE RESPIRATORY SYNDROME CORONAVIRUS 2 (SARS-COV-2) (CORONAVIRUS DISEASE [COVID-19]), AMPLIFIED PROBE TECHNIQUE, MAKING USE OF HIGH THROUGHPUT TECHNOLOGIES AS DESCRIBED BY CMS-2020-01-R: HCPCS | Performed by: INTERNAL MEDICINE

## 2021-05-29 PROCEDURE — U0005 INFEC AGEN DETEC AMPLI PROBE: HCPCS | Performed by: INTERNAL MEDICINE

## 2021-05-29 NOTE — LETTER
June 1, 2021      Jackie Medeiros  [address] [place] [address]        Dear ,    We are writing to inform you of your test results.    The following letter pertains to your most recent diagnostic tests:     Good news! This is a negative COVID test result.     Resulted Orders   Asymptomatic COVID-19 Virus (Coronavirus) by PCR   Result Value Ref Range    COVID-19 Virus PCR to [facility] - Source Nasopharyngeal     COVID-19 Virus PCR to [facility] - Result       Test received-See reflex to IDDL test SARS CoV2 (COVID-19) Virus RT-PCR   SARS-CoV-2 COVID-19 Virus (Coronavirus) by PCR   Result Value Ref Range    SARS-CoV-2 Virus Specimen Source Nasopharyngeal     SARS-CoV-2 PCR Result NEGATIVE       Comment:      SARS-CoV2 (COVID-19) RNA not detected, presumed negative.    SARS-CoV-2 PCR Comment       Testing was performed using the Xpert Xpress SARS-CoV-2 Assay on the Cepheid Gene-Xpert   Instrument Systems. Additional information about this Emergency Use Authorization (EUA)   assay can be found via the Lab Guide.        Comment:      This test should be ordered for the detection of SARS-CoV-2 in individuals who   meet SARS-CoV-2 clinical and/or epidemiological criteria. Test performance is   unknown in asymptomatic patients.  This test is for in vitro diagnostic use under the FDA EUA for laboratories   certified under CLIA to perform high complexity testing. This test has not   been FDA cleared or approved.  A negative result does not rule out the presence of PCR inhibitors in the   specimen or target RNA in concentration below the limit of detection for the   assay. The possibility of a false negative should be considered if the   patient's recent exposure or clinical presentation suggests COVID-19.  This test was validated by the [facility] Infectious Diseases   Diagnostic Laboratory. This laboratory is certified under the Clinical   Laboratory Improvement Amendments of 1988 (CLIA-88) as qualified  to perform   high complexity laboratory testing.         If you have any questions or concerns, please call the clinic at the number listed above.       Sincerely,      Filipe Lockhart MD

## 2021-06-01 NOTE — RESULT ENCOUNTER NOTE
The following letter pertains to your most recent diagnostic tests:    Good news! This is a negative COVID test result.    Sincerely,    Dr. Lockhart

## 2021-08-16 DIAGNOSIS — K21.9 GASTROESOPHAGEAL REFLUX DISEASE WITHOUT ESOPHAGITIS: ICD-10-CM

## 2021-08-17 RX ORDER — PANTOPRAZOLE SODIUM 40 MG/1
40 TABLET, DELAYED RELEASE ORAL DAILY
Qty: 90 TABLET | Refills: 2 | Status: SHIPPED | OUTPATIENT
Start: 2021-08-17 | End: 2022-07-26

## 2021-09-08 ENCOUNTER — OFFICE VISIT (OUTPATIENT)
Dept: FAMILY MEDICINE | Facility: CLINIC | Age: 26
End: 2021-09-08
Payer: COMMERCIAL

## 2021-09-08 VITALS
RESPIRATION RATE: 16 BRPM | HEART RATE: 97 BPM | DIASTOLIC BLOOD PRESSURE: 78 MMHG | SYSTOLIC BLOOD PRESSURE: 115 MMHG | BODY MASS INDEX: 26.04 KG/M2 | WEIGHT: 186 LBS | HEIGHT: 71 IN | OXYGEN SATURATION: 97 % | TEMPERATURE: 97.7 F

## 2021-09-08 DIAGNOSIS — R30.0 DYSURIA: Primary | ICD-10-CM

## 2021-09-08 DIAGNOSIS — Z11.3 SCREEN FOR STD (SEXUALLY TRANSMITTED DISEASE): ICD-10-CM

## 2021-09-08 LAB
ALBUMIN UR-MCNC: NEGATIVE MG/DL
APPEARANCE UR: CLEAR
BILIRUB UR QL STRIP: NEGATIVE
COLOR UR AUTO: YELLOW
GLUCOSE UR STRIP-MCNC: NEGATIVE MG/DL
HBV SURFACE AG SERPL QL IA: NONREACTIVE
HCV AB SERPL QL IA: NONREACTIVE
HGB UR QL STRIP: NEGATIVE
HIV 1+2 AB+HIV1 P24 AG SERPL QL IA: NONREACTIVE
KETONES UR STRIP-MCNC: NEGATIVE MG/DL
LEUKOCYTE ESTERASE UR QL STRIP: NEGATIVE
NITRATE UR QL: NEGATIVE
PH UR STRIP: 6 [PH] (ref 5–7)
SP GR UR STRIP: 1.02 (ref 1–1.03)
T PALLIDUM AB SER QL: NONREACTIVE
UROBILINOGEN UR STRIP-ACNC: 0.2 E.U./DL

## 2021-09-08 PROCEDURE — 87591 N.GONORRHOEAE DNA AMP PROB: CPT | Performed by: INTERNAL MEDICINE

## 2021-09-08 PROCEDURE — 87340 HEPATITIS B SURFACE AG IA: CPT | Performed by: INTERNAL MEDICINE

## 2021-09-08 PROCEDURE — 80048 BASIC METABOLIC PNL TOTAL CA: CPT | Performed by: INTERNAL MEDICINE

## 2021-09-08 PROCEDURE — 86780 TREPONEMA PALLIDUM: CPT | Performed by: INTERNAL MEDICINE

## 2021-09-08 PROCEDURE — 87491 CHLMYD TRACH DNA AMP PROBE: CPT | Performed by: INTERNAL MEDICINE

## 2021-09-08 PROCEDURE — 81003 URINALYSIS AUTO W/O SCOPE: CPT | Performed by: INTERNAL MEDICINE

## 2021-09-08 PROCEDURE — 86803 HEPATITIS C AB TEST: CPT | Performed by: INTERNAL MEDICINE

## 2021-09-08 PROCEDURE — 36415 COLL VENOUS BLD VENIPUNCTURE: CPT | Performed by: INTERNAL MEDICINE

## 2021-09-08 PROCEDURE — 99213 OFFICE O/P EST LOW 20 MIN: CPT | Performed by: INTERNAL MEDICINE

## 2021-09-08 PROCEDURE — 87389 HIV-1 AG W/HIV-1&-2 AB AG IA: CPT | Performed by: INTERNAL MEDICINE

## 2021-09-08 ASSESSMENT — MIFFLIN-ST. JEOR: SCORE: 1835.82

## 2021-09-08 NOTE — PROGRESS NOTES
"    Assessment & Plan     Dysuria and low back pain mid August-now resolved.  UA nonrevealing and symptoms have improved. No indication for ab. However due to recent sexual encounter will screen for STD. Check BMP.  - UA Macro with Reflex to Micro and Culture - lab collect  - NEISSERIA GONORRHOEA PCR  - CHLAMYDIA TRACHOMATIS PCR  - Basic metabolic panel  (Ca, Cl, CO2, Creat, Gluc, K, Na, BUN)    Screen for STD (sexually transmitted disease)  He would like to check for below STD-no rashes.  - HIV Antigen Antibody Combo [NIO0389]  - Treponema Abs w Reflex to RPR and Titer [NDB3254]  - Hepatitis C antibody [YJP450]  - Hepatitis B surface antigen [NBS622]  - NEISSERIA GONORRHOEA PCR  - CHLAMYDIA TRACHOMATIS PCR    Return if symptoms worsen or fail to improve.    Samantha Wilhelm DO  [facility] EFREN Mejia is a 26 year old who presents for the following health issues     HPI     States had low back pain in July. Went to be seen for this. Was diagnosed with UTI and treated with ab. This was in [facility]. Symptoms resolved. However symptoms returned in mid august (low back discomfort and dysuria), states these improved but wants to ensure no recurrent UTI. He does admit to sexual intercourse in [facility] mid August and interested in STD screening. No rashes or penile discharge. No dysuria at this time, states urinary patterns are baseline. Unknown if partner had any STDs.      Review of Systems   Constitutional, HEENT, cardiovascular, pulmonary, gi and gu systems are negative, except as otherwise noted.      Objective    /78 (BP Location: Left arm, Patient Position: Chair, Cuff Size: Adult Large)   Pulse 97   Temp 97.7  F (36.5  C) (Tympanic)   Resp 16   Ht 1.803 m (5' 11\")   Wt 84.4 kg (186 lb)   SpO2 97%   BMI 25.94 kg/m    Body mass index is 25.94 kg/m .  Physical Exam     GENERAL APPEARANCE: AAOx3, no distress. Well developed.    PSYCH: appropriate mood and affect.               "

## 2021-09-09 LAB
ANION GAP SERPL CALCULATED.3IONS-SCNC: <1 MMOL/L (ref 3–14)
BUN SERPL-MCNC: 11 MG/DL (ref 7–30)
C TRACH DNA SPEC QL NAA+PROBE: NEGATIVE
CALCIUM SERPL-MCNC: 8.8 MG/DL (ref 8.5–10.1)
CHLORIDE BLD-SCNC: 104 MMOL/L (ref 94–109)
CO2 SERPL-SCNC: 32 MMOL/L (ref 20–32)
CREAT SERPL-MCNC: 0.86 MG/DL (ref 0.66–1.25)
GFR SERPL CREATININE-BSD FRML MDRD: >90 ML/MIN/1.73M2
GLUCOSE BLD-MCNC: 92 MG/DL (ref 70–99)
N GONORRHOEA DNA SPEC QL NAA+PROBE: NEGATIVE
POTASSIUM BLD-SCNC: 4.2 MMOL/L (ref 3.4–5.3)
SODIUM SERPL-SCNC: 135 MMOL/L (ref 133–144)

## 2021-09-26 ENCOUNTER — HEALTH MAINTENANCE LETTER (OUTPATIENT)
Age: 26
End: 2021-09-26

## 2022-02-28 NOTE — TELEPHONE ENCOUNTER
Huddled with Dr. Moscoso and he is able to address this today thru virtual appt. Placed patient in his 320 appt but will need to call patient to go over screening and consent questions and change to virtual appt after doing so.   no chest pain and no edema.

## 2022-05-08 ENCOUNTER — HEALTH MAINTENANCE LETTER (OUTPATIENT)
Age: 27
End: 2022-05-08

## 2022-07-06 ENCOUNTER — NURSE TRIAGE (OUTPATIENT)
Dept: NURSING | Facility: CLINIC | Age: 27
End: 2022-07-06

## 2022-07-06 NOTE — TELEPHONE ENCOUNTER
Wasp bite on palm near thumb of right hand     No difficulty breathing  No difficulty swallowing       Reason for Disposition    Normal local reaction to bee, wasp, or yellow jacket sting    Additional Information    Negative: [1] Life-threatening reaction (anaphylaxis) in the past to sting AND [2] < 2 hours since sting    Negative: Attacked by swarm of bees now    Negative: Passed out (i.e., lost consciousness, collapsed and was not responding)    Negative: Wheezing, stridor, or difficulty breathing    Negative: [1] Hoarseness or cough AND [2] sudden onset following sting    Negative: [1] Tightness in the throat or chest AND [2] sudden onset following sting    Negative: [1] Swollen tongue or difficulty swallowing AND [2] sudden onset following sting    Negative: Sounds like a life-threatening emergency to the triager    Negative: Not a bee, wasp, hornet, or yellow jacket sting    Negative: Ring stuck on swollen finger (or toe) following bee sting    Negative: [1] Hives, itching, or swelling elsewhere on body (i.e., not a site of sting) AND [2] started within 2 hours of sting (Exception: only at site of sting)    Negative: [1] Vomiting or abdominal cramps AND [2] started within 2 hours of sting    Negative: [1] Gave epinephrine shot AND [2] no symptoms now    Negative: Sting inside the mouth    Negative: Sting on eyeball (e.g., cornea)    Negative: Patient sounds very sick or weak to the triager    Negative: More than 50 stings    Negative: [1] Fever AND [2] red area    Negative: [1] Fever AND [2] area is very tender to touch    Negative: [1] Red streak or red line AND [2] length > 2 inches (5 cm)    Negative: [1] Red or very tender (to touch) area AND [2] started over 24 hours after the sting    Negative: [1] Red or very tender (to touch) area AND [2] getting larger over 48 hours after the sting    Negative: Swelling is huge (e.g., > 4 inches or 10 cm, spreads beyond wrist or ankle)    Negative: [1] Hives, itching,  or swelling elsewhere on body (i.e., not a site of stings) AND [2] started over 2 hours after sting  (Exception: only at site of sting)    Negative: [1] Scab is present AND [2] it drains pus or increases in size AND [3] not improved after applying  antibiotic ointment for 2 days    Protocols used: BEE OR YELLOW JACKET STING-A-    Joya Potter RN on 7/6/2022 at 3:44 AM

## 2022-07-17 ENCOUNTER — HOSPITAL ENCOUNTER (EMERGENCY)
Facility: CLINIC | Age: 27
Discharge: HOME | End: 2022-07-17
Attending: PHYSICIAN ASSISTANT | Admitting: PHYSICIAN ASSISTANT
Payer: COMMERCIAL

## 2022-07-17 ENCOUNTER — APPOINTMENT (OUTPATIENT)
Dept: CT IMAGING | Facility: CLINIC | Age: 27
End: 2022-07-17
Attending: PHYSICIAN ASSISTANT
Payer: COMMERCIAL

## 2022-07-17 VITALS
BODY MASS INDEX: 26.92 KG/M2 | OXYGEN SATURATION: 99 % | RESPIRATION RATE: 16 BRPM | HEART RATE: 85 BPM | DIASTOLIC BLOOD PRESSURE: 6 MMHG | TEMPERATURE: 98 F | SYSTOLIC BLOOD PRESSURE: 112 MMHG | WEIGHT: 188 LBS | HEIGHT: 70 IN

## 2022-07-17 DIAGNOSIS — R11.10 VOMITING AND DIARRHEA: ICD-10-CM

## 2022-07-17 DIAGNOSIS — R19.7 VOMITING AND DIARRHEA: ICD-10-CM

## 2022-07-17 LAB
ALBUMIN SERPL-MCNC: 4.3 G/DL (ref 3.4–5)
ALP SERPL-CCNC: 94 U/L (ref 40–150)
ALT SERPL W P-5'-P-CCNC: 24 U/L (ref 0–70)
ANION GAP SERPL CALCULATED.3IONS-SCNC: 6 MMOL/L (ref 3–14)
AST SERPL W P-5'-P-CCNC: 19 U/L (ref 0–45)
BASOPHILS # BLD AUTO: 0 10E3/UL (ref 0–0.2)
BASOPHILS NFR BLD AUTO: 0 %
BILIRUB SERPL-MCNC: 0.6 MG/DL (ref 0.2–1.3)
BUN SERPL-MCNC: 16 MG/DL (ref 7–30)
CALCIUM SERPL-MCNC: 9 MG/DL (ref 8.5–10.1)
CHLORIDE BLD-SCNC: 107 MMOL/L (ref 94–109)
CO2 SERPL-SCNC: 29 MMOL/L (ref 20–32)
CREAT SERPL-MCNC: 0.87 MG/DL (ref 0.66–1.25)
EOSINOPHIL # BLD AUTO: 0.1 10E3/UL (ref 0–0.7)
EOSINOPHIL NFR BLD AUTO: 1 %
ERYTHROCYTE [DISTWIDTH] IN BLOOD BY AUTOMATED COUNT: 12.8 % (ref 10–15)
GFR SERPL CREATININE-BSD FRML MDRD: >90 ML/MIN/1.73M2
GLUCOSE BLD-MCNC: 98 MG/DL (ref 70–99)
HCT VFR BLD AUTO: 44.8 % (ref 40–53)
HGB BLD-MCNC: 14.8 G/DL (ref 13.3–17.7)
IMM GRANULOCYTES # BLD: 0 10E3/UL
IMM GRANULOCYTES NFR BLD: 0 %
LIPASE SERPL-CCNC: 101 U/L (ref 73–393)
LYMPHOCYTES # BLD AUTO: 2.2 10E3/UL (ref 0.8–5.3)
LYMPHOCYTES NFR BLD AUTO: 25 %
MCH RBC QN AUTO: 27.6 PG (ref 26.5–33)
MCHC RBC AUTO-ENTMCNC: 33 G/DL (ref 31.5–36.5)
MCV RBC AUTO: 84 FL (ref 78–100)
MONOCYTES # BLD AUTO: 0.8 10E3/UL (ref 0–1.3)
MONOCYTES NFR BLD AUTO: 9 %
NEUTROPHILS # BLD AUTO: 5.7 10E3/UL (ref 1.6–8.3)
NEUTROPHILS NFR BLD AUTO: 65 %
NRBC # BLD AUTO: 0 10E3/UL
NRBC BLD AUTO-RTO: 0 /100
PLATELET # BLD AUTO: 215 10E3/UL (ref 150–450)
POTASSIUM BLD-SCNC: 4.3 MMOL/L (ref 3.4–5.3)
PROT SERPL-MCNC: 7.9 G/DL (ref 6.8–8.8)
RBC # BLD AUTO: 5.36 10E6/UL (ref 4.4–5.9)
SODIUM SERPL-SCNC: 142 MMOL/L (ref 133–144)
WBC # BLD AUTO: 8.8 10E3/UL (ref 4–11)

## 2022-07-17 PROCEDURE — 250N000011 HC RX IP 250 OP 636: Performed by: EMERGENCY MEDICINE

## 2022-07-17 PROCEDURE — 85004 AUTOMATED DIFF WBC COUNT: CPT | Performed by: PHYSICIAN ASSISTANT

## 2022-07-17 PROCEDURE — 258N000003 HC RX IP 258 OP 636: Performed by: PHYSICIAN ASSISTANT

## 2022-07-17 PROCEDURE — 36415 COLL VENOUS BLD VENIPUNCTURE: CPT | Performed by: PHYSICIAN ASSISTANT

## 2022-07-17 PROCEDURE — 80053 COMPREHEN METABOLIC PANEL: CPT | Performed by: PHYSICIAN ASSISTANT

## 2022-07-17 PROCEDURE — 250N000011 HC RX IP 250 OP 636: Performed by: PHYSICIAN ASSISTANT

## 2022-07-17 PROCEDURE — 96361 HYDRATE IV INFUSION ADD-ON: CPT

## 2022-07-17 PROCEDURE — 99285 EMERGENCY DEPT VISIT HI MDM: CPT | Mod: 25

## 2022-07-17 PROCEDURE — 96374 THER/PROPH/DIAG INJ IV PUSH: CPT | Mod: 59

## 2022-07-17 PROCEDURE — 96375 TX/PRO/DX INJ NEW DRUG ADDON: CPT

## 2022-07-17 PROCEDURE — 74177 CT ABD & PELVIS W/CONTRAST: CPT

## 2022-07-17 PROCEDURE — 250N000009 HC RX 250: Performed by: PHYSICIAN ASSISTANT

## 2022-07-17 PROCEDURE — 83690 ASSAY OF LIPASE: CPT | Performed by: PHYSICIAN ASSISTANT

## 2022-07-17 RX ORDER — IOPAMIDOL 755 MG/ML
94 INJECTION, SOLUTION INTRAVASCULAR ONCE
Status: COMPLETED | OUTPATIENT
Start: 2022-07-17 | End: 2022-07-17

## 2022-07-17 RX ORDER — ONDANSETRON 4 MG/1
4 TABLET, ORALLY DISINTEGRATING ORAL EVERY 6 HOURS PRN
Qty: 10 TABLET | Refills: 0 | Status: SHIPPED | OUTPATIENT
Start: 2022-07-17 | End: 2022-07-20

## 2022-07-17 RX ORDER — METOCLOPRAMIDE HYDROCHLORIDE 5 MG/ML
5 INJECTION INTRAMUSCULAR; INTRAVENOUS ONCE
Status: COMPLETED | OUTPATIENT
Start: 2022-07-17 | End: 2022-07-17

## 2022-07-17 RX ORDER — FAMOTIDINE 20 MG/1
20 TABLET, FILM COATED ORAL 2 TIMES DAILY PRN
Qty: 10 TABLET | Refills: 0 | Status: SHIPPED | OUTPATIENT
Start: 2022-07-17 | End: 2022-07-22

## 2022-07-17 RX ORDER — ONDANSETRON 4 MG/1
4 TABLET, ORALLY DISINTEGRATING ORAL ONCE
Status: COMPLETED | OUTPATIENT
Start: 2022-07-17 | End: 2022-07-17

## 2022-07-17 RX ADMIN — SODIUM CHLORIDE 67 ML: 900 INJECTION INTRAVENOUS at 20:12

## 2022-07-17 RX ADMIN — FAMOTIDINE 20 MG: 10 INJECTION, SOLUTION INTRAVENOUS at 19:40

## 2022-07-17 RX ADMIN — SODIUM CHLORIDE 1000 ML: 9 INJECTION, SOLUTION INTRAVENOUS at 19:34

## 2022-07-17 RX ADMIN — ONDANSETRON 4 MG: 4 TABLET, ORALLY DISINTEGRATING ORAL at 19:10

## 2022-07-17 RX ADMIN — IOPAMIDOL 94 ML: 755 INJECTION, SOLUTION INTRAVENOUS at 20:10

## 2022-07-17 RX ADMIN — METOCLOPRAMIDE 5 MG: 5 INJECTION, SOLUTION INTRAMUSCULAR; INTRAVENOUS at 21:44

## 2022-07-17 ASSESSMENT — ENCOUNTER SYMPTOMS
FEVER: 0
ABDOMINAL PAIN: 1
VOMITING: 1
DIARRHEA: 1

## 2022-07-18 ENCOUNTER — HOSPITAL ENCOUNTER (EMERGENCY)
Facility: CLINIC | Age: 27
Discharge: HOME | End: 2022-07-18
Attending: EMERGENCY MEDICINE | Admitting: EMERGENCY MEDICINE
Payer: COMMERCIAL

## 2022-07-18 VITALS
OXYGEN SATURATION: 98 % | HEART RATE: 68 BPM | HEIGHT: 70 IN | TEMPERATURE: 98.2 F | WEIGHT: 185 LBS | DIASTOLIC BLOOD PRESSURE: 58 MMHG | SYSTOLIC BLOOD PRESSURE: 114 MMHG | BODY MASS INDEX: 26.48 KG/M2 | RESPIRATION RATE: 15 BRPM

## 2022-07-18 DIAGNOSIS — R10.13 EPIGASTRIC PAIN: ICD-10-CM

## 2022-07-18 DIAGNOSIS — R11.2 NON-INTRACTABLE VOMITING WITH NAUSEA, UNSPECIFIED VOMITING TYPE: ICD-10-CM

## 2022-07-18 LAB
ALBUMIN SERPL-MCNC: 4 G/DL (ref 3.4–5)
ALP SERPL-CCNC: 89 U/L (ref 40–150)
ALT SERPL W P-5'-P-CCNC: 24 U/L (ref 0–70)
ANION GAP SERPL CALCULATED.3IONS-SCNC: 4 MMOL/L (ref 3–14)
AST SERPL W P-5'-P-CCNC: 29 U/L (ref 0–45)
BASOPHILS # BLD AUTO: 0 10E3/UL (ref 0–0.2)
BASOPHILS NFR BLD AUTO: 0 %
BILIRUB SERPL-MCNC: 0.8 MG/DL (ref 0.2–1.3)
BUN SERPL-MCNC: 13 MG/DL (ref 7–30)
CALCIUM SERPL-MCNC: 8.9 MG/DL (ref 8.5–10.1)
CHLORIDE BLD-SCNC: 111 MMOL/L (ref 94–109)
CO2 SERPL-SCNC: 25 MMOL/L (ref 20–32)
CREAT SERPL-MCNC: 0.83 MG/DL (ref 0.66–1.25)
EOSINOPHIL # BLD AUTO: 0.1 10E3/UL (ref 0–0.7)
EOSINOPHIL NFR BLD AUTO: 2 %
ERYTHROCYTE [DISTWIDTH] IN BLOOD BY AUTOMATED COUNT: 13 % (ref 10–15)
GFR SERPL CREATININE-BSD FRML MDRD: >90 ML/MIN/1.73M2
GLUCOSE BLD-MCNC: 85 MG/DL (ref 70–99)
HCT VFR BLD AUTO: 41.9 % (ref 40–53)
HGB BLD-MCNC: 14.2 G/DL (ref 13.3–17.7)
IMM GRANULOCYTES # BLD: 0 10E3/UL
IMM GRANULOCYTES NFR BLD: 1 %
LIPASE SERPL-CCNC: 84 U/L (ref 73–393)
LYMPHOCYTES # BLD AUTO: 1.3 10E3/UL (ref 0.8–5.3)
LYMPHOCYTES NFR BLD AUTO: 31 %
MCH RBC QN AUTO: 28.2 PG (ref 26.5–33)
MCHC RBC AUTO-ENTMCNC: 33.9 G/DL (ref 31.5–36.5)
MCV RBC AUTO: 83 FL (ref 78–100)
MONOCYTES # BLD AUTO: 0.3 10E3/UL (ref 0–1.3)
MONOCYTES NFR BLD AUTO: 7 %
NEUTROPHILS # BLD AUTO: 2.4 10E3/UL (ref 1.6–8.3)
NEUTROPHILS NFR BLD AUTO: 59 %
NRBC # BLD AUTO: 0 10E3/UL
NRBC BLD AUTO-RTO: 0 /100
PLAT MORPH BLD: ABNORMAL
PLATELET # BLD AUTO: NORMAL 10*3/UL
POTASSIUM BLD-SCNC: 4.5 MMOL/L (ref 3.4–5.3)
PROT SERPL-MCNC: 7.5 G/DL (ref 6.8–8.8)
RBC # BLD AUTO: 5.03 10E6/UL (ref 4.4–5.9)
RBC MORPH BLD: ABNORMAL
SODIUM SERPL-SCNC: 140 MMOL/L (ref 133–144)
WBC # BLD AUTO: 4 10E3/UL (ref 4–11)

## 2022-07-18 PROCEDURE — 250N000013 HC RX MED GY IP 250 OP 250 PS 637: Performed by: EMERGENCY MEDICINE

## 2022-07-18 PROCEDURE — 250N000009 HC RX 250: Performed by: EMERGENCY MEDICINE

## 2022-07-18 PROCEDURE — 83690 ASSAY OF LIPASE: CPT | Performed by: EMERGENCY MEDICINE

## 2022-07-18 PROCEDURE — 99283 EMERGENCY DEPT VISIT LOW MDM: CPT

## 2022-07-18 PROCEDURE — 36415 COLL VENOUS BLD VENIPUNCTURE: CPT | Performed by: EMERGENCY MEDICINE

## 2022-07-18 PROCEDURE — 85048 AUTOMATED LEUKOCYTE COUNT: CPT | Performed by: EMERGENCY MEDICINE

## 2022-07-18 PROCEDURE — 82310 ASSAY OF CALCIUM: CPT | Performed by: EMERGENCY MEDICINE

## 2022-07-18 RX ORDER — ONDANSETRON 2 MG/ML
4 INJECTION INTRAMUSCULAR; INTRAVENOUS ONCE
Status: DISCONTINUED | OUTPATIENT
Start: 2022-07-18 | End: 2022-07-18 | Stop reason: HOSPADM

## 2022-07-18 RX ORDER — FAMOTIDINE 20 MG/1
20 TABLET, FILM COATED ORAL ONCE
Status: COMPLETED | OUTPATIENT
Start: 2022-07-18 | End: 2022-07-18

## 2022-07-18 RX ORDER — PROMETHAZINE HYDROCHLORIDE 25 MG/1
25 TABLET ORAL EVERY 6 HOURS PRN
Qty: 12 TABLET | Refills: 0 | Status: SHIPPED | OUTPATIENT
Start: 2022-07-18 | End: 2022-07-26

## 2022-07-18 RX ADMIN — LIDOCAINE HYDROCHLORIDE 30 ML: 20 SOLUTION ORAL; TOPICAL at 13:54

## 2022-07-18 RX ADMIN — FAMOTIDINE 20 MG: 20 TABLET ORAL at 13:52

## 2022-07-18 ASSESSMENT — ENCOUNTER SYMPTOMS
BLOOD IN STOOL: 0
SHORTNESS OF BREATH: 0
VOMITING: 1
DIARRHEA: 1
ABDOMINAL PAIN: 1

## 2022-07-18 NOTE — ED NOTES
Went into room to give patient his discharge instructions and he said he felt like he was going to vomit. Provider notified.

## 2022-07-18 NOTE — ED TRIAGE NOTES
N/V/D started 10 days ago after eating pot luck at work. Pt. States he can not keep anything down, will throw up whatever he eats.      Triage Assessment     Row Name 07/17/22 1905       Triage Assessment (Adult)    Airway WDL WDL       Respiratory WDL    Respiratory WDL WDL       Skin Circulation/Temperature WDL    Skin Circulation/Temperature WDL WDL       Cardiac WDL    Cardiac WDL WDL       Peripheral/Neurovascular WDL    Peripheral Neurovascular WDL WDL       Cognitive/Neuro/Behavioral WDL    Cognitive/Neuro/Behavioral WDL WDL

## 2022-07-18 NOTE — ED PROVIDER NOTES
"  History   Chief Complaint:  Vomiting    HPI   Jackie Medeiros is a 27 year old male with history of GERD who presents with diarrhea and vomiting for the past two weeks (07/08/2022). He was seen in the emergency departmemt last night for the same symptoms as he presents with today; he was discharged and prescribed famotidine and ondansetron. Jackie claims that he started experiencing diarrhea and vomiting after consuming brisket, spring rolls, and chips.  He emphasized that he has not been able to eat or drink- including water, mentioning that every time he eats or drinks he vomits immediately after. Jackie shares that he has not had relief from famotidine or ondansetron. He also complains of generalized abdominal pain that is tender to palpation. Denies blood in stool, chest pain, and shortness of breath. Denies drug and alcohol use. Jackie reports that his symptoms are similar to when he had H. Pylori (2013).  Diarrhea has been resolved for the last several days.    CT Abdomen Pelvis with Contrast 07/17/2022:  IMPRESSION:   1.  Negative CT examination of the abdomen and pelvis.    Review of Systems   Respiratory: Negative for shortness of breath.    Cardiovascular: Negative for chest pain.   Gastrointestinal: Positive for abdominal pain, diarrhea and vomiting. Negative for blood in stool.   All other systems reviewed and are negative.    Allergies:  Dust Mites  Minocycline     Medications:  Pantoprazole  Isotretinoin  Quetiapine     Past Medical History:     GERD  Environmental allergies  Vitamin D Deficiency   Insomnia   Cyclothymic disorder      Social History:  The patient presents to the ED alone  PCP: MD Cole     Physical Exam     Patient Vitals for the past 24 hrs:   BP Temp Temp src Pulse Resp SpO2 Height Weight   07/18/22 1034 114/58 98.2  F (36.8  C) Temporal 68 15 98 % 1.778 m (5' 10\") 83.9 kg (185 lb)     Physical Exam  General: Alert and cooperative with exam. Patient in mild distress. Anxious " appearance. Normal mentation.  Head:  Scalp is NC/AT  Eyes:  No scleral icterus, PERRL  ENT:  The external nose and ears are normal. The oropharynx is normal and without erythema; mucus membranes are moist. Uvula midline, no evidence of deep space infection.  Neck:  Normal range of motion without rigidity.  CV:  Regular rate and rhythm    No pathologic murmur   Resp:  Breath sounds are clear bilaterally    Non-labored, no retractions or accessory muscle use  GI:  Abdomen is soft, no distension. No peritoneal signs.  Mild diffuse abdominal tenderness on exam.   MS:  No lower extremity edema   Skin:  Warm and dry, No rash or lesions noted.  Neuro: Oriented x 3. No gross motor deficits.    Emergency Department Course     Laboratory:  Labs Ordered and Resulted from Time of ED Arrival to Time of ED Departure   COMPREHENSIVE METABOLIC PANEL - Abnormal       Result Value    Sodium 140      Potassium 4.5      Chloride 111 (*)     Carbon Dioxide (CO2) 25      Anion Gap 4      Urea Nitrogen 13      Creatinine 0.83      Calcium 8.9      Glucose 85      Alkaline Phosphatase 89      AST 29      ALT 24      Protein Total 7.5      Albumin 4.0      Bilirubin Total 0.8      GFR Estimate >90     RBC AND PLATELET MORPHOLOGY - Abnormal    Platelet Assessment Platelets Clumped (*)     RBC Morphology Confirmed RBC Indices     LIPASE - Normal    Lipase 84     CBC WITH PLATELETS AND DIFFERENTIAL    WBC Count 4.0      RBC Count 5.03      Hemoglobin 14.2      Hematocrit 41.9      MCV 83      MCH 28.2      MCHC 33.9      RDW 13.0      Platelet Count            Emergency Department Course:       Reviewed:  I reviewed nursing notes, vitals, past medical history and Care Everywhere    Assessments:  1134 I obtained history and examined the patient as noted above.   1451 I rechecked the patient and explained findings. He reports feeling much better after interventions.     Interventions:  1352 Pepcid 20 MG PO  1354 Gi Cocktail 30 mL  PO    Disposition:  The patient was discharged to home.     Impression & Plan     Medical Decision Making:  This is a well appearing 27 year old male with diffuse abdominal discomfort greatest in the epigastric area and vomiting now resolved diarrhea for the last 2 weeks.  The patient has a benign abdominal exam without focal RLQ or LLQ tenderness to suggest diverticulitis or appendicitis, respectively, or other acute abdominal process.  Repeat labs unremarkable.  Patient underwent unremarkable CT scan l~12 hours ago; no indication for repeat imaging at this time.  Symptoms are significantly improved after GI cocktail.  Tolerating oral intake.  Vital signs have remained normal and stable throughout the ED course. No emergent cause for patient's symptoms to be determined.  Recommended continues of Pepcid for possible gastritis/GERD and treatment of nausea with Zofran and Phenergan as needed.  Patient follow closely with PCP in 2 to 3 days for reevaluation.  If symptoms continue may benefit from GI consultation and outpatient H. pylori testing.       Diagnosis:    ICD-10-CM    1. Non-intractable vomiting with nausea, unspecified vomiting type  R11.2    2. Epigastric pain  R10.13      Discharge Medications:  New Prescriptions    PROMETHAZINE (PHENERGAN) 25 MG TABLET    Take 1 tablet (25 mg) by mouth every 6 hours as needed for nausea or vomiting     Scribe Disclosure:  Blossom MCNULTY, am serving as a scribe at 11:34 AM on 7/18/2022 to document services personally performed by Matt Quevedo DO based on my observations and the provider's statements to me.     Matt Quevedo DO  07/18/22 [id number]

## 2022-07-18 NOTE — ED NOTES
Discharge instructions gone over with pt, verbalized understanding. Discharged home with plan for follow up. Denies questions at time of discharge.

## 2022-07-18 NOTE — ED PROVIDER NOTES
"  History   Chief Complaint:  Nausea, Vomiting, & Diarrhea       The history is provided by the patient.      Jackie Medeiros is a 27 year old male with history of heartburn who presents with diarrhea and vomiting for the past 10 days. The patient claimed that he started experiencing diarrhea and vomiting on Friday 7/8/22 and mentioned that now it is only vomiting. He reported that sometimes there are very small specs of blood in the vomit, but no major bleeding. He noted that he has not been able to eat, mentioning that every time he eats he vomits a couple hours later. The patient reported that he has tried pepto bismal with no relief and that when he ate chips and brisket his symptoms worsened. He also complains of upper abdominal pain, worse with palpation.  No recent travel or antibiotic use.      Review of Systems   Constitutional: Negative for fever.   Gastrointestinal: Positive for abdominal pain, diarrhea (none currently) and vomiting.   All other systems reviewed and are negative.    Allergies:  Dust Mites  Minocycline    Medications:  Pantoprazole  Isotretinoin  Quetiapine    Past Medical History:     GERD  Environmental allergies  Vitamin D Deficiency     Social History:  The patient presents to the ED alone  PCP: Ju[facility]    Physical Exam     Patient Vitals for the past 24 hrs:   BP Temp Temp src Pulse Resp SpO2 Height Weight   07/17/22 2204 -- -- -- -- 16 99 % -- --   07/17/22 1904 (!) 112/6 98  F (36.7  C) Temporal 85 16 97 % 1.778 m (5' 10\") 85.3 kg (188 lb)       Physical Exam  General: Awake, alert, non-toxic.  Head:  Scalp is NC/AT  Eyes:  Conjunctiva normal, PERRL  ENT:  The external nose and ears are normal.   Neck:  Normal range of motion without rigidity.  CV:  Regular rate and rhythm    No pathologic murmur, rubs, or gallops.  Resp:  Breath sounds are clear bilaterally    Non-labored, no retractions or accessory muscle use  Abdomen: Abdomen is soft, no distension, Mild diffuse tenderness " upper > lower, Negative Severino sign.  no masses. No CVA tenderness.  MS:  No lower extremity edema or asymmetric calf swelling. No midline cervical, thoracic, or lumbar tenderness  Skin:  Warm and dry, No rash or lesions noted.  Neuro: Alert and oriented.  GCS 15 No gross motor deficits.  No facial asymmetry.  Psych:  Awake. Alert. Normal affect. Appropriate interactions.      Emergency Department Course       Imaging:  Abd/pelvis CT,  IV  contrast only TRAUMA / AAA   Final Result   IMPRESSION:    1.  Negative CT examination of the abdomen and pelvis.        Report per radiology    Laboratory:  Labs Ordered and Resulted from Time of ED Arrival to Time of ED Departure   COMPREHENSIVE METABOLIC PANEL - Normal       Result Value    Sodium 142      Potassium 4.3      Chloride 107      Carbon Dioxide (CO2) 29      Anion Gap 6      Urea Nitrogen 16      Creatinine 0.87      Calcium 9.0      Glucose 98      Alkaline Phosphatase 94      AST 19      ALT 24      Protein Total 7.9      Albumin 4.3      Bilirubin Total 0.6      GFR Estimate >90     LIPASE - Normal    Lipase 101     CBC WITH PLATELETS AND DIFFERENTIAL    WBC Count 8.8      RBC Count 5.36      Hemoglobin 14.8      Hematocrit 44.8      MCV 84      MCH 27.6      MCHC 33.0      RDW 12.8      Platelet Count 215      % Neutrophils 65      % Lymphocytes 25      % Monocytes 9      % Eosinophils 1      % Basophils 0      % Immature Granulocytes 0      NRBCs per 100 WBC 0      Absolute Neutrophils 5.7      Absolute Lymphocytes 2.2      Absolute Monocytes 0.8      Absolute Eosinophils 0.1      Absolute Basophils 0.0      Absolute Immature Granulocytes 0.0      Absolute NRBCs 0.0          Emergency Department Course:         Reviewed:  I reviewed nursing notes, past medical history and Care Everywhere    Assessments:  1853 I obtained history and examined the patient as noted above.   2047 I rechecked the patient and explained findings.     Interventions:  1910 Ondansetron  4mg PO   NS 1L IV    Famotidine injection 20 mg    Disposition:  The patient was discharged to home.     Impression & Plan     Medical Decision Makin-year-old male presents with vomiting diarrhea and diffuse abdominal pain ongoing for about 10 days.  Broad differential considered.  On exam he is well-appearing with stable vitals.  His work-up here is completely normal with normal CBC, CMP, and lipase.  CT scan shows no evidence of colitis bowel obstruction diverticulitis or other acute abnormality.  He has not had any fever blood recent hospitalization travel or antibiotic exposure however given duration of symptoms is reasonable to send him home with stool kits for testing.  He felt better after fluids and Zofran was able to tolerate p.o.  Likely acute viral illness of some kind versus possible gastritis.  We will trial Zofran and Pepcid for home.  Return precautions given for fever bloody stools increasing pain weakness or other concerns.      Diagnosis:    ICD-10-CM    1. Vomiting and diarrhea  R11.10 Enteric Bacteria and Virus Panel by [facility] Stool    R19.7 Ova and Parasite Exam Routine       Discharge Medications:  Discharge Medication List as of 2022  8:51 PM      START taking these medications    Details   famotidine (PEPCID) 20 MG tablet Take 1 tablet (20 mg) by mouth 2 times daily as needed, Disp-10 tablet, R-0, E-Prescribe      ondansetron (ZOFRAN ODT) 4 MG ODT tab Take 1 tablet (4 mg) by mouth every 6 hours as needed for nausea or vomiting, Disp-10 tablet, R-0, E-Prescribe             Scribe Disclosure:  HAMLET, Harpreet Acosta and Natalie North, am serving as a scribe at 7:17 PM on 2022 to document services personally performed by Jonathan Delgado PA-C based on my observations and the provider's statements to me.          Jonathan Delgado PA-C  22       Jonathan Delgado PA-C  228

## 2022-07-18 NOTE — DISCHARGE INSTRUCTIONS
Pepcid as needed for upset stomach  Tylenol as needed for additional pain relief  Phenergan or previously prescribed Zofran as needed for nausea

## 2022-07-18 NOTE — ED TRIAGE NOTES
Alert and Oriented to person, place, time and situation.    Airway patent.    Respirations are regular and unlabored.  Patient talking in full sentences.  Patient denies cough or shortness of breath.    Pulses are strong and regular with palpation.  Skin is normal color, warm and dry.   Cap refill is less than 3 seconds.  Patient denies chest pain/pressure.    Patient reports here for same last night. States treatment yesterday did not help. States need antibiotics.2 weeks of N/V. 10 minutes after ingesting anything patient reports vomiting.

## 2022-07-19 ENCOUNTER — PATIENT OUTREACH (OUTPATIENT)
Dept: CARE COORDINATION | Facility: CLINIC | Age: 27
End: 2022-07-19

## 2022-07-19 DIAGNOSIS — Z71.89 OTHER SPECIFIED COUNSELING: ICD-10-CM

## 2022-07-19 NOTE — PROGRESS NOTES
Clinic Care Coordination Contact  [facility]: Post-Discharge Note  SITUATION                                                      Admission:    Admission Date: 07/18/22   Reason for Admission: Vomiting  Discharge:   Discharge Date: 07/18/22  Discharge Diagnosis: Vomiting    BACKGROUND                                                      Per hospital discharge summary and inpatient provider notes:    Jackie Medeiros is a 27 year old male with history of GERD who presents with diarrhea and vomiting for the past two weeks (07/08/2022). [facility] the [facility] as [facility] presents with today; he was discharged and prescribed famotidine and ondansetron. Jackie claims that he started experiencing diarrhea and vomiting after consuming brisket, spring rolls, and chips.  He emphasized that he has not been able to eat or drink- including water, mentioning that every time he eats or drinks he vomits immediately after. Jackie shares that he has not had relief from famotidine or ondansetron. He also complains of generalized abdominal pain that is tender to palpation. Denies blood in stool, chest pain, and shortness of breath. Denies drug and alcohol use. Jackie reports that his symptoms are similar to when he had H. Pylori (2013).  Diarrhea has been resolved for the last several days.    ASSESSMENT      Enrollment  Primary Care Care Coordination Status: Declined    Discharge Assessment  How are you doing now that you are home?: I think I am doing much better than yesterday. I still cannot eat meat or spicy foods.  How are your symptoms? (Red Flag symptoms escalate to triage hotline per guidelines): Improved  Do you feel your condition is stable enough to be safe at home until your provider visit?: Yes  Does the patient have their discharge instructions? : Yes  Does the patient have questions regarding their discharge instructions? : No  Were you started on any new  medications or were there changes to any of your previous medications? : Yes  Does the patient have all of their medications?: Yes  Do you have questions regarding any of your medications? : No  Discharge follow-up appointment scheduled within 14 calendar days? : Yes  Discharge Follow Up Appointment Date: 07/26/22  Discharge Follow Up Appointment Scheduled with?: Primary Care Provider                  PLAN                                                      Outpatient Plan: Follow up with Yves Moscoso MD in 3 days  (around 7/21/2022)    Future Appointments   Date Time Provider Department Center   7/26/2022  9:00 AM Martha Yousif MD [facility]         For any urgent concerns, please contact our 24 hour nurse triage line: [phone] ([phone])         SAURABH Chanel  [phone]  [facility]

## 2022-07-26 ENCOUNTER — OFFICE VISIT (OUTPATIENT)
Dept: FAMILY MEDICINE | Facility: CLINIC | Age: 27
End: 2022-07-26
Payer: COMMERCIAL

## 2022-07-26 VITALS
HEART RATE: 68 BPM | SYSTOLIC BLOOD PRESSURE: 111 MMHG | TEMPERATURE: 97.8 F | WEIGHT: 183 LBS | BODY MASS INDEX: 26.2 KG/M2 | OXYGEN SATURATION: 99 % | HEIGHT: 70 IN | RESPIRATION RATE: 16 BRPM | DIASTOLIC BLOOD PRESSURE: 75 MMHG

## 2022-07-26 DIAGNOSIS — K21.9 GASTROESOPHAGEAL REFLUX DISEASE WITHOUT ESOPHAGITIS: Primary | ICD-10-CM

## 2022-07-26 DIAGNOSIS — G47.00 INSOMNIA, UNSPECIFIED TYPE: ICD-10-CM

## 2022-07-26 PROCEDURE — 99214 OFFICE O/P EST MOD 30 MIN: CPT | Performed by: INTERNAL MEDICINE

## 2022-07-26 RX ORDER — PANTOPRAZOLE SODIUM 40 MG/1
40 TABLET, DELAYED RELEASE ORAL DAILY
Qty: 30 TABLET | Refills: 0 | Status: SHIPPED | OUTPATIENT
Start: 2022-07-26 | End: 2022-08-23

## 2022-07-26 NOTE — PROGRESS NOTES
Assessment & Plan     Gastroesophageal reflux disease without esophagitis  He can discontinue famotidine and phenergan   Start protonix x 30 days  Dietary  Modifications discussed (avoiding fatty/spicy foods, avoid etoh, avoid nsaids)  Recommend schedule with GI for eval given ongoing intermittent symptoms  - pantoprazole (PROTONIX) 40 MG EC tablet  Dispense: 30 tablet; Refill: 0  - Adult GI  Referral - Consult Only    Insomnia, unspecified type  Refer to sleep psychology  - Adult Mental Health  Referral    Return if symptoms worsen or fail to improve.    Samantha Wilhelm DO  [facility] EFREN Mejia is a 27 year old, presenting for the following health issues:  ER F/U      HPI     Post Discharge Outreach 7/19/2022   Admission Date 7/18/2022   Reason for Admission Vomiting   Discharge Date 7/18/2022   Discharge Diagnosis Vomiting   How are you doing now that you are home? I think I am doing much better than yesterday. I still cannot eat meat or spicy foods.   How are your symptoms? (Red Flag symptoms escalate to triage hotline per guidelines) Improved   Do you feel your condition is stable enough to be safe at home until your provider visit? Yes   Does the patient have their discharge instructions?  Yes   Does the patient have questions regarding their discharge instructions?  No   Were you started on any new medications or were there changes to any of your previous medications?  Yes   Does the patient have all of their medications? Yes   Do you have questions regarding any of your medications?  No   Discharge follow-up appointment scheduled within 14 calendar days?  Yes   Discharge Follow Up Appointment Date 7/26/2022   Discharge Follow Up Appointment Scheduled with? Primary Care Provider       ED/UC Followup:    Facility:  [facility] Emergency Department  Date of visit: 07/18/22  Reason for visit: Vomiting  Current Status: Symptoms got better for a  "day or two but has gone back to the same as before    Jackie presents for follow-up due to n/v/abd pain. States improved for a few days but starting to come back. \"Those meds they gave me made me worse\" in regards to pepcid/phenergan. I did review he has tried PPI in the past and he would like to trial that instead. He has not seen GI. States continues to have fear of eating food due to discomfort, states has intermittent nausea. Better than in ER but not resolved.     He also reports he has had difficulty sleeping for years. States no anxiety/depression. States feeling good otherwise but sometimes it's harder to sleep.    Review of Systems   Constitutional, HEENT, cardiovascular, pulmonary, gi and gu systems are negative, except as otherwise noted.      Objective    /75 (BP Location: Right arm, Patient Position: Sitting, Cuff Size: Adult Regular)   Pulse 68   Temp 97.8  F (36.6  C)   Resp 16   Ht 1.778 m (5' 10\")   Wt 83 kg (183 lb)   SpO2 99%   BMI 26.26 kg/m    Body mass index is 26.26 kg/m .  Physical Exam     GENERAL APPEARANCE: AAOx3, no distress. Well developed.    PSYCH: appropriate mood and affect.             .  ..  "

## 2022-08-22 DIAGNOSIS — K21.9 GASTROESOPHAGEAL REFLUX DISEASE WITHOUT ESOPHAGITIS: ICD-10-CM

## 2022-08-23 RX ORDER — PANTOPRAZOLE SODIUM 40 MG/1
40 TABLET, DELAYED RELEASE ORAL DAILY
Qty: 30 TABLET | Refills: 0 | Status: SHIPPED | OUTPATIENT
Start: 2022-08-23 | End: 2022-09-14

## 2022-08-24 ENCOUNTER — LAB (OUTPATIENT)
Dept: LAB | Facility: CLINIC | Age: 27
End: 2022-08-24
Payer: COMMERCIAL

## 2022-08-24 ENCOUNTER — OFFICE VISIT (OUTPATIENT)
Dept: GASTROENTEROLOGY | Facility: CLINIC | Age: 27
End: 2022-08-24
Payer: COMMERCIAL

## 2022-08-24 VITALS
WEIGHT: 180.9 LBS | DIASTOLIC BLOOD PRESSURE: 76 MMHG | HEART RATE: 77 BPM | SYSTOLIC BLOOD PRESSURE: 117 MMHG | BODY MASS INDEX: 25.9 KG/M2 | OXYGEN SATURATION: 97 % | HEIGHT: 70 IN

## 2022-08-24 DIAGNOSIS — K21.9 GASTROESOPHAGEAL REFLUX DISEASE WITHOUT ESOPHAGITIS: Primary | ICD-10-CM

## 2022-08-24 DIAGNOSIS — R11.10 VOMITING AND DIARRHEA: ICD-10-CM

## 2022-08-24 DIAGNOSIS — Z86.19 HISTORY OF HELICOBACTER PYLORI INFECTION: ICD-10-CM

## 2022-08-24 DIAGNOSIS — R19.7 VOMITING AND DIARRHEA: ICD-10-CM

## 2022-08-24 PROCEDURE — 99205 OFFICE O/P NEW HI 60 MIN: CPT | Performed by: INTERNAL MEDICINE

## 2022-08-24 RX ORDER — PANTOPRAZOLE SODIUM 40 MG/1
40 TABLET, DELAYED RELEASE ORAL DAILY
Qty: 90 TABLET | Refills: 0 | Status: SHIPPED | OUTPATIENT
Start: 2022-08-24 | End: 2024-04-26

## 2022-08-24 ASSESSMENT — PAIN SCALES - GENERAL: PAINLEVEL: NO PAIN (1)

## 2022-08-24 NOTE — LETTER
8/24/2022         RE: Jackie Medeiros  [address] [place] [address]        Dear Colleague,    Thank you for referring your patient, Jackie Medeiros, to the [facility] [place]. Please see a copy of my visit note below.    GI CLINIC VISIT    CC/REFERRING MD:  Samantha Wilhelm  REASON FOR CONSULTATION:   Mr. Medeiros is a 27 year old male who I was asked to see in consultation at the request of Dr. Samantha Wilhelm for postprandial nausea and emesis.       ASSESSMENT/PLAN:  (K21.9) Gastroesophageal reflux disease without esophagitis  (primary encounter diagnosis)  (Z86.19) History of Helicobacter pylori infection  (R11.10,  R19.7) Vomiting and diarrhea     Urgent referral for postprandial nausea and emesis which developed after acute GI illness (n/v/d and abdominal pain) on a background of GERD and remote hx of H pylori.      Suspect acute onset of n/v/d/abd pain on 7/8/2022 was infectious in nature (?viral gastroenteritis) vs food toxin-mediated. No infectious stool studies were undertaken at that time. Less likely presentation for recent COVID variants, though some earlier variants presenting with similar picture. Symptoms markedly better within a couple days, with some lingering looser stools (though only 1-2 times daily) and postprandial emesis (but no in-between meal symptoms). Additionally may have a component of post-infectious delayed gastric emptying versus needing time to return to prior GI equilibrium post-infection. Regardless symptoms continued to improve over a few weeks and completely resolved a few weeks ago (after about a month of symptoms). Patient is eating regularly with return to baseline stool pattern and gaining back some of the previously lost weight.       Noted hx of H pylori - unclear if proven eradication. Currently off PPI, will send stool Ag now. This certainly can lead to a variety of GI symptoms, but less likely  of acute symptoms in July.       Clinically he is experiencing GERD with classic postprandial reflux symptoms (as per today's description). He has not really instituted much in the way of dietary or lifestyle changes. We reviewed some of these today and provided written documents as well. We discussed the mechanism of action, role of PPI, and rebound reflux with starting and stopping the medication as well as recommendation for daily use for a period of time (aim for 8 weeks). If improved certainly can stop at that time. No issues with overt Gi bleeding, dysphagia, odynophagia. We did discuss these symptoms and consideration of EGD if they develop down-the-road.     Questions answered to the patient's satisfaction and understanding was confirmed at the end of our conversation    - stop at the lab today for a stool collection kit  - turn in a stool sample to the nearest [facility] lab  - after turning in the stool sample you can start taking pantoprazole daily, take it on an empty stomach 30 minutes before breakfast  - avoid GERD-triggering foods (see attached information)  - eat dinner right when you get home from work, wait 3-4 hours after eating before laying down to go to bed at night  - avoid over-filling your stomach at meals  - consider eating smaller meals/snacks throughout the day  - follow-up in GI clinic as needed  - follow-up with your primary care doctor, particularly to talk about your sleep       Thank you for this consultation. It was a pleasure to participate in the care of this patient; please contact us with any further questions.  A total of 45 face-to-face minutes was spent with this patient, >50% of which was counseling regarding the above delineated issues. An additional 25 minutes was spent on the date of the encounter doing chart review, documentation, care coordination and further activities as noted above.    Barbara Richard MD   of Medicine  Division of Gastroenterology, Hepatology and  "Nutrition  [facility]    ---------------------------------------------------------------------------------------------------  HPI:    Mr. Medeiros is a 27 year old male with environmental allergies, vitamin D deficiency, and GERD who presents to GI for evaluation of abdominal pain,nausea, and diarrhea.    His symptoms began suddenly on July 8th with the acute onset of vomiting, diarrhea, and abdominal pain. He wonders if this was \"food poisoning\". From July 8-9th had 3 loose to liquid stools (baseline stool pattern is twice daily, formed).  He feels that for 10-12 days he stooled 1-2 times a day, but the stools was looser than typical, though he had some normal stool during that time as well. After 12 days, bowel movements were back to normal. Abdominal pain began on 7/8/22. It was periumbilical and radiated outward, covering his central abdomen. This was characterized as achy. It was intermittent, maybe worse after eating. Pain resolved after a few days.  From July 8-9th, Mr. Medeiros felt that he had vomiting every 4 hours for 18 hours or so. Thereafter he had no nausea or vomiting, unless he ate. Generally he has just black tea in the morning, then has lunch around 1-2 pm and dinner around 9-10 pm and goes to bed around 12 a.m. About 2-3 hours after eating nausea begins, then he has retching, then vomits and it seems that he vomits and sees some of the food he ate, mostly meats. He did feel that white rice with buttermilk this stayed down often without issue. He did lose weight during this time going from 190 lbs down to 176 lbs.This lasted until early August. Mr. Medeiros cut back on meats and spicy foods and ate rice-based meals and started to feel better. He thinks that all symptoms resolved by August 12th. Thereafter he started re-introducing his regular diet and he's done well for the last two weeks with no GI symptoms. He is eating his baseline diet, gaining weight (back up to 181 lbs), and having " normal BMs.     For work-up he was seen in the ED on two occasions as well as in primary care clinic.   After 10 days of symptoms he presented to the ED on 7/17/2022. Work-up there revealed a normal CMP, lipase, and CBC. CT of the abd/pelvis was unremarkable. He was treated with ondansetron and IVF and discharged to home with ondansetron and famotidine. He returned the following day to the ED reporting that after eating brisket, spring rolls, and chips, he felt much worse. He was given famotidine and GI cocktail and reportedly felt much better after the GI cocktail. Today he recalls that the cocktailHe was discharged to home with recommendation to be seen in primary care clinic and undergo H pylori stool testing.     He was seen in primary care clinic on 7/26/2022 and reported his symptoms were better, but not yet resolved.  He was prescribed a PPI. No H pylori testing was ordered. He was referred to GI clinic at that time. He states he takes the pantoprazole about once a week using it when he is having a bigger meal in an effort to avoid heartburn. His last dose was at the beginning of the month (3-4 weeks ago).     In regards to his GI history, he started having issues with some flour-based foods, spicy foods about 9 years ago. After eating these items he experienced burning in the substernal and epigastric region as well as a sense of liquid up to the level of his throat as well as nausea. He was tested for H pylori and was treated for it. He believes he was checked for eradication via stool test. There are several blood serologies in our system but I could find no stool test result. He feels treating H pylori improved his nausea, but not the other symptoms. He reports that he's been told this may be due to issues with acid reflux. He initially states his never had treatment specifically for the burning and sense of liquid in the throat.Though later in our interview he recalls using omeprazole once daily for a  period of time and found this helpful.      Today, he does feel that when he eats spicy food, he has substernal burning an hour later. He has no current dysphagia, odynophagia, melena. As mentioned above, he is back to his baseline diet and gaining back weight.        PROBLEM LIST  Patient Active Problem List    Diagnosis Date Noted     Vitamin D deficiency 01/24/2014     Priority: Medium     vitamin D level 23       Anemia 08/06/2013     Priority: Medium     Hyperlipidemia LDL goal <130 02/01/2013     Priority: Medium     Environmental allergies 01/06/2013     Priority: Medium     Abnormal laboratory test 01/01/2012     Priority: Medium     GERD (gastroesophageal reflux disease) 06/27/2010     Priority: Medium       PERTINENT PAST MEDICAL HISTORY:  Past Medical History:   Diagnosis Date     Abnormal laboratory test 1/1/2012     Allergic state      Anemia 8/6/2013     Environmental allergies 1/6/2013     Vitamin D deficiency 1/24/14    vitamin D level 23       PREVIOUS SURGERIES:  None    ALLERGIES:     Allergies   Allergen Reactions     Dust Mites      Minocycline Hives       PERTINENT MEDICATIONS:  Current Outpatient Medications   Medication     amoxicillin (AMOXIL) 500 MG tablet     benzoyl peroxide 5 % external liquid     cetirizine (ZYRTEC) 10 MG tablet     Cholecalciferol (VITAMIN D-3) 125 MCG (5000 UT) TABS     clindamycin (CLINDAMAX) 1 % external gel     ibuprofen (ADVIL/MOTRIN) 800 MG tablet     ISOtretinoin (ACCUTANE) 40 MG capsule     olopatadine (PATANOL) 0.1 % ophthalmic solution     pantoprazole (PROTONIX) 40 MG EC tablet     QUEtiapine (SEROQUEL) 25 MG tablet     tretinoin (RETIN-A) 0.025 % external cream     triamcinolone (KENALOG) 0.025 % cream     No current facility-administered medications for this visit.       SOCIAL HISTORY:  Social History     Socioeconomic History     Marital status: Single     Spouse name: Not on file     Number of children: Not on file     Years of education: Not on file      Highest education level: Not on file   Occupational History     Not on file   Tobacco Use     Smoking status: Never Smoker     Smokeless tobacco: Never Used   Substance and Sexual Activity     Alcohol use: No     Drug use: No     Sexual activity: Yes     Partners: Female   Other Topics Concern     Parent/sibling w/ CABG, MI or angioplasty before 65F 55M? Not Asked   Social History Narrative    Brother:    7 yrs older, in [place]    11 yrs younger        Sisters    5 yrs older    12 yrs older        Came to [place] In [place] from [place] via [place].        He is 1 yr younger.                 Social Determinants of Health     Financial Resource Strain: Not on file   Food Insecurity: Not on file   Transportation Needs: Not on file   Physical Activity: Not on file   Stress: Not on file   Social Connections: Not on file   Intimate Partner Violence: Not on file   Housing Stability: Not on file       FAMILY HISTORY:  Family History   Problem Relation Age of Onset     Family History Negative No family hx of        Mother: GERD      PHYSICAL EXAMINATION:  Vitals There were no vitals taken for this visit.   Wt   Wt Readings from Last 2 Encounters:   07/26/22 83 kg (183 lb)   07/18/22 83.9 kg (185 lb)      Gen: Pt sitting up in NAD, interactive and cooperative on exam  Eyes: sclerae anicteric, no injection  Cardiac: RRR, nl S1, S2, no peripheral edema  Resp: Clear bilaterally   GI: Normoactive BS, abd soft, nontender  Skin: Warm, dry, no jaundice, nails appear healthy  Neuro: alert, oriented, answers questions appropriately      PERTINENT STUDIES:    Office Visit on 09/08/2021   Component Date Value Ref Range Status     Color Urine 09/08/2021 Yellow  Colorless, Straw, Light Yellow, Yellow Final     Appearance Urine 09/08/2021 Clear  Clear Final     Glucose Urine 09/08/2021 Negative  Negative mg/dL Final     Bilirubin Urine 09/08/2021 Negative  Negative Final     Ketones Urine 09/08/2021 Negative  Negative mg/dL Final     Specific  Gravity Urine 09/08/2021 1.020  1.003 - 1.035 Final     Blood Urine 09/08/2021 Negative  Negative Final     pH Urine 09/08/2021 6.0  5.0 - 7.0 Final     Protein Albumin Urine 09/08/2021 Negative  Negative mg/dL Final     Urobilinogen Urine 09/08/2021 0.2  0.2, 1.0 E.U./dL Final     Nitrite Urine 09/08/2021 Negative  Negative Final     Leukocyte Esterase Urine 09/08/2021 Negative  Negative Final     HIV Antigen Antibody Combo 09/08/2021 Nonreactive  Nonreactive Final     Treponema Antibody Total 09/08/2021 Nonreactive  Nonreactive Final     Hepatitis C Antibody 09/08/2021 Nonreactive  Nonreactive Final     Hepatitis B Surface Antigen 09/08/2021 Nonreactive  Nonreactive Final     Sodium 09/08/2021 135  133 - 144 mmol/L Final     Potassium 09/08/2021 4.2  3.4 - 5.3 mmol/L Final     Chloride 09/08/2021 104  94 - 109 mmol/L Final     Carbon Dioxide (CO2) 09/08/2021 32  20 - 32 mmol/L Final     Anion Gap 09/08/2021 <1 (A) 3 - 14 mmol/L Final     Urea Nitrogen 09/08/2021 11  7 - 30 mg/dL Final     Creatinine 09/08/2021 0.86  0.66 - 1.25 mg/dL Final     Calcium 09/08/2021 8.8  8.5 - 10.1 mg/dL Final     Glucose 09/08/2021 92  70 - 99 mg/dL Final     GFR Estimate 09/08/2021 >90  >60 mL/min/1.73m2 Final     Neisseria gonorrhoeae 09/08/2021 Negative  Negative Final     Chlamydia trachomatis 09/08/2021 Negative  Negative Final             Sincerely,    Barbara Richard MD

## 2022-08-24 NOTE — NURSING NOTE
"Chief Complaint   Patient presents with     New Patient       Vitals:    08/24/22 1007   BP: 117/76   Pulse: 77   SpO2: 97%   Weight: 82.1 kg (180 lb 14.4 oz)   Height: 1.778 m (5' 10\")       Body mass index is 25.96 kg/m .    Francia Crane MA    "

## 2022-08-24 NOTE — PATIENT INSTRUCTIONS
- stop at the lab today for a stool collection kit  - turn in a stool sample to the nearest [facility] lab  - after turning in the stool sample you can start taking pantoprazole daily, take it on an empty stomach 30 minutes before breakfast  - avoid GERD-triggering foods (see attached information)  - eat dinner right when you get home from work, wait 3-4 hours after eating before laying down to go to bed at night  - avoid over-filling your stomach at meals  - consider eating smaller meals/snacks throughout the day  - follow-up in GI clinic as needed  - follow-up with your primary care doctor, particularly to talk about your sleep      If you have any questions, please don't hesitate to contact me through our GI RN Clinic Coordinator, Palak Vogel, at [phone].

## 2022-08-24 NOTE — PROGRESS NOTES
GI CLINIC VISIT    CC/REFERRING MD:  Samantha Wilhelm  REASON FOR CONSULTATION:   Mr. Medeiros is a 27 year old male who I was asked to see in consultation at the request of Dr. Samantha Wilhelm for postprandial nausea and emesis.       ASSESSMENT/PLAN:  (K21.9) Gastroesophageal reflux disease without esophagitis  (primary encounter diagnosis)  (Z86.19) History of Helicobacter pylori infection  (R11.10,  R19.7) Vomiting and diarrhea     Urgent referral for postprandial nausea and emesis which developed after acute GI illness (n/v/d and abdominal pain) on a background of GERD and remote hx of H pylori.      Suspect acute onset of n/v/d/abd pain on 7/8/2022 was infectious in nature (?viral gastroenteritis) vs food toxin-mediated. No infectious stool studies were undertaken at that time. Less likely presentation for recent COVID variants, though some earlier variants presenting with similar picture. Symptoms markedly better within a couple days, with some lingering looser stools (though only 1-2 times daily) and postprandial emesis (but no in-between meal symptoms). Additionally may have a component of post-infectious delayed gastric emptying versus needing time to return to prior GI equilibrium post-infection. Regardless symptoms continued to improve over a few weeks and completely resolved a few weeks ago (after about a month of symptoms). Patient is eating regularly with return to baseline stool pattern and gaining back some of the previously lost weight.       Noted hx of H pylori - unclear if proven eradication. Currently off PPI, will send stool Ag now. This certainly can lead to a variety of GI symptoms, but less likely  of acute symptoms in July.      Clinically he is experiencing GERD with classic postprandial reflux symptoms (as per today's description). He has not really instituted much in the way of dietary or lifestyle changes. We reviewed some of these today and provided written documents as well. We  discussed the mechanism of action, role of PPI, and rebound reflux with starting and stopping the medication as well as recommendation for daily use for a period of time (aim for 8 weeks). If improved certainly can stop at that time. No issues with overt Gi bleeding, dysphagia, odynophagia. We did discuss these symptoms and consideration of EGD if they develop down-the-road.     Questions answered to the patient's satisfaction and understanding was confirmed at the end of our conversation    - stop at the lab today for a stool collection kit  - turn in a stool sample to the nearest [facility] lab  - after turning in the stool sample you can start taking pantoprazole daily, take it on an empty stomach 30 minutes before breakfast  - avoid GERD-triggering foods (see attached information)  - eat dinner right when you get home from work, wait 3-4 hours after eating before laying down to go to bed at night  - avoid over-filling your stomach at meals  - consider eating smaller meals/snacks throughout the day  - follow-up in GI clinic as needed  - follow-up with your primary care doctor, particularly to talk about your sleep       Thank you for this consultation. It was a pleasure to participate in the care of this patient; please contact us with any further questions.  A total of 45 face-to-face minutes was spent with this patient, >50% of which was counseling regarding the above delineated issues. An additional 25 minutes was spent on the date of the encounter doing chart review, documentation, care coordination and further activities as noted above.    Barbara Richard MD   of Medicine  Division of Gastroenterology, Hepatology and Nutrition  [facility]    ---------------------------------------------------------------------------------------------------  HPI:    Mr. Medeiros is a 27 year old male with environmental allergies, vitamin D deficiency, and GERD who presents to GI for evaluation  "of abdominal pain,nausea, and diarrhea.    His symptoms began suddenly on July 8th with the acute onset of vomiting, diarrhea, and abdominal pain. He wonders if this was \"food poisoning\". From July 8-9th had 3 loose to liquid stools (baseline stool pattern is twice daily, formed).  He feels that for 10-12 days he stooled 1-2 times a day, but the stools was looser than typical, though he had some normal stool during that time as well. After 12 days, bowel movements were back to normal. Abdominal pain began on 7/8/22. It was periumbilical and radiated outward, covering his central abdomen. This was characterized as achy. It was intermittent, maybe worse after eating. Pain resolved after a few days.  From July 8-9th, Mr. Medeiros felt that he had vomiting every 4 hours for 18 hours or so. Thereafter he had no nausea or vomiting, unless he ate. Generally he has just black tea in the morning, then has lunch around 1-2 pm and dinner around 9-10 pm and goes to bed around 12 a.m. About 2-3 hours after eating nausea begins, then he has retching, then vomits and it seems that he vomits and sees some of the food he ate, mostly meats. He did feel that white rice with buttermilk this stayed down often without issue. He did lose weight during this time going from 190 lbs down to 176 lbs.This lasted until early August. Mr. Medeiros cut back on meats and spicy foods and ate rice-based meals and started to feel better. He thinks that all symptoms resolved by August 12th. Thereafter he started re-introducing his regular diet and he's done well for the last two weeks with no GI symptoms. He is eating his baseline diet, gaining weight (back up to 181 lbs), and having normal BMs.     For work-up he was seen in the ED on two occasions as well as in primary care clinic.   After 10 days of symptoms he presented to the ED on 7/17/2022. Work-up there revealed a normal CMP, lipase, and CBC. CT of the abd/pelvis was unremarkable. He was treated " with ondansetron and IVF and discharged to home with ondansetron and famotidine. He returned the following day to the ED reporting that after eating brisket, spring rolls, and chips, he felt much worse. He was given famotidine and GI cocktail and reportedly felt much better after the GI cocktail. Today he recalls that the cocktailHe was discharged to home with recommendation to be seen in primary care clinic and undergo H pylori stool testing.     He was seen in primary care clinic on 7/26/2022 and reported his symptoms were better, but not yet resolved.  He was prescribed a PPI. No H pylori testing was ordered. He was referred to GI clinic at that time. He states he takes the pantoprazole about once a week using it when he is having a bigger meal in an effort to avoid heartburn. His last dose was at the beginning of the month (3-4 weeks ago).     In regards to his GI history, he started having issues with some flour-based foods, spicy foods about 9 years ago. After eating these items he experienced burning in the substernal and epigastric region as well as a sense of liquid up to the level of his throat as well as nausea. He was tested for H pylori and was treated for it. He believes he was checked for eradication via stool test. There are several blood serologies in our system but I could find no stool test result. He feels treating H pylori improved his nausea, but not the other symptoms. He reports that he's been told this may be due to issues with acid reflux. He initially states his never had treatment specifically for the burning and sense of liquid in the throat.Though later in our interview he recalls using omeprazole once daily for a period of time and found this helpful.      Today, he does feel that when he eats spicy food, he has substernal burning an hour later. He has no current dysphagia, odynophagia, melena. As mentioned above, he is back to his baseline diet and gaining back weight.        PROBLEM  LIST  Patient Active Problem List    Diagnosis Date Noted     Vitamin D deficiency 01/24/2014     Priority: Medium     vitamin D level 23       Anemia 08/06/2013     Priority: Medium     Hyperlipidemia LDL goal <130 02/01/2013     Priority: Medium     Environmental allergies 01/06/2013     Priority: Medium     Abnormal laboratory test 01/01/2012     Priority: Medium     GERD (gastroesophageal reflux disease) 06/27/2010     Priority: Medium       PERTINENT PAST MEDICAL HISTORY:  Past Medical History:   Diagnosis Date     Abnormal laboratory test 1/1/2012     Allergic state      Anemia 8/6/2013     Environmental allergies 1/6/2013     Vitamin D deficiency 1/24/14    vitamin D level 23       PREVIOUS SURGERIES:  None    ALLERGIES:     Allergies   Allergen Reactions     Dust Mites      Minocycline Hives       PERTINENT MEDICATIONS:  Current Outpatient Medications   Medication     amoxicillin (AMOXIL) 500 MG tablet     benzoyl peroxide 5 % external liquid     cetirizine (ZYRTEC) 10 MG tablet     Cholecalciferol (VITAMIN D-3) 125 MCG (5000 UT) TABS     clindamycin (CLINDAMAX) 1 % external gel     ibuprofen (ADVIL/MOTRIN) 800 MG tablet     ISOtretinoin (ACCUTANE) 40 MG capsule     olopatadine (PATANOL) 0.1 % ophthalmic solution     pantoprazole (PROTONIX) 40 MG EC tablet     QUEtiapine (SEROQUEL) 25 MG tablet     tretinoin (RETIN-A) 0.025 % external cream     triamcinolone (KENALOG) 0.025 % cream     No current facility-administered medications for this visit.       SOCIAL HISTORY:  Social History     Socioeconomic History     Marital status: Single     Spouse name: Not on file     Number of children: Not on file     Years of education: Not on file     Highest education level: Not on file   Occupational History     Not on file   Tobacco Use     Smoking status: Never Smoker     Smokeless tobacco: Never Used   Substance and Sexual Activity     Alcohol use: No     Drug use: No     Sexual activity: Yes     Partners: Female    Other Topics Concern     Parent/sibling w/ CABG, MI or angioplasty before 65F 55M? Not Asked   Social History Narrative    Brother:    7 yrs older, in [place]    11 yrs younger        Sisters    5 yrs older    12 yrs older        Came to [place] In [place] from [place] via [place].        He is 1 yr younger.                 Social Determinants of Health     Financial Resource Strain: Not on file   Food Insecurity: Not on file   Transportation Needs: Not on file   Physical Activity: Not on file   Stress: Not on file   Social Connections: Not on file   Intimate Partner Violence: Not on file   Housing Stability: Not on file       FAMILY HISTORY:  Family History   Problem Relation Age of Onset     Family History Negative No family hx of        Mother: GERD      PHYSICAL EXAMINATION:  Vitals There were no vitals taken for this visit.   Wt   Wt Readings from Last 2 Encounters:   07/26/22 83 kg (183 lb)   07/18/22 83.9 kg (185 lb)      Gen: Pt sitting up in NAD, interactive and cooperative on exam  Eyes: sclerae anicteric, no injection  Cardiac: RRR, nl S1, S2, no peripheral edema  Resp: Clear bilaterally   GI: Normoactive BS, abd soft, nontender  Skin: Warm, dry, no jaundice, nails appear healthy  Neuro: alert, oriented, answers questions appropriately      PERTINENT STUDIES:    Office Visit on 09/08/2021   Component Date Value Ref Range Status     Color Urine 09/08/2021 Yellow  Colorless, Straw, Light Yellow, Yellow Final     Appearance Urine 09/08/2021 Clear  Clear Final     Glucose Urine 09/08/2021 Negative  Negative mg/dL Final     Bilirubin Urine 09/08/2021 Negative  Negative Final     Ketones Urine 09/08/2021 Negative  Negative mg/dL Final     Specific Gravity Urine 09/08/2021 1.020  1.003 - 1.035 Final     Blood Urine 09/08/2021 Negative  Negative Final     pH Urine 09/08/2021 6.0  5.0 - 7.0 Final     Protein Albumin Urine 09/08/2021 Negative  Negative mg/dL Final     Urobilinogen Urine 09/08/2021 0.2  0.2, 1.0 E.U./dL  Final     Nitrite Urine 09/08/2021 Negative  Negative Final     Leukocyte Esterase Urine 09/08/2021 Negative  Negative Final     HIV Antigen Antibody Combo 09/08/2021 Nonreactive  Nonreactive Final     Treponema Antibody Total 09/08/2021 Nonreactive  Nonreactive Final     Hepatitis C Antibody 09/08/2021 Nonreactive  Nonreactive Final     Hepatitis B Surface Antigen 09/08/2021 Nonreactive  Nonreactive Final     Sodium 09/08/2021 135  133 - 144 mmol/L Final     Potassium 09/08/2021 4.2  3.4 - 5.3 mmol/L Final     Chloride 09/08/2021 104  94 - 109 mmol/L Final     Carbon Dioxide (CO2) 09/08/2021 32  20 - 32 mmol/L Final     Anion Gap 09/08/2021 <1 (A) 3 - 14 mmol/L Final     Urea Nitrogen 09/08/2021 11  7 - 30 mg/dL Final     Creatinine 09/08/2021 0.86  0.66 - 1.25 mg/dL Final     Calcium 09/08/2021 8.8  8.5 - 10.1 mg/dL Final     Glucose 09/08/2021 92  70 - 99 mg/dL Final     GFR Estimate 09/08/2021 >90  >60 mL/min/1.73m2 Final     Neisseria gonorrhoeae 09/08/2021 Negative  Negative Final     Chlamydia trachomatis 09/08/2021 Negative  Negative Final

## 2022-08-26 PROCEDURE — 87338 HPYLORI STOOL AG IA: CPT | Mod: 90 | Performed by: PATHOLOGY

## 2022-08-26 PROCEDURE — 99000 SPECIMEN HANDLING OFFICE-LAB: CPT | Performed by: PATHOLOGY

## 2022-08-29 LAB — H PYLORI AG STL QL IA: POSITIVE

## 2022-08-31 DIAGNOSIS — A04.8 H. PYLORI INFECTION: Primary | ICD-10-CM

## 2022-09-14 ENCOUNTER — OFFICE VISIT (OUTPATIENT)
Dept: PHARMACY | Facility: CLINIC | Age: 27
End: 2022-09-14
Payer: COMMERCIAL

## 2022-09-14 VITALS — BODY MASS INDEX: 26.96 KG/M2 | SYSTOLIC BLOOD PRESSURE: 115 MMHG | WEIGHT: 187.9 LBS | DIASTOLIC BLOOD PRESSURE: 80 MMHG

## 2022-09-14 DIAGNOSIS — Z91.09 ENVIRONMENTAL ALLERGIES: ICD-10-CM

## 2022-09-14 DIAGNOSIS — G47.00 INSOMNIA, UNSPECIFIED TYPE: ICD-10-CM

## 2022-09-14 DIAGNOSIS — A04.8 H. PYLORI INFECTION: Primary | ICD-10-CM

## 2022-09-14 DIAGNOSIS — Z78.9 TAKES DIETARY SUPPLEMENTS: ICD-10-CM

## 2022-09-14 PROCEDURE — 99605 MTMS BY PHARM NP 15 MIN: CPT | Performed by: PHARMACIST

## 2022-09-14 PROCEDURE — 99607 MTMS BY PHARM ADDL 15 MIN: CPT | Performed by: PHARMACIST

## 2022-09-14 RX ORDER — DOXYCYCLINE HYCLATE 100 MG
100 TABLET ORAL 2 TIMES DAILY
Qty: 28 TABLET | Refills: 0 | Status: SHIPPED | OUTPATIENT
Start: 2022-09-14 | End: 2022-10-14

## 2022-09-14 RX ORDER — VIT C/HESPERIDIN/BIOFLAVONOIDS 500-100 MG
1 TABLET ORAL DAILY
COMMUNITY
End: 2024-04-26

## 2022-09-14 RX ORDER — METRONIDAZOLE 500 MG/1
500 TABLET ORAL 2 TIMES DAILY
Qty: 28 TABLET | Refills: 0 | Status: CANCELLED | OUTPATIENT
Start: 2022-09-14

## 2022-09-14 RX ORDER — CLARITHROMYCIN 500 MG
500 TABLET ORAL 2 TIMES DAILY
Qty: 28 TABLET | Refills: 0 | Status: CANCELLED | OUTPATIENT
Start: 2022-09-14

## 2022-09-14 RX ORDER — BISMUTH SUBSALICYLATE 262 MG/1
2 TABLET, CHEWABLE ORAL
Qty: 112 TABLET | Refills: 0 | Status: SHIPPED | OUTPATIENT
Start: 2022-09-14 | End: 2022-10-14

## 2022-09-14 RX ORDER — PANTOPRAZOLE SODIUM 40 MG/1
40 TABLET, DELAYED RELEASE ORAL 2 TIMES DAILY
Qty: 28 TABLET | Refills: 0 | Status: SHIPPED | OUTPATIENT
Start: 2022-09-14 | End: 2022-10-14

## 2022-09-14 RX ORDER — AMOXICILLIN 500 MG/1
1000 CAPSULE ORAL 2 TIMES DAILY
Qty: 56 CAPSULE | Refills: 0 | Status: CANCELLED | OUTPATIENT
Start: 2022-09-14

## 2022-09-14 RX ORDER — METRONIDAZOLE 250 MG/1
250 TABLET ORAL 4 TIMES DAILY
Qty: 56 TABLET | Refills: 0 | Status: SHIPPED | OUTPATIENT
Start: 2022-09-14 | End: 2022-10-14

## 2022-09-14 NOTE — PATIENT INSTRUCTIONS
"Recommendations from today's MTM visit:                                                    MTM (medication therapy management) is a service provided by a clinical pharmacist designed to help you get the most of out of your medicines.   Today we reviewed what your medicines are for, how to know if they are working, that your medicines are safe and how to make your medicine regimen as easy as possible.      1. Please discuss refill of quetiapine and cetirizine with primary care. You can get cetirizine over the counter in the meantime.    2. Recommend bismuth quadruple therapy x 14 days for treatment of H pylori:   -- pantoprazole 40 mg by mouth twice daily   -- doxycycline 100 mg by mouth twice daily   -- metronidazole 250 mg four times daily   -- pepto bismol 524 mg by mouth four times daily     Please stop the medications immediately if you have any signs of allergy (rash/itching/shortness of breath/swelling etc.). Please seek immediate medical care if you are having any swelling of the tongue/lips/throat or difficulty breathing etc.     Follow-up:    -- 7-10 days for follow-up on ability to pickup medications/tolerance   -- at least four weeks after treatment for eradication testing (must be off PPI for at least two weeks)      It was great speaking with you today.  I value your experience and would be very thankful for your time in providing feedback in our clinic survey. In the next few days, you may receive an email or text message from [employer] with a link to a survey related to your  clinical pharmacist.\"     To schedule another MTM appointment, please call the clinic directly or you may call the MTM scheduling line at [phone] or toll-free at [phone].     My Clinical Pharmacist's contact information:                                                      Please feel free to contact me with any questions or concerns you have.      Natalya MontielD, BCACP  MTM Pharmacist   [facility] " Gastroenterology   Phone: [phone]

## 2022-09-14 NOTE — Clinical Note
Following up - I met with Jackie today. We discussed his history with minocycline allergy, and he noted several times that he has tolerated doxycycline previously and was on it for a long time for acne without any concerns. We discussed concomitant therapy to avoid risk, but he said he would prefer to do bismuth quad with doxy so that is what we went with. Please let me know if you have any concerns. Thank you - Natalya

## 2022-09-14 NOTE — PROGRESS NOTES
Medication Therapy Management (MTM) Encounter    ASSESSMENT:                            Medication Adherence/Access: See below for considerations    H pylori: Given he had multiple negative H pylori tests after his treatment in 2013, this is likely a re-infection. We reviewed clarithromycin concomitant therapy (given minocycline allergy), but as below he notes several times that he has been able to tolerate doxycycline without concern. We then discussed the option of bismuth quadruple therapy with doxycycline and he preferred to use this regimen. We will plan on a 14-day course with eradication testing at least four weeks after treatment. If he needs to continue his PPI after treatment, it would be best if he could stop this 14 days prior to re-testing. We discussed allergic reactions and how to manage different severities of this in depth given history with minocycline.    Supplements: Stable.    Insomnia: Encouraged follow-up with primary care.    Allergies: Encouraged him to get cetirizine over-the-counter or discuss with primary care.    PLAN:                            1. Please discuss refill of quetiapine and cetirizine with primary care. You can get cetirizine over the counter.    2. Recommend bismuth quadruple therapy x 14 days for treatment of H pylori:   -- pantoprazole 40 mg by mouth twice daily   -- doxycycline 100 mg by mouth twice daily   -- metronidazole 250 mg four times daily   -- pepto bismol 524 mg by mouth four times daily     Follow-up:    -- 7-10 days for follow-up on ability to pickup medications/tolerance   -- at least four weeks after treatment for eradication testing (must be off PPI for at least two weeks)    SUBJECTIVE/OBJECTIVE:                          Jackie Medeiros is a 27 year old male coming in for an initial visit. He was referred to me from Dr. Richard.      Reason for visit: H pylori    Allergies/ADRs: Reviewed in chart  -- notes that he took doxycycline multiple times and can  tolerate this.   Tobacco: He reports that he has never smoked. He has never used smokeless tobacco.  Alcohol: none    Medication Adherence/Access:   -- notes he would like the cetirizine/quetiapine refilled  -- He states that Dr. Moscoso is no longer his primary doctor, but he is not sure the name of who is      H pylori:   Pantoprazole 40 mg daily    He was previously diagnosed with H pylori in January 2013 and was treated with clarithromycin based triple therapy. He had repeat testing via H pylori antibody IgM in March, which was negative. He also had a stool test repeated in 2015 which was negative. After meeting with Dr. Richard, his H pylori is now positive via stool testing from 8/26/2022. Notes he uses ibuprofen on occasion, uses acetaminophen first. We reviewed overlapping ulcer risk of H pylori and NSAIDs.    Notes that he ate food that people brought to the office and since then he has had a problem with his gut. Notes he needed to go to the ER a couple of times for fluids.    Given minocycline allergy, we discussed clarithromycin concomitant therapy. He states multiple times that he has had doxycycline and done okay. He notes he was even given doxycycline right after the minocycline episode and he did just fine. He states he can tolerate ciprofloxacin, doxycycline, and amoxicillin just fine. I showed him the name of the medication to confirm, and he says yes, that is fine. We then discussed the option of doing bismuth quadruple therapy with doxycycline given his reported tolerance. He said he would prefer to do that regimen and sees no barriers to taking medications four times daily. He believes his mother and potentially his brother also have H pylori. I encouraged him to have them reach out to their providers to get treated, to minimize the potential for re-infection.    We reviewed H pylori today including background information, indications for treatment and potential modes of transmission. We also  "discussed potential for antibiotic resistance and importance of finishing treatment if able, as well as follow-up testing. Discussed bismuth quadruple therapy today including medications, dosing, side effects, precautions and general counseling information. Discussed the potential for bismuth to cause dark tongue/stools and the potential interaction between metronidazole and alcohol. Reviewed spacing of supplements from doxycycline therapy, though he states he will just not take magnesium an zinc during treatment. He is familiar with pepto bismol, as he has taken this before. We also reviewed various types of drug rashes/reactions and how to triage these, given number of antibiotic therapies and history of potential minocycline rash. My contact information was given in the event he has questions/concerns.    We did talk about his minocycline rash/reaction in depth. He states he is not convinced it was from this, but they put this in his chart due to the temporal relationship. In congruence with his account, doxycycline 20 mg twice daily is noted on his medication list from [id number]. The chart notes from his allergy encounter state \"1. Hives- seems resolved. Possible allergy to minocycline given timing\".    Supplements:   Biotin 5000 mcg   Omega-3 2 g daily  Magnesium 250 mg daily (2 capsules)  Zinc 30 mg daily     No reported concerns. Notes getting the supplements of [employer] and he is taking them by choice, not by any specific recommendation.    Insomnia:   Quetiapine 12.5-25 mg nightly as needed    Last ordered in our system in 2020. He would like a refill of this because he notes that his sleeping has not been as good lately.    Allergies:   Cetirizine 10 mg daily (not taking)    He states that he is not taking this. Later on in the appointment he asks for a refill. I am not able to refill this for him since Dr. Moscoso is no longer his primary care provider.     Today's Vitals: /80   Wt 187 lb 14.4 oz (85.2 " kg)   BMI 26.96 kg/m       ----------------    I spent 21 minutes with this patient today. All changes were made via collaborative practice agreement with Barbara Richard. A copy of the visit note was provided to the patient's provider(s).    The patient was sent via [employer] a summary of these recommendations.     Natalya Perea PharmD, BCACP  MTM Pharmacist   [facility] Gastroenterology   Phone: [phone]     Medication Therapy Recommendations  H. pylori infection    Rationale: Untreated condition - Needs additional medication therapy - Indication   Recommendation: Start Medication - bismuth 262 MG Chew   Status: Accepted per CPA   Note: Recommend bismuth quadruple therapy with doxycycline (given reported tolerance) x 14 days for treatment of H pylori.

## 2022-09-26 DIAGNOSIS — A04.8 H. PYLORI INFECTION: ICD-10-CM

## 2022-09-28 NOTE — TELEPHONE ENCOUNTER
PANTOPRAZOLE 40MG TABLETS      Last Written Prescription Date:  9/14/22  Last Fill Quantity: 28,   # refills: 0  Last Office Visit : 8/24/22  Future Office visit:  None scheduled    Routing refill request to provider for review/approval because:  Limited quantity no refills with last fill

## 2022-09-30 RX ORDER — PANTOPRAZOLE SODIUM 40 MG/1
40 TABLET, DELAYED RELEASE ORAL 2 TIMES DAILY
Qty: 28 TABLET | Refills: 0 | OUTPATIENT
Start: 2022-09-30

## 2022-09-30 NOTE — TELEPHONE ENCOUNTER
I spoke with the pharmacy and they state that this is not an automated request, the patient has been trying to fill the RX number that he has on hand (from H pylori treatment). They do have the 90 day supply of daily PPI from Dr. Richard from 8/24 and will fill that instead.

## 2022-09-30 NOTE — TELEPHONE ENCOUNTER
Jackie was given a 90 day trial of daily PPI by Dr. Richard in August. After finding H pylori results - increased dose to twice daily for a limited time (14 days for treatment). If Jackie needs continued PPI, he should have a supply to dose pantoprazole 40 mg daily as written 8/24/2022.

## 2022-09-30 NOTE — TELEPHONE ENCOUNTER
I attempted to contact Jackie to discuss the situation. Left message for return call. He has not read my [employer] message regarding H pylori treatment completion, so we still need to clarify that he was able to complete the medications as directed.

## 2022-10-10 ENCOUNTER — TELEPHONE (OUTPATIENT)
Dept: GASTROENTEROLOGY | Facility: CLINIC | Age: 27
End: 2022-10-10

## 2022-10-10 NOTE — TELEPHONE ENCOUNTER
Health Call Center    Phone Message    May a detailed message be left on voicemail: yes     Reason for Call: Medication Question or concern regarding medication   Prescription Clarification  Name of Medication: metroNIDAZOLE (FLAGYL) 250 MG tablet  Prescribing Provider:    Pharmacy: [id number] - [place], [place] - [address] AT [address] [place] [address]   What on the order needs clarification? Patient was calling in and explained that his medication is not working for him. He is wondering if there is a different medication regime that he can be taking to help with his GI symptoms. He is also wondering if a letter can be written excusing him from work because of his symptoms. Please call him back at [phone] to discuss, thank you!          Action Taken: Message routed to:  [facility]    Travel Screening: Not Applicable

## 2022-10-10 NOTE — TELEPHONE ENCOUNTER
I have been trying to reach Jackie to confirm status of H pylori treatment and inquire if he returned to taking pantoprazole 40 mg daily. Dr. Richard also mentioned a number of behavioral techniques for GERD.     Left message for return call to discuss the above and eradication testing if he has completed therapy (at least four weeks after treatment and must be off PPI for two weeks prior to submitting sample).

## 2022-10-13 NOTE — TELEPHONE ENCOUNTER
Spoke with Jackie to let him know that Nichole left him a message on the 10th regarding the treatment and she has not received a call back.   Provided the contact information for Nichole and he stated that he will call her back.

## 2022-10-14 ENCOUNTER — TELEPHONE (OUTPATIENT)
Dept: PHARMACY | Facility: CLINIC | Age: 27
End: 2022-10-14

## 2022-10-14 DIAGNOSIS — A04.8 H. PYLORI INFECTION: Primary | ICD-10-CM

## 2022-10-14 NOTE — TELEPHONE ENCOUNTER
Jackie notes that he needs something else because treatment didn't work. He believes it was the metronidazole that didn't work because he had nausea/vomiting with this. He was able to complete 6-8 days of therapy. Mainly the metronidazole was the problem, but he did take the medications all together for about 8 days. He completed a full course of doxycycline + pantoprazole + pepto bismol.    The last day that he took metronidazole was 8 days after he started. Notes pepto bismol he last took about two days ago. He has been taking pantoprazole once daily, but notes he continues to have symptoms and he is only able to eat rice with buttermilk.    We reviewed that given he completed 8 days of treatment, that may be enough to eradicate H pylori. Reviewed the plan for re-testing on November 9 th or after and that he would need to stop PPI on 10/26, but could consider using TUMS/famotidine to manage his symptoms. Reviewed possibility of false negatives with pepto bismol. I understand that the continues to have symptoms, but we discuss that it would be important to know if the symptoms are from H pylori or something else.    He notes that he needs a letter stating that he cannot complete all his work duties, especially during high volume times. He needs to take his medication, eat snacks and take sips of water.    Works Monday/friday/saturday/sunday and he can't do Monday/fridays and would like to have these days off.    Jackie becomes argumentative stating the problem is not gone and we have not followed up with him. He states that he is bothered that we think that giving him pills for 10 days would make the problem go away. I again clarified that I did not mean to imply this, rather it is important to know if his ongoing symptoms are from H pylori or something different. We reviewed that our office tried to make contact with him on 9/26 (MyChart), 9/30 (phone), 10/10 (phone), 10/13 (phone) and today (phone). He insists  that we have not returned his contacts and he requested a letter on Monday which he has still not received.     Notes he prefers discussion and responses via [employer] from now on, and is done with phone calls.     Call time: 27 minutes

## 2022-10-24 ENCOUNTER — MYC MEDICAL ADVICE (OUTPATIENT)
Dept: GASTROENTEROLOGY | Facility: CLINIC | Age: 27
End: 2022-10-24

## 2022-10-31 ENCOUNTER — OFFICE VISIT (OUTPATIENT)
Dept: FAMILY MEDICINE | Facility: CLINIC | Age: 27
End: 2022-10-31
Payer: COMMERCIAL

## 2022-10-31 ENCOUNTER — TELEPHONE (OUTPATIENT)
Dept: GASTROENTEROLOGY | Facility: CLINIC | Age: 27
End: 2022-10-31

## 2022-10-31 DIAGNOSIS — L90.5 ACNE SCARRING: Primary | ICD-10-CM

## 2022-10-31 PROCEDURE — 99202 OFFICE O/P NEW SF 15 MIN: CPT | Performed by: PHYSICIAN ASSISTANT

## 2022-10-31 RX ORDER — TRETINOIN 0.25 MG/G
CREAM TOPICAL
Qty: 45 G | Refills: 3 | Status: SHIPPED | OUTPATIENT
Start: 2022-10-31 | End: 2024-04-26

## 2022-10-31 ASSESSMENT — PAIN SCALES - GENERAL: PAINLEVEL: NO PAIN (0)

## 2022-10-31 NOTE — TELEPHONE ENCOUNTER
Spoke with Jackie and he is going to follow up with his primary MD regarding the paperwork.   He confirmed that he needs to retest for H pylori on 11-9   Order has been placed

## 2022-10-31 NOTE — TELEPHONE ENCOUNTER
Health Call Center    Phone Message    May a detailed message be left on voicemail: yes     Reason for Call: Other: Patient submited forms to be completed on 10/28/22, via [employer].  He needs this forms completed and returned by 11/3/22, as his job is in jeopardy.  Please have Dr. Richard complete paperwork ASAP, per patient.    Action Taken: Message routed to:  [facility]    Travel Screening: Not Applicable

## 2022-10-31 NOTE — LETTER
10/31/2022         RE: Jackie Medeiros  [address] [place] [address]        Dear Colleague,    Thank you for referring your patient, Jackie Medeiros, to the [facility]. Please see a copy of my visit note below.    [facility] Dermatology Note  Encounter Date: Oct 31, 2022  Office Visit     Dermatology Problem List:  1. Acne vulgaris  - Previous tx: isotretinoin x 2mo, cephalexin, clindamycin, amoxicillin, BPO wash, tretinoin  2. Dermatitis/xerosis 2/2 isotretinoin use  - Previous tx: triamcinolone  3. Acneiform scarring  - Current tx: tretinoin 0.025% cream  ____________________________________________    Assessment & Plan:    # Acneiform scarring, face, no longer getting acneiform breakouts.   - Start tretinoin 0.025% cream nightly to face  - edu that PIH will improve with continued use  - adverse effects discussed    Procedures Performed:   None    Follow-up: prn for new or changing lesions    Staff and Scribe:     Scribe Disclosure:  I, William Berman, am serving as a scribe to document services personally performed by Mariza Jimenez PA-C based on data collection and the provider's statements to me.   Provider Disclosure:   The documentation recorded by the scribe accurately reflects the services I personally performed and the decisions made by me.    All risks, benefits and alternatives were discussed with patient.  Patient is in agreement and understands the assessment and plan.  All questions were answered.    Mariza Jimenez PA-C, MPAS  [facility] [place]: Phone: [phone], Fax: [phone]  [facility]: Phone: [phone],  Fax: [phone]  [facility]: Phone: [phone], Fax: [phone]    ____________________________________________    CC: Derm Problem (Discuss some discoloration on face along with acne scars)    HPI:  Mr. Jackie MALHOTRA  Mala is a(n) 27 year old male who presents today as a return patient for acne scarring. Last seen in dermatology by Georgiana Singh PA-C on 11/18/2020, at which time patient was started on triamcinolone for treatment of isotretinoin-induced dermatitis/xerosis.    Today, he endorses significant facial acne scarring. He was previously on isotretinoin, but stopped his treatment course once his acne resolved. He does not use anything on his face currently. He is traveling to [facility] on January 14 for 3 months.    Patient is otherwise feeling well, without additional skin concerns.    Labs Reviewed:  N/A    Physical Exam:  Vitals: There were no vitals taken for this visit.  SKIN: Focused examination of the face was performed.  - Acneiform scarring is noted on the the face.   No active papules or pustules.  - No other lesions of concern on areas examined.     Medications:  Current Outpatient Medications   Medication     Biotin 5000 MCG CAPS     Cholecalciferol (VITAMIN D-3) 125 MCG (5000 UT) TABS     ibuprofen (ADVIL/MOTRIN) 800 MG tablet     Magnesium Oxide 250 MG TABS     Omega-3 Fatty Acids (OMEGA-3 FISH OIL PO)     pantoprazole (PROTONIX) 40 MG EC tablet     QUEtiapine (SEROQUEL) 25 MG tablet     Zinc 30 MG TABS     No current facility-administered medications for this visit.      Past Medical History:   Patient Active Problem List   Diagnosis     GERD (gastroesophageal reflux disease)     Abnormal laboratory test     Environmental allergies     Hyperlipidemia LDL goal <130     Anemia     Vitamin D deficiency     Past Medical History:   Diagnosis Date     Abnormal laboratory test 1/1/2012     Allergic state      Anemia 8/6/2013     Environmental allergies 1/6/2013     Vitamin D deficiency 1/24/14    vitamin D level 23              Again, thank you for allowing me to participate in the care of your patient.        Sincerely,        Mariza Jimenez PA-C

## 2022-10-31 NOTE — PROGRESS NOTES
[facility] Dermatology Note  Encounter Date: Oct 31, 2022  Office Visit     Dermatology Problem List:  1. Acne vulgaris  - Previous tx: isotretinoin x 2mo, cephalexin, clindamycin, amoxicillin, BPO wash, tretinoin  2. Dermatitis/xerosis 2/2 isotretinoin use  - Previous tx: triamcinolone  3. Acneiform scarring  - Current tx: tretinoin 0.025% cream  ____________________________________________    Assessment & Plan:    # Acneiform scarring, face, no longer getting acneiform breakouts.   - Start tretinoin 0.025% cream nightly to face  - edu that PIH will improve with continued use  - adverse effects discussed    Procedures Performed:   None    Follow-up: prn for new or changing lesions    Staff and Scribe:     Scribe Disclosure:  I, William Berman, am serving as a scribe to document services personally performed by Mariza Jimenez PA-C based on data collection and the provider's statements to me.   Provider Disclosure:   The documentation recorded by the scribe accurately reflects the services I personally performed and the decisions made by me.    All risks, benefits and alternatives were discussed with patient.  Patient is in agreement and understands the assessment and plan.  All questions were answered.    Mariza Jimenez PA-C, MPAS  [facility] [place]: Phone: [phone], Fax: [phone]  [facility]: Phone: [phone],  Fax: [phone]  [facility]: Phone: [phone], Fax: [phone]    ____________________________________________    CC: Derm Problem (Discuss some discoloration on face along with acne scars)    HPI:  Mr. Jackie Medeiros is a(n) 27 year old male who presents today as a return patient for acne scarring. Last seen in dermatology by Georgiana Singh PA-C on 11/18/2020, at which time patient was started on triamcinolone for treatment of isotretinoin-induced  dermatitis/xerosis.    Today, he endorses significant facial acne scarring. He was previously on isotretinoin, but stopped his treatment course once his acne resolved. He does not use anything on his face currently. He is traveling to [facility] on January 14 for 3 months.    Patient is otherwise feeling well, without additional skin concerns.    Labs Reviewed:  N/A    Physical Exam:  Vitals: There were no vitals taken for this visit.  SKIN: Focused examination of the face was performed.  - Acneiform scarring is noted on the the face.   No active papules or pustules.  - No other lesions of concern on areas examined.     Medications:  Current Outpatient Medications   Medication     Biotin 5000 MCG CAPS     Cholecalciferol (VITAMIN D-3) 125 MCG (5000 UT) TABS     ibuprofen (ADVIL/MOTRIN) 800 MG tablet     Magnesium Oxide 250 MG TABS     Omega-3 Fatty Acids (OMEGA-3 FISH OIL PO)     pantoprazole (PROTONIX) 40 MG EC tablet     QUEtiapine (SEROQUEL) 25 MG tablet     Zinc 30 MG TABS     No current facility-administered medications for this visit.      Past Medical History:   Patient Active Problem List   Diagnosis     GERD (gastroesophageal reflux disease)     Abnormal laboratory test     Environmental allergies     Hyperlipidemia LDL goal <130     Anemia     Vitamin D deficiency     Past Medical History:   Diagnosis Date     Abnormal laboratory test 1/1/2012     Allergic state      Anemia 8/6/2013     Environmental allergies 1/6/2013     Vitamin D deficiency 1/24/14    vitamin D level 23

## 2022-11-04 NOTE — TELEPHONE ENCOUNTER
Spoke with the patient regarding the request and he is going to follow up with his primary MD regarding the forms

## 2022-11-13 ASSESSMENT — SLEEP AND FATIGUE QUESTIONNAIRES
HOW LIKELY ARE YOU TO NOD OFF OR FALL ASLEEP WHILE SITTING AND READING: MODERATE CHANCE OF DOZING
HOW LIKELY ARE YOU TO NOD OFF OR FALL ASLEEP WHILE SITTING AND TALKING TO SOMEONE: MODERATE CHANCE OF DOZING
HOW LIKELY ARE YOU TO NOD OFF OR FALL ASLEEP WHILE WATCHING TV: HIGH CHANCE OF DOZING
HOW LIKELY ARE YOU TO NOD OFF OR FALL ASLEEP WHILE SITTING QUIETLY AFTER LUNCH WITHOUT ALCOHOL: SLIGHT CHANCE OF DOZING
HOW LIKELY ARE YOU TO NOD OFF OR FALL ASLEEP WHEN YOU ARE A PASSENGER IN A CAR FOR AN HOUR WITHOUT A BREAK: HIGH CHANCE OF DOZING
HOW LIKELY ARE YOU TO NOD OFF OR FALL ASLEEP IN A CAR, WHILE STOPPED FOR A FEW MINUTES IN TRAFFIC: MODERATE CHANCE OF DOZING
HOW LIKELY ARE YOU TO NOD OFF OR FALL ASLEEP WHILE SITTING INACTIVE IN A PUBLIC PLACE: HIGH CHANCE OF DOZING
HOW LIKELY ARE YOU TO NOD OFF OR FALL ASLEEP WHILE LYING DOWN TO REST IN THE AFTERNOON WHEN CIRCUMSTANCES PERMIT: HIGH CHANCE OF DOZING

## 2022-11-15 ENCOUNTER — VIRTUAL VISIT (OUTPATIENT)
Dept: SLEEP MEDICINE | Facility: CLINIC | Age: 27
End: 2022-11-15
Attending: INTERNAL MEDICINE
Payer: COMMERCIAL

## 2022-11-15 VITALS — WEIGHT: 187 LBS | HEIGHT: 70 IN | BODY MASS INDEX: 26.77 KG/M2

## 2022-11-15 DIAGNOSIS — F34.0 CYCLOTHYMIC DISORDER: Primary | ICD-10-CM

## 2022-11-15 DIAGNOSIS — G47.00 INSOMNIA, UNSPECIFIED TYPE: ICD-10-CM

## 2022-11-15 DIAGNOSIS — F51.04 CHRONIC INSOMNIA: ICD-10-CM

## 2022-11-15 PROCEDURE — 90791 PSYCH DIAGNOSTIC EVALUATION: CPT | Mod: 95 | Performed by: PSYCHOLOGIST

## 2022-11-15 NOTE — PATIENT INSTRUCTIONS
Recommendations:    1.  Contact your primary care physician to inquire about refilling your quetiapine (Seroquel) as you indicate that it was in the past effective in treating your episodic insomnia.    2.  I placed a mental health referral as I believe there may be an underlying mood disorder contributing to your episodes of insomnia occurring about twice per year.    3.  You are at low risk for sleep apnea or other sleep disorder so a sleep study is not needed at this time.    4.  I like you to avoid using any screens or devices or television in your bedroom.  And stop using screens and devices by 8 PM.    5.  Reestablish your normal sleep schedule between 10 PM-6 AM with alarm.    6.  Okay to use white noise machine in your bedroom.    7.  Importantly, avoid use of any caffeine after noon, 2-3 cups of black tea in the morning is fine.    Will follow-up on December 30 to see how you are doing with your sleep.

## 2022-11-15 NOTE — Clinical Note
Dr. Moscoso, Please see my recommendations.  I recommended patient see you to consider refilling his quetiapine for insomnia.  The episodic nature of his insomnia with psychomotor activation and no EDS raises concern for a mood disorder for which he was apparently seen in 2017

## 2022-11-15 NOTE — NURSING NOTE
Chief Complaint   Patient presents with     Video Visit     Insomnia       Patient confirms medications and allergies are accurate via patients echeck in completion, and or denies any changes since last reviewed/verified.     Brittny Prater, Virtual Facilitator/LPN

## 2022-11-15 NOTE — Clinical Note
Dr. Moscoso, Mr. Cannon was seen in our sleep clinic for evaluation of his insomnia.  Sleep study is not indicated.  It is my opinion that his episodic insomnia is due to an underlying mood disorder.  I did place a mental health referral and suggested he follow-up with you to consider refill iof s quetiapine this has helped him sleep better during these episodes of that he also has a degree of psychomotor restlessness and general activation.  I will follow-up with him in 6 weeks to see how he is doing.

## 2022-11-15 NOTE — PROGRESS NOTES
Jackie is a 27 year old who is being evaluated via a billable video visit.      How would you like to obtain your AVS? MyChart  If the video visit is dropped, the invitation should be resent by: Text to cell phone: [phone]  Will anyone else be joining your video visit? Jenise Prater, Naomi Facilitator/LPN      Video-Visit Details    Video Start Time: 8:59 AM    Type of service:  Video Visit    Video End Time:9:46 AM    Originating Location (pt. Location): Home    Distant Location (provider location):  Off-site    Platform used for Video Visit: [employer].    SLEEP MEDICINE CONSULTATION  Sleep Psychology  Nov 15, 2022    Name: Jackie Medeiros MRN# [id number]   Age: 27 year old YOB: 1995     Date of Consultation: Nov 15, 2022  Consultation is requested by: Samantha Wilhelm DO  [id number] [id number]  Primary care provider: Yves Moscoso    Reason for Sleep Consultation     Jackie Medeiros is a 27 year old male seen today for a behavioral sleep medicine consultation because of insomnia    Assessment and Plan     Sleep Diagnoses:         Chronic insomnia        Insomnia Due To Cyclothymic Disorder    Co-occurring Conditions/Factor:       Cyclothymic Disorder       GERD       Vitamin D Deficiency          Clinical Impressions:    Jackie Medeiros was seen for a sleep psychology consultation and possible behavioral sleep intervention and treatment.  History clinical presentation indicates recurrent episodes of insomnia, possibly with a seasonal component and previously reportedly diagnosed as associated with an unspecified mood disorder/depression in 2017.  Patient is currently reporting moderate symptoms of depression characterized by markedly reduced level of sleep, poor concentration psychomotor activation, fatigue without sleepiness without suicidal ideation, reported depressed mood.  The overall level of activation and degree of insomnia raises concern about  bipolar mood disorder.  He reports that his insomnia was successfully treated with quetiapine during previous episodes.  Trazodone was not helpful.    This sleep evaluation indicates low pretest probability for obstructive sleep apnea or other intrinsic sleep disorders.  A laboratory sleep study is not indicated.    Recommendations and Counselin.  Establish a consistent sleep-wake schedule with bedtime of 10 PM, rise time with alarm of 6 AM, elimination of use of screens, computers and devices by 8 PM followed by a period of low light setting prior to bedtime.    2.  He agreed to eliminate afternoon and evening use of caffeine, and limit his caffeine use to 3 cups of black tea in the mornings.    3.  He has family and children and was been the evening after 8 PM with his family and trying to wind down before his bedtime.    4.  Patient will reach out to his primary care physician to refill his quetiapine as this, by his report, has been the only medication that helps improve sleep quality and total sleep time.    5.  We will place a mental health referral for further evaluation of suspected mood disorder previously diagnosed as cyclothymic disorder in 2017  At the time of this evaluation he is not reporting any suicidal ideation intention or plan and overall appears to be at low risk for self-harm.  He does not use alcohol or have any weapons in the home.    Jackie was provided information about the pathophysiology of insomnia, psychophysiological factors contributing to the onset and maintenance of insomnia and how co-occurring medical conditions and intrinsic sleep disorders can affect sleep.  Treatment options were discussed  as applicable to patient specific sleep concerns and symptoms. The benefits and potential early side effects of treatment including increased daytime sleepiness were discussed.     Patient was advised to consult with their prescribing provider around use of or changes to  prescription sleep medication.  Patient was counseled on the importance of avoiding driving if drowsy.    Follow-up: 6-week follow-up with behavioral sleep medicine clinic     History of Present Sleep Complaint     Jackie Medeiros is a 27 year old year old male with a relevant medical history of  GERD who presents with onset of insomnia since 2014.  He reports at the beginning of every season, fall and spring, he experiences sleep disruption and insomnia. He reports his sleep disturbance is most prominent in Fall.    He states he was seen by his primary care providers and he was seen by a psychiatry specialist in 2017 and was diagnosed with depression.  He was given Seroquel which helped but he has no prescription.  Review of available medical records indicates patient was seen by psychiatry in which he was diagnosed and treated for cyclothymic disorder.    He reports this episode of insomnia began in July.  Prior to this he was getting 9- 10 hours of sleep.  Now he reports an average of 4 hours of sleep.  His last previous episode was in May 2021.  He typically gets these episodes of marked insomnia, increased psychomotor activation about twice a year.    Daytime consequences insufficient sleep and insomnia include increased tiredness though he has inability to sleep during the day he does not report excessive daytime sleepiness.    He is drinking excessive caffeine to cope with his e    xcessive tiredness, poor attention and concentration.  He is employed maladaptive strategies to manage insomnia including going to bed for an evening 5 hours earlier than his normal bedtime of 10 PM.  He is now waking up at 330-4 AM when typically he would wake up usually between 8-9 AM.    He does not report a history of panic attacks, generalized anxiety, rumination or obsessive-compulsive behavior rituals.  Sleeplessness is not been associated with hallucinations or  delusions.    ______________________________________________    Activities in bed: Use phone, computer, or tablet    Current prescribed or over the counter stimulants: None    Current prescribed or over the counter sleep medication: None    Previous sleep medications patients has tried: Quitapine/seruquile    SLEEP-WAKE SCHEDULE:    Work/School Days: Patient goes to school/work: Yes       Time to Bed:  various. lately I have being sleeping like 6 or 6:30 PM      Sleep Latency: sometimes an hour to 2 hours to fall asleep      Difficulty falling asleep:  everyday nights per week      Number of awakenings: 3 times a night times per night due to Uncertain      Trouble falling back asleep everyday  times a week       Usually takes 2 to 3 hours to get back to sleep                Rise time: lately 3 to 4:30 AM      Uses alarm? No    Weekends/Non-work Days/All Other Days      Usually gets into bed at various 5:45-6:30 PM Lately       Sleep latency:  1 to 2 hours       Rise time: 3:30 - 4:30 AM times      Uses alarm? No    Patient sleep estimates:      Average total sleep time:  4 hours       Patient estimate of sleep need: 9 hours      Preferred sleep position(s): Side     Naps      Intentional naps: lately about an hour to 90 minutes times a week      Duration:  45-90 minutes in duration      Feels better after a nap: Yes      Unintentional Dozing:  3 days per week      Driving accident or near-miss due to sleepiness/drowsiness? No    SLEEP DISRUPTIONS:    Breathing/Snoring      Patient snores:No      Other people complain about His snoring: No      Patient has been told He stops breathing in His sleep: No      He has issues with any of the following: Morning headaches, Morning mouth dryness, Stuffy nose when you wake up, Heartburn or reflux at night, Getting up to urinate more than once    Movement:      Patient gets pain, discomfort, with an urge to move: No      Symptoms occur when He is resting: No      Symptoms occur  more at night:No      Patient has been told He kicks His legs at night:No     Behaviors in Sleep:      Jackie Medeiros has experienced the following behaviors while sleeping: Eating      He has experienced sudden muscle weakness during the day: No    Caffeine, Alcohol and Other Substances:      Number of caffeinated beverages(per day: 2 cups of tea every morning      Last caffeine use is usually: morning like 6-9:30 AM      Uses alcohol to promote sleep: No      Drinks alcohol near bedtime: No      FAMILY HISTORY OF SLEEP DISORDERS      Patient has a family member been diagnosed with a sleep disorder: No                PREVIOUS SLEEP EVALUATIONS:    None reported    SCALES      EPWORTH SLEEPINESS SCALE    Likeliness of dozing or falling  asleep:    Sitting and reading: Moderate chance of dozing    Watching TV: High chance of dozing    Sitting, inactive in a public place (e.g. a theatre or a meeting): High chance of dozing    As a passenger in a car for an hour without a break: High chance of dozing    Lying down to rest in the afternoon when circumstances permit: High chance of dozing    Sitting and talking to someone: Moderate chance of dozing    Sitting quietly after a lunch without alcohol: Slight chance of dozing    In a car, while stopped for a few minutes in traffic: Moderate chance of dozing    Total score - [place]: 19      INSOMNIA SEVERITY INDEX (FRED)      The Insomnia Severity Index measures the severity of insomnia symptoms over the past two weeks.    1. Difficulty falling asleep: Moderate    2. Difficulty staying asleep: Very Severe    3. Problems waking up too early: Very Severe    4. How SATISFIED/DISSATISFIED are youwith your CURRENT sleep pattern? Very Dissatisfied    5. How NOTICEABLE to others do you think your sleep problem is in terms of impairing the quality of your life? Much      6. How WORRIED/DISTRESSED are you about your sleep problem? Very Much Worried    7. To what extent do you  consider your sleep problem to INTERFERE with your daily functioning (e.g. daytime fatigue, mood, ability to functon at work/daily chores, concentration, memory, mood, etc.) CURRENTLY?   Very Much Interfering     FRED Total Score: 25    Guidelines for Scoring/Interpretation:   0-7 = No clinically significant insomnia   8-14 = Subthreshold insomnia   15-21 = Clinical insomnia (moderate severity)  22-28 = Clinical insomnia (severe)      STOP BANG     1. Snoring: Do you snore loudly (louder than talking or loud enough to be heard through closed doors)?  No    2. Tired: Do you often feel tired, fatigued, or sleepy during daytime?  Yes    3. Observed: Has anyone observed you stop breathing during your sleep?  No    4. Blood pressure: Do you have or are you being treated for high blood pressure?  No    5. BMI: BMI more than 35 kg/m2?  No    6. Age: Age over 50 yr old?  No    7. Neck circumference: Neck circumference greater than 40 cm?  No    8. Gender: Gender male?  Yes    STOP-BANG Total Score: 2    BAL Risk  0-2 Low risk for BAL  3-4  Intermediate risk for BAL  5-8 High risk      PATIENT HEALTH QUESTIONNAIRE-9 (PHQ - 9)    Over the last 2 weeks, how often have you been bothered by any of the following problems?    1. Little interest or pleasure in doing things -  0   2. Feeling down, depressed, or hopeless -  0   3. Trouble falling or staying asleep, or sleeping too much -  3   4. Feeling tired or having little energy -   3   5. Poor appetite or overeating -   3   6. Feeling bad about yourself - or that you are a failure or have let yourself or your family down -   0   7. Trouble concentrating on things, such as reading the newspaper or watching television -  3   8. Moving or speaking so slowly that other people could have noticed? Or the opposite - being so fidgety or restless that you have been moving around a lot more than usual  3   9. Thoughts that you would be better off dead or of hurting  yourself in some way  0    Total Score:       If you checked off any problems, how difficult have these problems made it for you to do your work, take care of things at home, or get along with other people?      Developed by Naga Witt, Hannah Cordero, Eduardo Frazier and colleagues, with an educational emery from [employer] No permission required to reproduce, translate, display or distribute. permission required to reproduce, translate, display or distribute.    Vitals     There were no vitals taken for this visit.     Medical History     Allergies:    Allergies   Allergen Reactions     Dust Mites      Minocycline Hives       Medications:    Current Outpatient Medications   Medication Sig Dispense Refill     Biotin 5000 MCG CAPS Take 1 capsule by mouth daily       Cholecalciferol (VITAMIN D-3) 125 MCG (5000 UT) TABS Take 5,000 Units by mouth daily       ibuprofen (ADVIL/MOTRIN) 800 MG tablet Take 1 tablet (800 mg) by mouth every 8 hours as needed 30 tablet 8     Magnesium Oxide 250 MG TABS Take 2 capsules by mouth daily       Omega-3 Fatty Acids (OMEGA-3 FISH OIL PO) Take 2 g by mouth daily       pantoprazole (PROTONIX) 40 MG EC tablet Take 1 tablet (40 mg) by mouth daily 90 tablet 0     QUEtiapine (SEROQUEL) 25 MG tablet Take 0.5-1 tablets (12.5-25 mg) by mouth nightly as needed (for insomnia or for agitation) 90 tablet 1     tretinoin (RETIN-A) 0.025 % external cream Use every night on clean skin, use a pea sized amount. 45 g 3     Zinc 30 MG TABS Take 1 tablet by mouth daily         Problem List:  Patient Active Problem List    Diagnosis Date Noted     Vitamin D deficiency 01/24/2014     Priority: Medium     vitamin D level 23       Anemia 08/06/2013     Priority: Medium     Hyperlipidemia LDL goal <130 02/01/2013     Priority: Medium     Environmental allergies 01/06/2013     Priority: Medium     Abnormal laboratory test 01/01/2012     Priority: Medium     GERD (gastroesophageal reflux disease) 06/27/2010     Priority:  Medium        Past Medical/Surgical History:  Past Medical History:   Diagnosis Date     Abnormal laboratory test 1/1/2012     Allergic state      Anemia 8/6/2013     Environmental allergies 1/6/2013     Vitamin D deficiency 1/24/14    vitamin D level 23     Patient Active Problem List    Diagnosis Date Noted     Vitamin D deficiency 01/24/2014     Priority: Medium     vitamin D level 23       Anemia 08/06/2013     Priority: Medium     Hyperlipidemia LDL goal <130 02/01/2013     Priority: Medium     Environmental allergies 01/06/2013     Priority: Medium     Abnormal laboratory test 01/01/2012     Priority: Medium     GERD (gastroesophageal reflux disease) 06/27/2010     Priority: Medium     Patient Active Problem List   Diagnosis     GERD (gastroesophageal reflux disease)     Abnormal laboratory test     Environmental allergies     Hyperlipidemia LDL goal <130     Anemia     Vitamin D deficiency        Mental Health History     Prior Mental Health Diagnosis:    Cyclothymic Disorder, diagnosed in 2017    Mental Health Treatment:   None reported currently, previously seen by psychiatry in 2017 and placed on quetiapine for insomnia and mood. No prior suicidal ideation or attempts reported    Chemical Abuse/Treatment:    None reported      Family History     Family History   Problem Relation Age of Onset     Family History Negative No family hx of        Social History     Social History     Socioeconomic History     Marital status: Single   Tobacco Use     Smoking status: Never     Smokeless tobacco: Never   Substance and Sexual Activity     Alcohol use: No     Drug use: No     Sexual activity: Yes     Partners: Female   Social History Narrative    Brother:    7 yrs older, in [place]    11 yrs younger        Sisters    5 yrs older    12 yrs older        Came to [place] from [place] via [place].        He is 1 yr younger.                   Patient currently has returned to being a full-time student.  He states that  he resigned from his job due to sleeplessness and insomnia affecting his ability to drive into work effectively.     Mental Status Examination     Jackie presented as oriented X3 with speech and language intact.  The patient was cooperative throughout the evaluation with no signs of hallucinations, delusions, loosening of associations or other thought disturbance.  Mood was normal Affect was congruent with mood. Insight and judgement were intact.  Memory appeared intact for recent and remote elements.  There was no report of suicidal ideation, intention or plan. Attention and concentration were within normal.        Greg Fry PsyD, LP, [facility]  Diplomate, Behavioral Sleep Medicine  [facility]      Copy:   Yves Moscoso,   [address] [place] [id number]    Note: This dictation was created using voice recognition software. This document may contain an error not identified before finalizing the document. If the error changes the accuracy of the document, I would appreciate it being brought to my attention.

## 2022-12-30 ENCOUNTER — VIRTUAL VISIT (OUTPATIENT)
Dept: SLEEP MEDICINE | Facility: CLINIC | Age: 27
End: 2022-12-30
Payer: COMMERCIAL

## 2022-12-30 VITALS — WEIGHT: 187 LBS | HEIGHT: 71 IN | BODY MASS INDEX: 26.18 KG/M2

## 2022-12-30 DIAGNOSIS — F34.0 CYCLOTHYMIC DISORDER: ICD-10-CM

## 2022-12-30 DIAGNOSIS — F51.04 CHRONIC INSOMNIA: Primary | ICD-10-CM

## 2022-12-30 PROCEDURE — 90832 PSYTX W PT 30 MINUTES: CPT | Mod: 95 | Performed by: PSYCHOLOGIST

## 2022-12-30 ASSESSMENT — SLEEP AND FATIGUE QUESTIONNAIRES
HOW LIKELY ARE YOU TO NOD OFF OR FALL ASLEEP WHILE LYING DOWN TO REST IN THE AFTERNOON WHEN CIRCUMSTANCES PERMIT: HIGH CHANCE OF DOZING
HOW LIKELY ARE YOU TO NOD OFF OR FALL ASLEEP WHILE SITTING AND TALKING TO SOMEONE: WOULD NEVER DOZE
HOW LIKELY ARE YOU TO NOD OFF OR FALL ASLEEP WHILE SITTING INACTIVE IN A PUBLIC PLACE: WOULD NEVER DOZE
HOW LIKELY ARE YOU TO NOD OFF OR FALL ASLEEP WHILE WATCHING TV: MODERATE CHANCE OF DOZING
HOW LIKELY ARE YOU TO NOD OFF OR FALL ASLEEP WHILE SITTING QUIETLY AFTER LUNCH WITHOUT ALCOHOL: HIGH CHANCE OF DOZING
HOW LIKELY ARE YOU TO NOD OFF OR FALL ASLEEP WHEN YOU ARE A PASSENGER IN A CAR FOR AN HOUR WITHOUT A BREAK: MODERATE CHANCE OF DOZING
HOW LIKELY ARE YOU TO NOD OFF OR FALL ASLEEP WHILE SITTING AND READING: HIGH CHANCE OF DOZING
HOW LIKELY ARE YOU TO NOD OFF OR FALL ASLEEP IN A CAR, WHILE STOPPED FOR A FEW MINUTES IN TRAFFIC: WOULD NEVER DOZE

## 2022-12-30 NOTE — PATIENT INSTRUCTIONS
Psychiatry Referrals:    [facility], Ron Watts MD,  [place],  [phone]    Keep 10 PM to 6 Am sleep schedule.    Schedule a followup with your primary care doctor for review and possible refill of your quetiapine (Seroquel)

## 2022-12-30 NOTE — PROGRESS NOTES
Jackie is a 27 year old who is being evaluated via a billable video visit.      How would you like to obtain your AVS? Mail a copy  If the video visit is dropped, the invitation should be resent by: Text to cell phone: [phone]  Will anyone else be joining your video visit? Jenise  Xiomy Diehl        Video-Visit Details    Type of service:  Video Visit   Video Start Time: 10:00 AM  Video End Time:10:19 AM    Originating Location (pt. Location): Home  Distant Location (provider location):  Off-site  Platform used for Video Visit: [facility]     SLEEP MEDICINE VIRTUAL VIDEO FOLLOW-UP VISIT  Sleep Psychology    Patient Name: Jackie Medeiros  MRN:  [id number]  Date of Service: Dec 30, 2022       Subjective Report     Jackie Medeiros  returns for a telehealth video visit to discuss progress in implementing behavioral strategies for the management of insomnia.  Patient consent for initiation of video visit was obtained and documented prior to initiation of visit.     Jackie reports mixed results with behavioral recommendations to improve insomnia.  He states that when his mood is stable, sleep improves but that he goes through periods where he has increased anxiety and acceleration of motor activity with resulting days of very poor quality sleep and marked sleep disruption.  He then will become somewhat sleepy and this disrupts his sleep-wake pattern.    Patient reports he did get scheduled to see psychiatrist for medication management and review in April.  He was not yet able to get a refill of his quetiapine, which he states helps him sleep better on a more consistent basis.  He also feels it helps with his mood..     .     Sleep Data:     Source of Sleep Estimates:  Verbal Self-report    Average Time in Bed: Variable time in bed, 5-10 hours  Average Total Sleep Time: Highly variable hrs  Sleep Efficiency: Unable to determine estimate %    EPWORTH SLEEPINESS SCALE    Sitting and reading 3   Watching TV 2   Sitting,  "inactive in a public place (theatre or mtg.) 0    As a passenger in a car 2   Lying down to rest in the afternoon when circumstance permit 3   Sitting and talking to someone 0   Sitting quietly after lunch without alcohol 3   In a car, while stopped for a few minutes in traffic 0   TOTAL SCORE (nl <11) 13     INSOMNIA SEVERITY INDEX     Difficulty falling asleep 0   Difficult staying asleep 3   Problems waking up to early 3   How SATISFIED/DISSATISFIED are you with your CURRENT sleep pattern? 4   How NOTICEABLE to others do you think your sleep pattern is in terms of your quality of life? 4   How WORRIED/DISTRESSED are you about your current sleep pattern? 4   To what extent do you consider your sleep problem to INTERFERE with your daily fuctioning(e.g. daytime fatigue, mood, ability to function at work/daily chores, concentration, mood,etc.) CURRENTLY? 4   INSOMNIA SEVERITY INDEX TOTAL SCORE 22    Absence of insomnia (0-7); sub-threshold insomnia (8-14); moderate insomnia (15-21); and severe insomnia (22-28)       Interventions     Strategies and recommendations including Stimulus control therapy, Sleep hygiene training and Depression and sleep were discussed today.       Vital Signs     Ht 1.803 m (5' 11\")   Wt 84.8 kg (187 lb)   BMI 26.08 kg/m       Mental Status     Orientation:  X3  Mood:  normal and anxious  Affect:  Congruent with mood  Speech/Language:  Normal  Thought Process: Intact  Associations:  Normal  Thought Content: Normal  Patient does not report any suicidal ideation, intention or plan.  Patient reports he has adequate support, does not use alcohol and has no prior history of suicide attempts.    Diagnostic Impressions and Plan        Chronic insomnia  Cyclothymic disorder    Patient reports challenges in adhering to sleep hygiene regimen.  He reflects that sleep and behavioral adherence is affected by cyclic mood changes with periods of increased activation, anxiety and insomnia followed by low " mood and destabilization of sleep-wake pattern.    Plan:  Follow-up with primary care physician to consider refill of quetiapine which patient states has helped both with improving sleep quality and insomnia and with mood.  Keep April 2023 appointment for psychiatry consultation, we will provide other psychiatric resources for patient to consider in the meantime.    Follow-up: as needed      Greg Fry, Claire, LP, DBSM  Diplomate, Behavioral Sleep Medicine  [facility]      Note: This dictation was created using voice recognition software. This document may contain an error not identified before finalizing the document. If the error changes the accuracy of the document, I would appreciate it being brought to my attention.

## 2023-02-09 ENCOUNTER — VIRTUAL VISIT (OUTPATIENT)
Dept: SLEEP MEDICINE | Facility: CLINIC | Age: 28
End: 2023-02-09
Payer: COMMERCIAL

## 2023-02-09 VITALS — WEIGHT: 189 LBS | HEIGHT: 71 IN | BODY MASS INDEX: 26.46 KG/M2

## 2023-02-09 DIAGNOSIS — F51.04 CHRONIC INSOMNIA: Primary | ICD-10-CM

## 2023-02-09 PROCEDURE — 90832 PSYTX W PT 30 MINUTES: CPT | Mod: VID | Performed by: PSYCHOLOGIST

## 2023-02-09 ASSESSMENT — SLEEP AND FATIGUE QUESTIONNAIRES
HOW LIKELY ARE YOU TO NOD OFF OR FALL ASLEEP WHILE SITTING INACTIVE IN A PUBLIC PLACE: WOULD NEVER DOZE
HOW LIKELY ARE YOU TO NOD OFF OR FALL ASLEEP WHILE SITTING AND READING: WOULD NEVER DOZE
HOW LIKELY ARE YOU TO NOD OFF OR FALL ASLEEP WHILE SITTING QUIETLY AFTER LUNCH WITHOUT ALCOHOL: WOULD NEVER DOZE
HOW LIKELY ARE YOU TO NOD OFF OR FALL ASLEEP WHILE WATCHING TV: WOULD NEVER DOZE
HOW LIKELY ARE YOU TO NOD OFF OR FALL ASLEEP IN A CAR, WHILE STOPPED FOR A FEW MINUTES IN TRAFFIC: WOULD NEVER DOZE
HOW LIKELY ARE YOU TO NOD OFF OR FALL ASLEEP WHILE LYING DOWN TO REST IN THE AFTERNOON WHEN CIRCUMSTANCES PERMIT: MODERATE CHANCE OF DOZING
HOW LIKELY ARE YOU TO NOD OFF OR FALL ASLEEP WHILE SITTING AND TALKING TO SOMEONE: WOULD NEVER DOZE
HOW LIKELY ARE YOU TO NOD OFF OR FALL ASLEEP WHEN YOU ARE A PASSENGER IN A CAR FOR AN HOUR WITHOUT A BREAK: WOULD NEVER DOZE

## 2023-02-09 NOTE — PROGRESS NOTES
Video-Visit Details    Type of service:  Video Visit    Video Start Time (time video started): 2:02 PM    Video End Time (time video stopped): 2:19 PM    Originating Location (pt. Location): Home    Distant Location (provider location):  Off-site    Mode of Communication:  Video Conference via [facility]    SLEEP [facility] VIRTUAL VIDEO FOLLOW-UP VISIT  Sleep Psychology    Patient Name: Jackie Medeiros  MRN:  [id number]  Date of Service: Feb 9, 2023       Subjective Report     Jackie Medeiros  returns for a telehealth video visit to discuss progress in implementing behavioral strategies for the management of insomnia.  Patient consent for initiation of video visit was obtained and documented prior to initiation of visit.     Jackie reports he did not follow-up with his primary care phyisician to review use of quetiapine.  He was not able to get in so he went to urgent care bu could not get a prescription there.    In the meantime his sleep has improved. He is getting up at 7 am.  He is getting into bed about :9:30 PM.  .     .  He reports he feels hopeful about being able to use the tools offered during consultation as he plans to travel back to see his family in [place].  We did discuss the impact of jet lag and how to manage.  Reiterated the importance of following up with primary care for continued treatment of cyclothymia and getting refill on prescription of quetiapine which she states has been helpful for mood and for acute bouts of insomnia.     Sleep Data:     Source of Sleep Estimates:  Verbal Self-report    Average Time in Bed: 8 hours  Average Total Sleep Time: 7 hrs  Sleep Efficiency: 80-85%    EPWORTH SLEEPINESS SCALE    Sitting and reading 0   Watching TV 0   Sitting, inactive in a public place (theatre or mtg.) 0    As a passenger in a car 0   Lying down to rest in the afternoon when circumstance permit 2   Sitting and talking to someone 0   Sitting quietly after lunch without alcohol 0   In a  "car, while stopped for a few minutes in traffic 0   TOTAL SCORE (nl <11) 2     INSOMNIA SEVERITY INDEX     Difficulty falling asleep 3   Difficult staying asleep 2   Problems waking up to early 2   How SATISFIED/DISSATISFIED are you with your CURRENT sleep pattern? 3   How NOTICEABLE to others do you think your sleep pattern is in terms of your quality of life? 2   How WORRIED/DISTRESSED are you about your current sleep pattern? 2   To what extent do you consider your sleep problem to INTERFERE with your daily fuctioning(e.g. daytime fatigue, mood, ability to function at work/daily chores, concentration, mood,etc.) CURRENTLY? 2   INSOMNIA SEVERITY INDEX TOTAL SCORE 16    Absence of insomnia (0-7); sub-threshold insomnia (8-14); moderate insomnia (15-21); and severe insomnia (22-28)       Interventions     Strategies and recommendations including Stimulus control therapy and Depression and sleep were reviewed and discussed today.     Services provided are compliant with the requirements of [place] Statute SS 256B.0625 Subd. 3b and paragraph (b)       Vital Signs     Ht 1.803 m (5' 11\")   Wt 85.7 kg (189 lb)   BMI 26.36 kg/m       Mental Status     Orientation:  X3  Mood:  normal  Affect:  Congruent with mood  Speech/Language:  Normal  Thought Process: Intact  Associations:  Normal  Thought Content: Normal  Patient does not report any suicidal ideation, intention or plan.    Diagnostic Impressions and Plan     Chronic insomnia    Patient reports that his sleep quality is improved.  Symptoms remain marginally in the moderate range of severity.  He states that his mood is also improved.  He has not followed up with his primary care physician to obtain prescription for quetiapine which she has used to promote stability of sleep and mood.  He agreed to follow-up as was recommended.  He feels that that he has the tools to maintain a consistent sleep schedule and is noticing reduced sleep latency of less than 30 minutes " and reduced middle of the night awakening    Plan:  Continue current sleep schedule and plan , Follow-up with primary care physician for treatment of cyclothymia and prescription renewal for quetiapine which she has found helpful in mood stabilization and for treatment of insomnia.    Follow-up: as needed      Greg Fry, Claire, LP, [facility]  Diplomate, Behavioral Sleep Medicine  [facility]      Note: This dictation was created using voice recognition software. This document may contain an error not identified before finalizing the document. If the error changes the accuracy of the document, I would appreciate it being brought to my attention.                             .

## 2023-02-09 NOTE — NURSING NOTE
Is the patient currently in the state of [place]? YES    Visit mode:VIDEO    If the visit is dropped, the patient can be reconnected by: VIDEO VISIT: Text to cell phone: [phone]    Will anyone else be joining the visit? NO      How would you like to obtain your AVS? MyChart    Are changes needed to the allergy or medication list? NO    Comments or concerns regarding today's visit: feels like sleep problems have been more present lately, within the last 2 weeks

## 2023-02-10 ENCOUNTER — OFFICE VISIT (OUTPATIENT)
Dept: FAMILY MEDICINE | Facility: CLINIC | Age: 28
End: 2023-02-10
Payer: COMMERCIAL

## 2023-02-10 VITALS
HEART RATE: 69 BPM | OXYGEN SATURATION: 97 % | HEIGHT: 70 IN | RESPIRATION RATE: 16 BRPM | BODY MASS INDEX: 27.77 KG/M2 | SYSTOLIC BLOOD PRESSURE: 124 MMHG | DIASTOLIC BLOOD PRESSURE: 79 MMHG | WEIGHT: 194 LBS | TEMPERATURE: 97.7 F

## 2023-02-10 DIAGNOSIS — Z11.3 SCREEN FOR STD (SEXUALLY TRANSMITTED DISEASE): ICD-10-CM

## 2023-02-10 DIAGNOSIS — R45.1 AGITATION: ICD-10-CM

## 2023-02-10 DIAGNOSIS — F51.01 PRIMARY INSOMNIA: ICD-10-CM

## 2023-02-10 DIAGNOSIS — G47.00 INSOMNIA, UNSPECIFIED TYPE: Primary | ICD-10-CM

## 2023-02-10 PROCEDURE — 87389 HIV-1 AG W/HIV-1&-2 AB AG IA: CPT | Performed by: PHYSICIAN ASSISTANT

## 2023-02-10 PROCEDURE — 86696 HERPES SIMPLEX TYPE 2 TEST: CPT | Performed by: PHYSICIAN ASSISTANT

## 2023-02-10 PROCEDURE — 99214 OFFICE O/P EST MOD 30 MIN: CPT | Performed by: PHYSICIAN ASSISTANT

## 2023-02-10 PROCEDURE — 87591 N.GONORRHOEAE DNA AMP PROB: CPT | Performed by: PHYSICIAN ASSISTANT

## 2023-02-10 PROCEDURE — 86803 HEPATITIS C AB TEST: CPT | Performed by: PHYSICIAN ASSISTANT

## 2023-02-10 PROCEDURE — 86780 TREPONEMA PALLIDUM: CPT | Performed by: PHYSICIAN ASSISTANT

## 2023-02-10 PROCEDURE — 86695 HERPES SIMPLEX TYPE 1 TEST: CPT | Performed by: PHYSICIAN ASSISTANT

## 2023-02-10 PROCEDURE — 93000 ELECTROCARDIOGRAM COMPLETE: CPT | Performed by: PHYSICIAN ASSISTANT

## 2023-02-10 PROCEDURE — 36415 COLL VENOUS BLD VENIPUNCTURE: CPT | Performed by: PHYSICIAN ASSISTANT

## 2023-02-10 PROCEDURE — 87491 CHLMYD TRACH DNA AMP PROBE: CPT | Performed by: PHYSICIAN ASSISTANT

## 2023-02-10 RX ORDER — QUETIAPINE FUMARATE 25 MG/1
12.5-25 TABLET, FILM COATED ORAL
Qty: 90 TABLET | Refills: 0 | Status: SHIPPED | OUTPATIENT
Start: 2023-02-10 | End: 2024-04-26

## 2023-02-10 ASSESSMENT — PAIN SCALES - GENERAL: PAINLEVEL: NO PAIN (0)

## 2023-02-10 NOTE — PROGRESS NOTES
Assessment and Plan:     (G47.00) Insomnia, unspecified type  (primary encounter diagnosis)  Comment: following with therapist for sleep issues, has used seroquel in the past with success, will get EKG to asses QT interval  Plan: Adult Mental Health  Referral, EKG         12-lead complete w/read - Clinics  I think he would also benefit from seeing psychiatry, he has history of cyclothymia, I wonder if he could have bipolar, he notes mood is cyclical and he often feels like he has more energy than most people and is agitated easily, speech is a bit pressured today, he notes he has been told he is bipolar in the past though I don't see an official diagnosis  He is upbeat and denies any SI or HI, sounds like he has good family support     (F51.01) Primary insomnia  Comment: has used seroquel w/good results in the past  Plan: QUEtiapine (SEROQUEL) 25 MG tablet    (R45.1) Agitation  Comment:   Plan: QUEtiapine (SEROQUEL) 25 MG tablet, Adult         Mental Health  Referral    (Z11.3) Screen for STD (sexually transmitted disease)  Comment: asymptomatic   Plan: HIV Antigen Antibody Combo, Hepatitis C Screen         Reflex to HCV RNA Quant and Genotype, Treponema        Abs w Reflex to RPR and Titer, NEISSERIA         GONORRHOEA PCR, CHLAMYDIA TRACHOMATIS PCR,         Herpes Simplex Virus 1 and 2 IgG              Christine Brewster PA-C  30 minutes on the day of the encounter doing chart review, history and exam, documentation and further activities as noted above.        Jayleen Mejia is a 28 year old presenting for the following health issues:  Medication Question      History of Present Illness       Reason for visit:  Sleep issues,need lab work,flu issues    He eats 0-1 servings of fruits and vegetables daily.He consumes 5 sweetened beverage(s) daily.He exercises with enough effort to increase his heart rate 60 or more minutes per day.  He exercises with enough effort to increase his heart  "rate 4 days per week. He is missing 2 dose(s) of medications per week.     He notes he is often feels like his energy is higher than most people and gets agitated easily  He notes his sleeping issues wans and wane  He denies SI or HI  He exercises regularly  He drinks two cups of tea per day, no other caffeine   He does not use drugs or etoh  His wife is in [place] currently, he is caring for 3 of his 4 children with the help of his parents    He would also like to be screened for STDs today  He denies any symptoms--no dysuria, fever/chills, urethral discharge     Review of Systems   See above      Objective      /79 (BP Location: Right arm, Patient Position: Sitting, Cuff Size: Adult Regular)   Pulse 69   Temp 97.7  F (36.5  C) (Temporal)   Resp 16   Ht 1.778 m (5' 10\")   Wt 88 kg (194 lb)   SpO2 97%   BMI 27.84 kg/m      Physical Exam     GENERAL: in NAD, very pleasant, speech is a bit pressured  RESP: lungs clear to auscultation - no rales, no rhonchi, no wheezes  CV: regular rates and rhythm, normal S1 S2, no S3 or S4 and no murmur, no click or rub   MS: extremities- no gross deformities noted, no edema                "

## 2023-02-10 NOTE — PATIENT INSTRUCTIONS
EKG today    Follow-up with mental health    Start seroquel at bedtime     Follow-up with Dr. An in next 2-3 months

## 2023-02-10 NOTE — RESULT ENCOUNTER NOTE
Dear Jackie,     Your recent EKG looked fine.    Please let us know if you have any questions or concerns.    Regards,  Christine Brewster PA-C

## 2023-02-11 LAB
C TRACH DNA SPEC QL NAA+PROBE: NEGATIVE
HCV AB SERPL QL IA: NONREACTIVE
HIV 1+2 AB+HIV1 P24 AG SERPL QL IA: NONREACTIVE
N GONORRHOEA DNA SPEC QL NAA+PROBE: NEGATIVE
T PALLIDUM AB SER QL: NONREACTIVE

## 2023-02-13 LAB
HSV1 IGG SERPL QL IA: 48.4 INDEX
HSV1 IGG SERPL QL IA: ABNORMAL
HSV2 IGG SERPL QL IA: 0.1 INDEX
HSV2 IGG SERPL QL IA: ABNORMAL

## 2023-02-14 NOTE — RESULT ENCOUNTER NOTE
Dear Jackie,     Here are your recent STD screening results which are negative aside from herpes virus-1 which is the virus that typically causes cold sores.    Please let us know if you have any questions or concerns.    Regards,  Christine Brewster PA-C

## 2023-02-15 ENCOUNTER — DOCUMENTATION ONLY (OUTPATIENT)
Dept: PHARMACY | Facility: CLINIC | Age: 28
End: 2023-02-15
Payer: COMMERCIAL

## 2023-02-16 NOTE — PROGRESS NOTES
We have attempted to contact this patient several times to discuss eradication testing for H pylori. Contacts were made via phone, MyChart, and letter. We will no longer continue to reach out to this patient. Please refer back to MTM if you believe this patient would benefit from our services.     Thank you!    Natalya MontielD, BCACP  MTM Pharmacist   [facility]

## 2023-02-20 ENCOUNTER — OFFICE VISIT (OUTPATIENT)
Dept: FAMILY MEDICINE | Facility: CLINIC | Age: 28
End: 2023-02-20
Payer: COMMERCIAL

## 2023-02-20 VITALS
TEMPERATURE: 98.2 F | SYSTOLIC BLOOD PRESSURE: 111 MMHG | DIASTOLIC BLOOD PRESSURE: 73 MMHG | HEART RATE: 76 BPM | OXYGEN SATURATION: 96 % | WEIGHT: 196.8 LBS | RESPIRATION RATE: 16 BRPM | HEIGHT: 70 IN | BODY MASS INDEX: 28.18 KG/M2

## 2023-02-20 DIAGNOSIS — Z71.84 TRAVEL ADVICE ENCOUNTER: Primary | ICD-10-CM

## 2023-02-20 PROCEDURE — 90686 IIV4 VACC NO PRSV 0.5 ML IM: CPT | Performed by: NURSE PRACTITIONER

## 2023-02-20 PROCEDURE — 90472 IMMUNIZATION ADMIN EACH ADD: CPT | Performed by: NURSE PRACTITIONER

## 2023-02-20 PROCEDURE — 90619 MENACWY-TT VACCINE IM: CPT | Performed by: NURSE PRACTITIONER

## 2023-02-20 PROCEDURE — 90691 TYPHOID VACCINE IM: CPT | Performed by: NURSE PRACTITIONER

## 2023-02-20 PROCEDURE — 90471 IMMUNIZATION ADMIN: CPT | Performed by: NURSE PRACTITIONER

## 2023-02-20 PROCEDURE — 90715 TDAP VACCINE 7 YRS/> IM: CPT | Performed by: NURSE PRACTITIONER

## 2023-02-20 PROCEDURE — 99402 PREV MED CNSL INDIV APPRX 30: CPT | Mod: 25 | Performed by: NURSE PRACTITIONER

## 2023-02-20 RX ORDER — ATOVAQUONE AND PROGUANIL HYDROCHLORIDE 250; 100 MG/1; MG/1
1 TABLET, FILM COATED ORAL DAILY
Qty: 74 TABLET | Refills: 0 | Status: SHIPPED | OUTPATIENT
Start: 2023-02-20 | End: 2024-04-26

## 2023-02-20 RX ORDER — AZITHROMYCIN 500 MG/1
500 TABLET, FILM COATED ORAL DAILY
Qty: 3 TABLET | Refills: 0 | Status: SHIPPED | OUTPATIENT
Start: 2023-02-20 | End: 2023-02-23

## 2023-02-20 ASSESSMENT — PAIN SCALES - GENERAL: PAINLEVEL: NO PAIN (0)

## 2023-02-20 NOTE — NURSING NOTE
Prior to immunization administration, verified patients identity using patient s name and date of birth. Please see Immunization Activity for additional information.     Screening Questionnaire for Adult Immunization    Are you sick today?   No   Do you have allergies to medications, food, a vaccine component or latex?   No   Have you ever had a serious reaction after receiving a vaccination?   No   Do you have a long-term health problem with heart, lung, kidney, or metabolic disease (e.g., diabetes), asthma, a blood disorder, no spleen, complement component deficiency, a cochlear implant, or a spinal fluid leak?  Are you on long-term aspirin therapy?   No   Do you have cancer, leukemia, HIV/AIDS, or any other immune system problem?   No   Do you have a parent, brother, or sister with an immune system problem?   No   In the past 3 months, have you taken medications that affect  your immune system, such as prednisone, other steroids, or anticancer drugs; drugs for the treatment of rheumatoid arthritis, Crohn s disease, or psoriasis; or have you had radiation treatments?   No   Have you had a seizure, or a brain or other nervous system problem?   No   During the past year, have you received a transfusion of blood or blood    products, or been given immune (gamma) globulin or antiviral drug?   No   For women: Are you pregnant or is there a chance you could become       pregnant during the next month?   No   Have you received any vaccinations in the past 4 weeks?   No     Immunization questionnaire answers were all negative.        Per orders of Dr. Wynne, injection of Typhoid, Meningitis, Flu, TDAP given by Meri Solis RN. Patient instructed to remain in clinic for 15 minutes afterwards, and to report any adverse reaction to me immediately.       Screening performed by Meri Solis RN on 2/20/2023 at 4:46 PM.

## 2023-02-20 NOTE — PROGRESS NOTES
"Nurse Note ( Pre-Travel Consult)      Itinerary:  [facility]      Departure Date: 3/15/2023      Return Date: 6/17/2023      Length of Trip 3 months      Reason for Travel: Visiting friends and relatives           Urban or rural: urban      Accommodations: Family home        IMMUNIZATION HISTORY  Have you received any immunizations within the past 4 weeks?  No  Have you ever fainted from having your blood drawn or from an injection?  No  Have you ever had a fever reaction to vaccination?  Yes - but had several vaccines at once  Have you ever had any bad reaction or side effect from any vaccination?  see above  Have you ever had hepatitis A or B vaccine?  Yes  Do you live (or work closely) with anyone who has AIDS, an AIDS-like condition, any other immune disorder or who is on chemotherapy for cancer?  No  Do you have a family history of immunodeficiency?  No  Have you received any injection of immune globulin or any blood products during the past 12 months?  No    Patient roomed by Meri Solis RN      Subjective  Jackie Medeiros is a 28 year old male seen today alone for counsultation for international travel.   Patient will be departing in  3 week(s) and  traveling alone.      Patient itinerary :  will be in the urban  region of [facility] and [facility] which risk for Malaria and Yellow Fever. exposure.      Patient's activities will include visiting friends and relatives.    Patient's country of birth is [place]    Special medical concerns: none  Pre-travel questionnaire was completed by patient and reviewed by provider.     Vitals: /73   Pulse 76   Temp 98.2  F (36.8  C) (Temporal)   Resp 16   Ht 1.778 m (5' 10\")   Wt 89.3 kg (196 lb 12.8 oz)   SpO2 96%   BMI 28.24 kg/m    BMI= Body mass index is 28.24 kg/m .    EXAM:  General:  Well-nourished, well-developed in no acute distress.  Appears to be stated age, interacts appropriately and expresses understanding of information given to patient.    Current " Outpatient Medications   Medication Sig Dispense Refill     Biotin 5000 MCG CAPS Take 1 capsule by mouth daily       Cholecalciferol (VITAMIN D-3) 125 MCG (5000 UT) TABS Take 5,000 Units by mouth daily       ibuprofen (ADVIL/MOTRIN) 800 MG tablet Take 1 tablet (800 mg) by mouth every 8 hours as needed 30 tablet 8     Magnesium Oxide 250 MG TABS Take 2 capsules by mouth daily       Omega-3 Fatty Acids (OMEGA-3 FISH OIL PO) Take 2 g by mouth daily       pantoprazole (PROTONIX) 40 MG EC tablet Take 1 tablet (40 mg) by mouth daily 90 tablet 0     QUEtiapine (SEROQUEL) 25 MG tablet Take 0.5-1 tablets (12.5-25 mg) by mouth nightly as needed (for insomnia or for agitation) 90 tablet 0     tretinoin (RETIN-A) 0.025 % external cream Use every night on clean skin, use a pea sized amount. 45 g 3     Zinc 30 MG TABS Take 1 tablet by mouth daily       Patient Active Problem List   Diagnosis     GERD (gastroesophageal reflux disease)     Abnormal laboratory test     Environmental allergies     Hyperlipidemia LDL goal <130     Anemia     Vitamin D deficiency     Allergies   Allergen Reactions     Dust Mites      Minocycline Hives         Immunizations discussed include:   Covid 19: Up to date  Hepatitis A:  Up to date  Hepatitis B: Up to date  Influenza: Ordered/given today, risks, benefits and side effects reviewed  Typhoid: Ordered/given today, risks, benefits and side effects reviewed  Rabies: Declined  reviewed managment of a animal bite or scratch (washing wound, seek medical care within 24 hours for post exposure prophylaxis )  Yellow Fever: Up to date replacement card today   Japanese Encephalitis: Not indicated  Meningococcus: Ordered/given today, risks, benefits and side effects reviewed  Tetanus/Diphtheria: Ordered/given today, risks, benefits and side effects reviewed  Measles/Mumps/Rubella: Up to date  Cholera: Not needed  Polio: Up to date  Pneumococcal: Under age of 65  Varicella: Up to date  Shingrix: Not  indicated  HPV:  Up to date     TB: low risk    Altitude Exposure on this trip: no  Past tolerance to Altitude: na    ASSESSMENT/PLAN:  Jackie was seen today for travel clinic.    Diagnoses and all orders for this visit:    Travel advice encounter  -     atovaquone-proguanil (MALARONE) 250-100 MG tablet; Take 1 tablet by mouth daily Start 2 days before exposure to Malaria and continue daily till  7 days after exposure.  -     azithromycin (ZITHROMAX) 500 MG tablet; Take 1 tablet (500 mg) by mouth daily for 3 doses Take 1 tablet a day for up to 3 days for severe diarrhea    Other orders  -     TYPHOID VACCINE, IM  -     INFLUENZA VACCINE >6 MONTHS (AFLURIA/FLUZONE)  -     MENINGOCOCCAL ACWY 2-55Y (MENQUADFI )  -     TDAP VACCINE (Adacel, Boostrix)  [[id number]]      I have reviewed general recommendations for safe travel   including: food/water precautions, insect precautions, safer sex   practices given high prevalence of Zika, HIV and other STDs,   roadway safety. Educational materials and [employer] report provided.    Malaraia prophylaxis recommended: Malarone  Symptomatic treatment for traveler's diarrhea: azithromycin    Personal protective measures reviewed including hand sanitizing and contact precautions for the prevention of viral illnesses. Cover coughs and masking  during travel and upon return.  Current COVID 19 pandemic.   Monitor / follow current CDC guidelines.    Evacuation insurance advised and resources were provided to patient.    Total visit time 30 minutes  with over 50% of time spent counseling patient and shared decision making as detailed above.    Xiomy Wynne CNP  Certificate in Travel Health

## 2023-02-20 NOTE — PATIENT INSTRUCTIONS
Thank you for visiting the [facility] : [phone]  Today February 20, 2023 you received the    Flu Vaccine    Tetanus (Tdap) Vaccine    Meningococcal (Menactra) Vaccine    Typhoid - injectable. This vaccine is valid for two years.     Follow up vaccine appointments can be made as a NURSE ONLY visit at the Travel Clinic, (BE PREPARED TO WAIT, ) or at designated [facility].    If you are receiving the Rabies vaccines series, it is important that you follow the exact schedule ordered.     Pre-travel     We recommend that you purchase Medical Evacuation Insurance prior to your departure.  Https://wwwnc.cdc.gov/travel/page/insurance    [place] your travel plans with the  Department of State through STEP ( Smart Traveler Enrollment Program ) https://step.state.gov.  STEP is a free service to allow U[place] citizens and nationals traveling and living abroad to enroll their trip with the nearest U.S. Embassy or Consulate.    Animal Exposure: Avoid all mammals even if they look healthy.  If there is a bite, scratch or even a lick, wash area immediately with soap and water for 15 minutes and seek medical care within 24 hours for evaluation of Rabies post exposure treatment.  Contact your Medical Evacuation Insurance.    COVID 19 (Sars Cov2) prevention strategies  Physical distancing: Maintain 6 foot (2m) from others.              Avoid large gatherings and public transportation.   Avoid indoor shopping malls, theaters and restaurants   Practice consistent mask wearing covering the nose, mouth and underneath the chin when unable to maintain 6 foot distance from others.  Hand washing: frequent, thorough handwashing with soap and water for 20 seconds (or using a hand  containing 60% alcohol)   Avoid touching face, nose, eyes, mouth unless you have done appropriate hand washing as above.   Clean high touch surfaces with approved disinfectant against Covid 19  (70%  Ethanol ) or a bleach solution (add 20 mL (4 teaspoons) of bleach to 1 L (1 quart) of water;)  Be careful not to breath or touch bleach.      Travel Covid 19 Testing:  updated 12/06/2021  International travelers: Pre-travel: diagnostic testing (antigen or PCR) may be required for entry:  See country specific Embassy websites or airline websites.    Post travel: CDC recommends getting tested 3-5 days after your trip     COVID-19 testing scheduling number for pre-travel through [facility]  [phone] (Must have an order). Available 24 hours a day.  You can also schedule through My Chart.     Post-travel illness:  Contact your provider or [facility] if you develop a fever, rash, cough, diarrhea or other symptoms for up to 1 year after travel.  Inform your healthcare provider when and where you traveled to.    Please call the [employer] [facility] with any questions [phone]  Or send your provider a 'My Chart' note.

## 2023-05-08 DIAGNOSIS — F51.01 PRIMARY INSOMNIA: ICD-10-CM

## 2023-05-08 DIAGNOSIS — R45.1 AGITATION: ICD-10-CM

## 2023-05-08 RX ORDER — QUETIAPINE FUMARATE 25 MG/1
TABLET, FILM COATED ORAL
Qty: 90 TABLET | Refills: 0 | OUTPATIENT
Start: 2023-05-08

## 2023-05-09 RX ORDER — QUETIAPINE FUMARATE 25 MG/1
12.5-25 TABLET, FILM COATED ORAL
Qty: 60 TABLET | Refills: 0 | Status: SHIPPED | OUTPATIENT
Start: 2023-05-09 | End: 2024-04-26

## 2023-05-09 NOTE — TELEPHONE ENCOUNTER
Please call patient and let him know he needs follow-up as we discussed at his last visit.  I referred to psych.  Did he get a call to arrange appointment?  If not, please give him information.      He should also establish care with a pcp for ongoing refills of medications.  I'll refill seroquel one time, he needs appropriate follow-up for more refills.    Thanks.

## 2023-05-11 NOTE — TELEPHONE ENCOUNTER
Patient Contact    Attempt # 1    Was call answered?  No.  Left message on voicemail with information to call back.    Also sent "[employer]t message to patient     Xiomy BYRD, Triage RN  [facility]

## 2023-05-12 NOTE — TELEPHONE ENCOUNTER
Patient Contact    Attempt # 2    Was call answered? No.    Left message for patient to call triage back.    Rachael Mchugh RN

## 2023-05-15 NOTE — TELEPHONE ENCOUNTER
Patient Contact    Attempt # 3    Was call answered? No.    Left message for patient to call triage back.    Rachael Mchugh RN

## 2023-06-02 ENCOUNTER — HEALTH MAINTENANCE LETTER (OUTPATIENT)
Age: 28
End: 2023-06-02

## 2024-01-23 ENCOUNTER — HOSPITAL ENCOUNTER (EMERGENCY)
Facility: CLINIC | Age: 29
Discharge: HOME | End: 2024-01-23
Attending: EMERGENCY MEDICINE | Admitting: EMERGENCY MEDICINE
Payer: COMMERCIAL

## 2024-01-23 ENCOUNTER — APPOINTMENT (OUTPATIENT)
Dept: CT IMAGING | Facility: CLINIC | Age: 29
End: 2024-01-23
Attending: EMERGENCY MEDICINE
Payer: COMMERCIAL

## 2024-01-23 VITALS
BODY MASS INDEX: 27.92 KG/M2 | SYSTOLIC BLOOD PRESSURE: 104 MMHG | RESPIRATION RATE: 16 BRPM | WEIGHT: 195 LBS | OXYGEN SATURATION: 97 % | TEMPERATURE: 98 F | HEART RATE: 60 BPM | DIASTOLIC BLOOD PRESSURE: 74 MMHG | HEIGHT: 70 IN

## 2024-01-23 DIAGNOSIS — R10.84 GENERALIZED ABDOMINAL PAIN: ICD-10-CM

## 2024-01-23 LAB
ALBUMIN SERPL BCG-MCNC: 4.4 G/DL (ref 3.5–5.2)
ALBUMIN UR-MCNC: NEGATIVE MG/DL
ALP SERPL-CCNC: 91 U/L (ref 40–150)
ALT SERPL W P-5'-P-CCNC: 13 U/L (ref 0–70)
ANION GAP SERPL CALCULATED.3IONS-SCNC: 5 MMOL/L (ref 7–15)
APPEARANCE UR: CLEAR
AST SERPL W P-5'-P-CCNC: 18 U/L (ref 0–45)
BASOPHILS # BLD AUTO: 0 10E3/UL (ref 0–0.2)
BASOPHILS NFR BLD AUTO: 0 %
BILIRUB SERPL-MCNC: 0.4 MG/DL
BILIRUB UR QL STRIP: NEGATIVE
BUN SERPL-MCNC: 7.4 MG/DL (ref 6–20)
CALCIUM SERPL-MCNC: 9.5 MG/DL (ref 8.6–10)
CHLORIDE SERPL-SCNC: 104 MMOL/L (ref 98–107)
COLOR UR AUTO: ABNORMAL
CREAT SERPL-MCNC: 0.74 MG/DL (ref 0.67–1.17)
DEPRECATED HCO3 PLAS-SCNC: 30 MMOL/L (ref 22–29)
EGFRCR SERPLBLD CKD-EPI 2021: >90 ML/MIN/1.73M2
EOSINOPHIL # BLD AUTO: 0.1 10E3/UL (ref 0–0.7)
EOSINOPHIL NFR BLD AUTO: 1 %
ERYTHROCYTE [DISTWIDTH] IN BLOOD BY AUTOMATED COUNT: 12.6 % (ref 10–15)
GLUCOSE SERPL-MCNC: 127 MG/DL (ref 70–99)
GLUCOSE UR STRIP-MCNC: NEGATIVE MG/DL
HCT VFR BLD AUTO: 39.9 % (ref 40–53)
HGB BLD-MCNC: 13.9 G/DL (ref 13.3–17.7)
HGB UR QL STRIP: NEGATIVE
IMM GRANULOCYTES # BLD: 0 10E3/UL
IMM GRANULOCYTES NFR BLD: 0 %
KETONES UR STRIP-MCNC: NEGATIVE MG/DL
LEUKOCYTE ESTERASE UR QL STRIP: NEGATIVE
LIPASE SERPL-CCNC: 20 U/L (ref 13–60)
LYMPHOCYTES # BLD AUTO: 2.7 10E3/UL (ref 0.8–5.3)
LYMPHOCYTES NFR BLD AUTO: 51 %
MCH RBC QN AUTO: 28.6 PG (ref 26.5–33)
MCHC RBC AUTO-ENTMCNC: 34.8 G/DL (ref 31.5–36.5)
MCV RBC AUTO: 82 FL (ref 78–100)
MONOCYTES # BLD AUTO: 0.6 10E3/UL (ref 0–1.3)
MONOCYTES NFR BLD AUTO: 11 %
MUCOUS THREADS #/AREA URNS LPF: PRESENT /LPF
NEUTROPHILS # BLD AUTO: 2 10E3/UL (ref 1.6–8.3)
NEUTROPHILS NFR BLD AUTO: 37 %
NITRATE UR QL: NEGATIVE
NRBC # BLD AUTO: 0 10E3/UL
NRBC BLD AUTO-RTO: 0 /100
PH UR STRIP: 6 [PH] (ref 5–7)
PLATELET # BLD AUTO: 194 10E3/UL (ref 150–450)
POTASSIUM SERPL-SCNC: 3.9 MMOL/L (ref 3.4–5.3)
PROT SERPL-MCNC: 7 G/DL (ref 6.4–8.3)
RBC # BLD AUTO: 4.86 10E6/UL (ref 4.4–5.9)
RBC URINE: 0 /HPF
SODIUM SERPL-SCNC: 139 MMOL/L (ref 135–145)
SP GR UR STRIP: 1.02 (ref 1–1.03)
UROBILINOGEN UR STRIP-MCNC: NORMAL MG/DL
WBC # BLD AUTO: 5.4 10E3/UL (ref 4–11)
WBC URINE: 1 /HPF

## 2024-01-23 PROCEDURE — 83690 ASSAY OF LIPASE: CPT | Performed by: EMERGENCY MEDICINE

## 2024-01-23 PROCEDURE — 250N000011 HC RX IP 250 OP 636: Performed by: EMERGENCY MEDICINE

## 2024-01-23 PROCEDURE — 36415 COLL VENOUS BLD VENIPUNCTURE: CPT | Performed by: EMERGENCY MEDICINE

## 2024-01-23 PROCEDURE — 85025 COMPLETE CBC W/AUTO DIFF WBC: CPT | Performed by: EMERGENCY MEDICINE

## 2024-01-23 PROCEDURE — 250N000013 HC RX MED GY IP 250 OP 250 PS 637: Performed by: EMERGENCY MEDICINE

## 2024-01-23 PROCEDURE — 96374 THER/PROPH/DIAG INJ IV PUSH: CPT | Mod: 59

## 2024-01-23 PROCEDURE — 250N000009 HC RX 250: Performed by: EMERGENCY MEDICINE

## 2024-01-23 PROCEDURE — 99285 EMERGENCY DEPT VISIT HI MDM: CPT | Mod: 25

## 2024-01-23 PROCEDURE — 81001 URINALYSIS AUTO W/SCOPE: CPT | Performed by: EMERGENCY MEDICINE

## 2024-01-23 PROCEDURE — 82040 ASSAY OF SERUM ALBUMIN: CPT | Performed by: EMERGENCY MEDICINE

## 2024-01-23 PROCEDURE — 74177 CT ABD & PELVIS W/CONTRAST: CPT

## 2024-01-23 RX ORDER — MAGNESIUM HYDROXIDE/ALUMINUM HYDROXICE/SIMETHICONE 120; 1200; 1200 MG/30ML; MG/30ML; MG/30ML
15 SUSPENSION ORAL ONCE
Status: COMPLETED | OUTPATIENT
Start: 2024-01-23 | End: 2024-01-23

## 2024-01-23 RX ORDER — LIDOCAINE HYDROCHLORIDE 20 MG/ML
15 SOLUTION OROPHARYNGEAL ONCE
Status: COMPLETED | OUTPATIENT
Start: 2024-01-23 | End: 2024-01-23

## 2024-01-23 RX ORDER — ONDANSETRON 2 MG/ML
4 INJECTION INTRAMUSCULAR; INTRAVENOUS ONCE
Status: COMPLETED | OUTPATIENT
Start: 2024-01-23 | End: 2024-01-23

## 2024-01-23 RX ORDER — IOPAMIDOL 755 MG/ML
98 INJECTION, SOLUTION INTRAVASCULAR ONCE
Status: COMPLETED | OUTPATIENT
Start: 2024-01-23 | End: 2024-01-23

## 2024-01-23 RX ORDER — ONDANSETRON 4 MG/1
4 TABLET, ORALLY DISINTEGRATING ORAL EVERY 6 HOURS PRN
Qty: 10 TABLET | Refills: 0 | Status: SHIPPED | OUTPATIENT
Start: 2024-01-23 | End: 2024-04-26

## 2024-01-23 RX ADMIN — LIDOCAINE HYDROCHLORIDE 15 ML: 20 SOLUTION ORAL; TOPICAL at 07:39

## 2024-01-23 RX ADMIN — ALUMINUM HYDROXIDE, MAGNESIUM HYDROXIDE, AND SIMETHICONE 15 ML: 200; 200; 20 SUSPENSION ORAL at 07:39

## 2024-01-23 RX ADMIN — ONDANSETRON 4 MG: 2 INJECTION INTRAMUSCULAR; INTRAVENOUS at 07:37

## 2024-01-23 RX ADMIN — IOPAMIDOL 98 ML: 755 INJECTION, SOLUTION INTRAVENOUS at 09:15

## 2024-01-23 RX ADMIN — SODIUM CHLORIDE 68 ML: 9 INJECTION, SOLUTION INTRAVENOUS at 09:15

## 2024-01-23 ASSESSMENT — ACTIVITIES OF DAILY LIVING (ADL)
ADLS_ACUITY_SCORE: 35
ADLS_ACUITY_SCORE: 33

## 2024-01-23 NOTE — DISCHARGE INSTRUCTIONS
Call your gastroenterologist (I found her name and number) to discuss repeat testing or treatment for H. pylori.  Daily omeprazole.  Pepto-Bismol as needed.  Zofran as needed for nausea or vomiting.  Royal diet.  Return with worsening symptoms or new concerns.

## 2024-01-23 NOTE — ED PROVIDER NOTES
"  History     Chief Complaint:  Abdominal Pain       The history is provided by the patient.      Jackie Medeiros is a 29 year old male with a history of H. pylori who presents alone with abdominal pain.  Yesterday he developed burning abdominal pain which he believes might be recurrent H. pylori.  He has had this pain in the past, most recently July 2022.  He localizes his pain to the umbilicus and describes it as 8 or 9 out of 10 in intensity.  He denies associated vomiting but has had nausea.  He has had poor PO intake as this worsens his pain.  He has had normal bowel movements and denies dysuria or hematuria.  He has not had fever.  He tried Pepto-Bismol without relief.  He otherwise describes mild headache.  He denies cigarette or alcohol use.    Independent Historian:   None - Patient Only    Review of External Notes:   I reviewed a primary care visit for travel advise that was in Feb of 2023.     Medications:    Malarone   Pantoprazole   Quetiapine      Past Medical History:    Hypovitaminosis D  GERD   HLD   Anemia   Insomnia   Cyclothymic disorder     Past Surgical History:    The patient denies pertinent past surgical history.     Physical Exam   Patient Vitals for the past 24 hrs:   BP Temp Temp src Pulse Resp SpO2 Height Weight   01/23/24 0859 104/74 -- -- 60 16 97 % -- --   01/23/24 0612 124/68 98  F (36.7  C) Oral 73 14 99 % 1.778 m (5' 10\") 88.5 kg (195 lb)        Physical Exam  General: Well-developed and well-nourished. Well appearing young man. Cooperative.  Head:  Atraumatic.  Eyes:  Conjunctivae, lids, and sclerae are normal.  ENT:    Normal nose. Moist mucous membranes.  Neck:  Supple. Normal range of motion.  CV:  Regular rate and rhythm. Normal heart sounds with no murmurs, rubs, or gallops detected.  Resp:  No respiratory distress. Clear to auscultation bilaterally without decreased breath sounds, wheezing, rales, or rhonchi.  GI:  Soft. Non-distended.Tenderness in the bilateral lower " quadrants. No epigastric or RUQ tenderness.   MS:  Normal ROM.   Skin:  Warm. Non-diaphoretic. No pallor.  Neuro:  Awake. A&Ox3. Normal strength.  Psych: Normal mood and affect. Normal speech.  Vitals reviewed.    Emergency Department Course   Imaging:  CT Abdomen Pelvis w Contrast  Preliminary Result  IMPRESSION: No specific acute abnormality identified.     Results per radiology     Laboratory:  Labs Ordered and Resulted from Time of ED Arrival to Time of ED Departure   COMPREHENSIVE METABOLIC PANEL - Abnormal       Result Value    Sodium 139      Potassium 3.9      Carbon Dioxide (CO2) 30 (*)     Anion Gap 5 (*)     Urea Nitrogen 7.4      Creatinine 0.74      GFR Estimate >90      Calcium 9.5      Chloride 104      Glucose 127 (*)     Alkaline Phosphatase 91      AST 18      ALT 13      Protein Total 7.0      Albumin 4.4      Bilirubin Total 0.4     ROUTINE UA WITH MICROSCOPIC REFLEX TO CULTURE - Abnormal    Color Urine Light Yellow      Appearance Urine Clear      Glucose Urine Negative      Bilirubin Urine Negative      Ketones Urine Negative      Specific Gravity Urine 1.022      Blood Urine Negative      pH Urine 6.0      Protein Albumin Urine Negative      Urobilinogen Urine Normal      Nitrite Urine Negative      Leukocyte Esterase Urine Negative      Mucus Urine Present (*)     RBC Urine 0      WBC Urine 1     CBC WITH PLATELETS AND DIFFERENTIAL - Abnormal    WBC Count 5.4      RBC Count 4.86      Hemoglobin 13.9      Hematocrit 39.9 (*)     MCV 82      MCH 28.6      MCHC 34.8      RDW 12.6      Platelet Count 194      % Neutrophils 37      % Lymphocytes 51      % Monocytes 11      % Eosinophils 1      % Basophils 0      % Immature Granulocytes 0      NRBCs per 100 WBC 0      Absolute Neutrophils 2.0      Absolute Lymphocytes 2.7      Absolute Monocytes 0.6      Absolute Eosinophils 0.1      Absolute Basophils 0.0      Absolute Immature Granulocytes 0.0      Absolute NRBCs 0.0     LIPASE - Normal    Lipase  20        Emergency Department Course & Assessments:    Interventions:  0737 Zofran 4 mg IV  0739 Maalox 15 ml PO  0739 Xylocaine 15 ml PO     Assessments:  0718 I obtained history and examined the patient as noted above.  0900 I rechecked the patient and updated. His pain is now 6/10, but is  on exam, so he is agreeable to undergo a CT.   0958 I rechecked the patient and explained findings. I discussed plan for discharge home.    Independent Interpretation (X-rays, CTs, rhythm strip):  I reviewed the CT Abdomen which did not show any evidence of appendicitis.     Consultations/Discussion of Management or Tests:  Not applicable    Social Determinants of Health affecting care:   Employed      Disposition:  The patient was discharged.     Impression & Plan    Medical Decision Making:  Jackie is a 29-year-old man who describes burning lower and periumbilical abdominal pain which he is concerned may be recurrent H. pylori.  He has nausea without vomiting or any other concerns.  He is well-appearing on exam.  He does not have epigastric tenderness and actually has tenderness in the lower quadrants.  He felt mildly improved with a GI cocktail and Zofran but had persistent tenderness on exam.  As such, he was agreeable to CT.    Fortunately, CT of the abdomen and pelvis does not reveal acute pathology to explain his pain.  Laboratory studies are reassuring without kidney injury, electrolyte derangement, hepatitis, biliary obstruction, pancreatitis, UTI, leukocytosis, or anemia.    Given his reassuring workup, he is appropriate for discharge.  I will start him back on an antacid as he describes his pain as burning but its location is somewhat unusual for gastritis.  He does have a history of H. pylori so I recommended he follow-up with his gastroenterologist.  He is concerned he may develop worsening and I encouraged him to return if this occurs.  In the meantime he will use Zofran as needed for nausea and  vomiting and eat a bland diet.  All questions answered.  Amenable to discharge.    Diagnosis:    ICD-10-CM    1. Generalized abdominal pain  R10.84            Discharge Medications:  New Prescriptions    OMEPRAZOLE (PRILOSEC) 20 MG DR CAPSULE    Take 1 capsule (20 mg) by mouth daily for 30 days    ONDANSETRON (ZOFRAN ODT) 4 MG ODT TAB    Take 1 tablet (4 mg) by mouth every 6 hours as needed for nausea or vomiting        Scribe Disclosure:  I, Matt Garcia, am serving as a scribe at 7:16 AM on 1/23/2024 to document services personally performed by Aleisha Earl MD based on my observations and the provider's statements to me.   1/23/2024   Aleisha Earl MD Dixson, Kylie S, MD  01/28/24 1558

## 2024-01-23 NOTE — ED TRIAGE NOTES
Patient states abdominal pain.  Nausea.  Started yesterday.     Triage Assessment (Adult)       Row Name 01/23/24 0613          Triage Assessment    Airway WDL WDL        Respiratory WDL    Respiratory WDL WDL        Cognitive/Neuro/Behavioral WDL    Cognitive/Neuro/Behavioral WDL WDL

## 2024-04-10 NOTE — LETTER
[facility]  [address]  [place] [address]  [phone]  Dept: [phone]      11/21/2020    Re: Jackie Medeiros      TO WHOM IT MAY CONCERN:    Jackie Medeiros  was seen on 11/21/20.  Please excuse him until Monday, November 23rd when he will work on following up with his primary care provider due to right shoulder pain.    Anai Jiang, APRN, CNP   CS Urgent Care Provider  [facility]   Type 2 diabetes mellitus

## 2024-04-26 ENCOUNTER — ANCILLARY PROCEDURE (OUTPATIENT)
Dept: GENERAL RADIOLOGY | Facility: CLINIC | Age: 29
End: 2024-04-26
Attending: INTERNAL MEDICINE
Payer: COMMERCIAL

## 2024-04-26 ENCOUNTER — OFFICE VISIT (OUTPATIENT)
Dept: FAMILY MEDICINE | Facility: CLINIC | Age: 29
End: 2024-04-26
Payer: COMMERCIAL

## 2024-04-26 VITALS
HEART RATE: 77 BPM | HEIGHT: 71 IN | DIASTOLIC BLOOD PRESSURE: 71 MMHG | WEIGHT: 192 LBS | BODY MASS INDEX: 26.88 KG/M2 | RESPIRATION RATE: 20 BRPM | OXYGEN SATURATION: 100 % | TEMPERATURE: 97.6 F | SYSTOLIC BLOOD PRESSURE: 96 MMHG

## 2024-04-26 DIAGNOSIS — J30.2 SEASONAL ALLERGIES: ICD-10-CM

## 2024-04-26 DIAGNOSIS — R10.9 FLANK PAIN: ICD-10-CM

## 2024-04-26 DIAGNOSIS — M54.50 BILATERAL LOW BACK PAIN WITHOUT SCIATICA, UNSPECIFIED CHRONICITY: ICD-10-CM

## 2024-04-26 DIAGNOSIS — Z00.00 ROUTINE GENERAL MEDICAL EXAMINATION AT A HEALTH CARE FACILITY: Primary | ICD-10-CM

## 2024-04-26 DIAGNOSIS — D64.9 ANEMIA, UNSPECIFIED TYPE: ICD-10-CM

## 2024-04-26 DIAGNOSIS — E78.5 HYPERLIPIDEMIA LDL GOAL <130: ICD-10-CM

## 2024-04-26 DIAGNOSIS — L65.9 HAIR LOSS: ICD-10-CM

## 2024-04-26 DIAGNOSIS — K21.9 GASTROESOPHAGEAL REFLUX DISEASE, UNSPECIFIED WHETHER ESOPHAGITIS PRESENT: ICD-10-CM

## 2024-04-26 DIAGNOSIS — E55.9 VITAMIN D DEFICIENCY: ICD-10-CM

## 2024-04-26 PROBLEM — G47.00 INSOMNIA: Status: ACTIVE | Noted: 2017-08-15

## 2024-04-26 PROBLEM — F34.0 CYCLOTHYMIC DISORDER: Status: ACTIVE | Noted: 2017-07-18

## 2024-04-26 LAB
ALBUMIN UR-MCNC: NEGATIVE MG/DL
APPEARANCE UR: CLEAR
BACTERIA #/AREA URNS HPF: ABNORMAL /HPF
BILIRUB UR QL STRIP: NEGATIVE
CHOLEST SERPL-MCNC: 206 MG/DL
COLOR UR AUTO: YELLOW
FASTING STATUS PATIENT QL REPORTED: YES
GLUCOSE UR STRIP-MCNC: NEGATIVE MG/DL
HDLC SERPL-MCNC: 44 MG/DL
HGB UR QL STRIP: NEGATIVE
IRON BINDING CAPACITY (ROCHE): 267 UG/DL (ref 240–430)
IRON SATN MFR SERPL: 24 % (ref 15–46)
IRON SERPL-MCNC: 63 UG/DL (ref 61–157)
KETONES UR STRIP-MCNC: NEGATIVE MG/DL
LDLC SERPL CALC-MCNC: 139 MG/DL
LEUKOCYTE ESTERASE UR QL STRIP: NEGATIVE
MUCOUS THREADS #/AREA URNS LPF: PRESENT /LPF
NITRATE UR QL: NEGATIVE
NONHDLC SERPL-MCNC: 162 MG/DL
PH UR STRIP: 5.5 [PH] (ref 5–7)
RBC #/AREA URNS AUTO: ABNORMAL /HPF
SP GR UR STRIP: >=1.03 (ref 1–1.03)
SQUAMOUS #/AREA URNS AUTO: ABNORMAL /LPF
TRIGL SERPL-MCNC: 117 MG/DL
TSH SERPL DL<=0.005 MIU/L-ACNC: 1.74 UIU/ML (ref 0.3–4.2)
UROBILINOGEN UR STRIP-ACNC: 0.2 E.U./DL
VIT D+METAB SERPL-MCNC: 17 NG/ML (ref 20–50)
WBC #/AREA URNS AUTO: ABNORMAL /HPF

## 2024-04-26 PROCEDURE — 36415 COLL VENOUS BLD VENIPUNCTURE: CPT | Performed by: INTERNAL MEDICINE

## 2024-04-26 PROCEDURE — 82306 VITAMIN D 25 HYDROXY: CPT | Performed by: INTERNAL MEDICINE

## 2024-04-26 PROCEDURE — 99395 PREV VISIT EST AGE 18-39: CPT | Performed by: INTERNAL MEDICINE

## 2024-04-26 PROCEDURE — 80061 LIPID PANEL: CPT | Performed by: INTERNAL MEDICINE

## 2024-04-26 PROCEDURE — 81001 URINALYSIS AUTO W/SCOPE: CPT | Performed by: INTERNAL MEDICINE

## 2024-04-26 PROCEDURE — 72100 X-RAY EXAM L-S SPINE 2/3 VWS: CPT | Mod: TC | Performed by: RADIOLOGY

## 2024-04-26 PROCEDURE — 83540 ASSAY OF IRON: CPT | Performed by: INTERNAL MEDICINE

## 2024-04-26 PROCEDURE — 83550 IRON BINDING TEST: CPT | Performed by: INTERNAL MEDICINE

## 2024-04-26 PROCEDURE — 84443 ASSAY THYROID STIM HORMONE: CPT | Performed by: INTERNAL MEDICINE

## 2024-04-26 PROCEDURE — 99214 OFFICE O/P EST MOD 30 MIN: CPT | Mod: 25 | Performed by: INTERNAL MEDICINE

## 2024-04-26 RX ORDER — PANTOPRAZOLE SODIUM 40 MG/1
40 TABLET, DELAYED RELEASE ORAL DAILY
Qty: 90 TABLET | Refills: 0 | Status: SHIPPED | OUTPATIENT
Start: 2024-04-26 | End: 2024-07-22

## 2024-04-26 RX ORDER — LORATADINE 10 MG/1
10 TABLET ORAL DAILY
Qty: 30 TABLET | Refills: 1 | Status: SHIPPED | OUTPATIENT
Start: 2024-04-26

## 2024-04-26 SDOH — HEALTH STABILITY: PHYSICAL HEALTH: ON AVERAGE, HOW MANY DAYS PER WEEK DO YOU ENGAGE IN MODERATE TO STRENUOUS EXERCISE (LIKE A BRISK WALK)?: 2 DAYS

## 2024-04-26 ASSESSMENT — SOCIAL DETERMINANTS OF HEALTH (SDOH): HOW OFTEN DO YOU GET TOGETHER WITH FRIENDS OR RELATIVES?: ONCE A WEEK

## 2024-04-26 ASSESSMENT — PAIN SCALES - GENERAL: PAINLEVEL: MODERATE PAIN (4)

## 2024-04-26 NOTE — COMMUNITY RESOURCES LIST (ENGLISH)
April 26, 2024           YOUR PERSONALIZED LIST OF SERVICES & PROGRAMS           & SHELTER    Case Management      Living - Housing Support-[facility] (S-I)  [address] [place] [address] (Distance: 6.9 miles)  Phone: [phone]  Website: https://[employer]  Language: Mohawk, English, Serbian  Fee: Insurance      [facility] With Services Independent  [address] [place] [address] (Distance: 10.4 miles)  Phone: [phone]  Website: https://www.[employer]/contact  Language: English, Central African  Accessibility: Ada accessible, Blind accommodation, Deaf or hard of hearing, Translation services      [employer] - Housing Stabilization Services  Phone: [phone]  Website: https://homebasemn.com/  Language: English  Hours: Mon 8:00 AM - 4:00 PM Tue 8:00 AM - 4:00 PM Wed 8:00 AM - 4:00 PM Thu 8:00 AM - 4:00 PM Fri 8:00 AM - 4:00 PM  Fee: Free  Accessibility: Blind accommodation, Deaf or hard of hearing  Transportation Options: Free transportation    Payment Assistance      Living - Housing Support-[facility] (HWS-I)  [address] [place] [address] (Distance: 6.9 miles)  Phone: [phone]  Website: https://[employer]  Language: Mohawk, English, Serbian  Fee: Insurance      [facility] - Stable HOME Program  [address] [place] [address] (Distance: 5.7 miles)  Website: http://www.[facility].org/government/departments-divisions/housing/rental-assistance  Language: English      [employer] - [employer]  Phone: (339) 828-3792  Website: https://www.kjm00-gxbmfksquvapjt.org/programs.html  Language: English  Hours: Mon 7:00 AM - 7:00 PM Tue 7:00 AM - 7:00 PM Wed 7:00 AM - 7:00 PM Thu 7:00 AM - 7:00 PM Fri 7:00 AM - 7:00 PM  Fee: Free    Mediation & Eviction Prevention      Living - Housing Support-[facility] (HWS-I)  [address] [place] [address] (Distance: 6.9  miles)  Phone: [phone]) [phone]  Website: https://www.[employer]  Language: Icelandic, English, Brazilian  Fee: Insurance      [employer] - [place] [address] (Distance: 8.0 miles)  Phone: (767) [phone]  Website: https://www.[employer]/housingcourtinfo  Language: English  Fee: Free      [employer] - Tenant Rights / Eviction Prevention  Website: https://[facility].org/e-dmxj-my-/  Language: English, Mozambican            Medical Transportation, (NEMT)      [employer] [facility]. - Transportation to medical appointments  [address] [place] [address] (Distance: 15.2 miles)  Phone: [phone]  Language: English  Fee: Insurance  Accessibility: Deaf or hard of hearing, Ada accessible, Blind accommodation      [facility] - Transportation to medical appointments  [address] [place] [address] (Distance: 1.6 miles)  Phone: [phone]  Language: English  Fee: Self pay, Insurance      Social Service [facility] - Neighbor to Neighbor Program  Phone: (609) [phone]  Email: [email or web address]  Website: https://www.[facility].org/services/older-adults/-services/neighbor-to-neighbor  Language: English  Hours: Mon 8:00 AM - 5:00 PM Tue 8:00 AM - 5:00 PM Wed 8:00 AM - 5:00 PM Thu 8:00 AM - 5:00 PM Fri 8:00 AM - 5:00 PM  Fee: Insurance, Self pay  Accessibility: Deaf or hard of hearing, Blind accommodation, Translation services    Expense Assistance      [place] - Transit Link  [address] [place] [address] (Distance: 12.6 miles)  Phone: (595) [phone]  Website: https://[employer]/Transportation/Services/Transit-Link.aspx  Language: English  Fee: Self pay      [employer] - [place] - Transit Assistance Program (TAP) - Transportation expense assistance  [address] [place] [address] (Distance: 12.5 miles)  Language: English, Mozambican  Fee: Free, Sliding scale, Self pay  Accessibility: Translation  services      - Dislocated Worker/Adult [employer] Employment Program  Phone: (311) 312-7240  Email: eugenie@[employer]  Website: https://[employer]/services/employment-services/dislocated-worker-program/  Language: English, Malawian  Hours: Mon 8:00 AM - 4:30 PM Tue 8:00 AM - 4:30 PM Wed 8:00 AM - 4:30 PM Thu 8:00 AM - 4:30 PM Fri 8:00 AM - 4:30 PM  Fee: Free  Accessibility: Ada accessible    Coordination      [facility] Community Health Worker [facility]  [address] [place] [address] (Distance: 6.9 miles)  Phone: [phone]) [phone]  Email: rigo@[email or web address]  Website: https://[employer]/  Language: English, Malawian, Upper sorbian  Fee: Free, Insurance      Transportation - Ride coordination  [address] [place] [address] (Distance: 10.0 miles)  Website: https://[employer]  Language: English  Fee: Self pay, Insurance  Accessibility: Ada accessible      [employer] - FREE AIR TRANSPORTATION FOR NON-EMERGENCY MEDICAL OR HUMANITARIAN FLIGHTS  Phone: (613) 436-5663  Email: missions@[employer]  Website: http://www.[employer]  Language: English               IMPORTANT NUMBERS & WEBSITES        Emergency Services  911  .   [facility]  211 http://211unitedway.org  .   Poison Control  (708) 726-1558 http://[place]poison.org http://wis[place]poison.org  .     Suicide and Crisis Lifeline  988 http://988lifeline.org  .   Childhelp National Child Abuse Hotline  614.661.9821 http://Childhelphotline.org   .   National Sexual Assault Hotline  (506) 289-8047 (HOPE) http://Rainn.org   .     National Runaway Safeline  (594) 829-9116 (RUNAWAY) http://[facility].org  .   Pregnancy & Postpartum Support  Call/text 188-493-4315  [place]: http://ppsupportmn.org  [place]: http://psichapters.com/wi  .   Substance Abuse National Helpline (SAMHSA)  800-622-HELP (4762) http://Findtreatment.gov   .                DISCLAIMER: These resources have been generated via the Unite   Platform. [facility] does not endorse any service providers mentioned in this resource list. [facility] does not guarantee that the services mentioned in this resource list will be available to you or will improve your health or wellness.    [facility]

## 2024-04-26 NOTE — PROGRESS NOTES
Preventive Care Visit  [facility] JESSICA Mandel MD, Internal Medicine  Apr 26, 2024        Assessment and Plan  1. Routine general medical examination at a health care facility    Patient is new to me, Last seen [facility] in February 2023 for follow-up on insomnia at that time.  Denies any insomnia related issues at this time but does have ongoing GERD, severe back pain, hair loss issues which she wants to get addressed in this visit.     Recent ER visit in January 2024 for abdominal pain for which all the workup done including CMP, CBC, CT abdomen which is all looking benign.  Discussed the results with the patient and answered all the questions..    Shared decision to recheck only the labs which are not done in the recent ER and will not repeat.  Patient understood and agreed the plan.    - REVIEW OF HEALTH MAINTENANCE PROTOCOL ORDERS  - Lipid panel reflex to direct LDL Fasting; Future  - Vitamin D deficiency screening; Future  - UA with Microscopic reflex to Culture - lab collect; Future  - PRIMARY CARE FOLLOW-UP SCHEDULING; Future  - Lipid panel reflex to direct LDL Fasting  - Vitamin D deficiency screening  - UA with Microscopic reflex to Culture - lab collect    2. Hyperlipidemia LDL goal <130  - Lipid panel reflex to direct LDL Fasting; Future  - Lipid panel reflex to direct LDL Fasting    3. Gastroesophageal reflux disease, unspecified whether esophagitis present  Chronic problem, intermittent flareups.  Well-controlled with pantoprazole as needed which he endorses and would like to get the refill.  Will do as requested.  - pantoprazole (PROTONIX) 40 MG EC tablet; Take 1 tablet (40 mg) by mouth daily  Dispense: 90 tablet; Refill: 0    4. Seasonal allergies  Chronic problem, well-controlled with antihistamines in the past.  Patient requesting for a better 1, will go ahead and give Claritin  - loratadine (CLARITIN) 10 MG tablet; Take 1 tablet (10 mg) by mouth daily   Dispense: 30 tablet; Refill: 1    5. Vitamin D deficiency  - Vitamin D deficiency screening; Future  - Vitamin D deficiency screening    6. Anemia, unspecified type  Previous history of anemia but recent CBC was looking normal.  Will not recheck.    7. Hair loss  New problem added to patient problem list, requesting for medication as well as appropriate workup for why he is losing the hair with receding hairline of the forehead.  Will do possible iron deficiency, thyroid function as well as start on symptomatic Rogaine.  Patient understood and agreed with the plan.  - Iron and iron binding capacity; Future  - TSH with free T4 reflex; Future  - minoxidil (ROGAINE) 2 % external solution; Apply topically 2 times daily On the scalp  Dispense: 60 mL; Refill: 1  - Iron and iron binding capacity  - TSH with free T4 reflex    8. Bilateral low back pain without sciatica, unspecified chronicity  New problem with severe back pain as he endorses that this has been happening for the last 1 year when he had a urinary tract infection in the past with similar back pain at that time.  Current back pain is up to 10 out of 10 to 6 out of 10 in severity.  Physical exam positive for severe tenderness on palpation of the lumbosacral spine, paraspinal muscle spasm which is debilitating to him at this time making him difficult to walk and ambulate.  -Differential diagnosis of possible muscle spasm versus bony abnormalities cannot be excluded, patient denies any trauma or athletics recently.  Will go ahead and check the x-ray before considering physical therapy along with muscle laxer.  Patient understood and agreed with the plan.  - UA with Microscopic reflex to Culture - lab collect; Future  - tiZANidine (ZANAFLEX) 4 MG tablet; Take 1 tablet (4 mg) by mouth nightly as needed for muscle spasms  Dispense: 30 tablet; Refill: 1  - Physical Therapy  Referral; Future  - XR Lumbar Spine 2/3 Views; Future  - UA with Microscopic reflex to  Culture - lab collect    9. Flank pain  - UA with Microscopic reflex to Culture - lab collect; Future  - UA with Microscopic reflex to Culture - lab collect         Please note that this note consists of symbols derived from keyboarding, dictation and/or voice recognition software. As a result, there may be errors in the script that have gone undetected. Please consider this when interpreting information found in this chart.    Patient Instructions   As discussed , please do Non fasting labs     Will Check X ray given the severity of pain ; give Muscle relaxor for your back pain and do physical therapy for improvement.     DEBROX ear drops Over the counter = 6 drops/ 6 days    ===================================  Preventive Care Advice   This is general advice given by our system to help you stay healthy. However, your care team may have specific advice just for you. Please talk to your care team about your preventive care needs.  Nutrition  Eat 5 or more servings of fruits and vegetables each day.  Try wheat bread, brown rice and whole grain pasta (instead of white bread, rice, and pasta).  Get enough calcium and vitamin D. Check the label on foods and aim for 100% of the RDA (recommended daily allowance).  Lifestyle  Exercise at least 150 minutes each week   (30 minutes a day, 5 days a week).  Do muscle strengthening activities 2 days a week. These help control your weight and prevent disease.  No smoking.  Wear sunscreen to prevent skin cancer.  Have a dental exam and cleaning every 6 months.  Yearly exams  See your health care team every year to talk about:  Any changes in your health.  Any medicines your care team has prescribed.  Preventive care, family planning, and ways to prevent chronic diseases.  Shots (vaccines)   HPV shots (up to age 26), if you've never had them before.  Hepatitis B shots (up to age 59), if you've never had them before.  COVID-19 shot: Get this shot when it's due.  Flu shot: Get a flu  shot every year.  Tetanus shot: Get a tetanus shot every 10 years.  Pneumococcal, hepatitis A, and RSV shots: Ask your care team if you need these based on your risk.  Shingles shot (for age 50 and up).  General health tests  Diabetes screening:  Starting at age 35, Get screened for diabetes at least every 3 years.  If you are younger than age 35, ask your care team if you should be screened for diabetes.  Cholesterol test: At age 39, start having a cholesterol test every 5 years, or more often if advised.  Bone density scan (DEXA): At age 50, ask your care team if you should have this scan for osteoporosis (brittle bones).  Hepatitis C: Get tested at least once in your life.  STIs (sexually transmitted infections)  Before age 24: Ask your care team if you should be screened for STIs.  After age 24: Get screened for STIs if you're at risk. You are at risk for STIs (including HIV) if:  You are sexually active with more than one person.  You don't use condoms every time.  You or a partner was diagnosed with a sexually transmitted infection.  If you are at risk for HIV, ask about PrEP medicine to prevent HIV.  Get tested for HIV at least once in your life, whether you are at risk for HIV or not.  Cancer screening tests  Cervical cancer screening: If you have a cervix, begin getting regular cervical cancer screening tests at age 21. Most people who have regular screenings with normal results can stop after age 65. Talk about this with your provider.  Breast cancer scan (mammogram): If you've ever had breasts, begin having regular mammograms starting at age 40. This is a scan to check for breast cancer.  Colon cancer screening: It is important to start screening for colon cancer at age 45.  Have a colonoscopy test every 10 years (or more often if you're at risk) Or, ask your provider about stool tests like a FIT test every year or Cologuard test every 3 years.  To learn more about your testing options, visit:  [email or web address].  For help making a decision, visit: https://bit.ly/qf93417.  Prostate cancer screening test: If you have a prostate and are age 55 to 69, ask your provider if you would benefit from a yearly prostate cancer screening test.  Lung cancer screening: If you are a current or former smoker age 50 to 80, ask your care team if ongoing lung cancer screenings are right for you.  For informational purposes only. Not to replace the advice of your health care provider. Copyright   2023 [employer]. All rights reserved. Clinically reviewed by the [facility]. [employer] [id number] - REV 01/24.    Learning About Stress  What is stress?     Stress is your body's response to a hard situation. Your body can have a physical, emotional, or mental response. Stress is a fact of life for most people, and it affects everyone differently. What causes stress for you may not be stressful for someone else.  A lot of things can cause stress. You may feel stress when you go on a job interview, take a test, or run a race. This kind of short-term stress is normal and even useful. It can help you if you need to work hard or react quickly. For example, stress can help you finish an important job on time.  Long-term stress is caused by ongoing stressful situations or events. Examples of long-term stress include long-term health problems, ongoing problems at work, or conflicts in your family. Long-term stress can harm your health.  How does stress affect your health?  When you are stressed, your body responds as though you are in danger. It makes hormones that speed up your heart, make you breathe faster, and give you a burst of energy. This is called the fight-or-flight stress response. If the stress is over quickly, your body goes back to normal and no harm is done.  But if stress happens too often or lasts too long, it can have bad effects. Long-term stress can make you more  likely to get sick, and it can make symptoms of some diseases worse. If you tense up when you are stressed, you may develop neck, shoulder, or low back pain. Stress is linked to high blood pressure and heart disease.  Stress also harms your emotional health. It can make you sanchez, tense, or depressed. Your relationships may suffer, and you may not do well at work or school.  What can you do to manage stress?  You can try these things to help manage stress:   Do something active. Exercise or activity can help reduce stress. Walking is a great way to get started. Even everyday activities such as housecleaning or yard work can help.  Try yoga or asya chi. These techniques combine exercise and meditation. You may need some training at first to learn them.  Do something you enjoy. For example, listen to music or go to a movie. Practice your hobby or do volunteer work.  Meditate. This can help you relax, because you are not worrying about what happened before or what may happen in the future.  Do guided imagery. Imagine yourself in any setting that helps you feel calm. You can use online videos, books, or a teacher to guide you.  Do breathing exercises. For example:  From a standing position, bend forward from the waist with your knees slightly bent. Let your arms dangle close to the floor.  Breathe in slowly and deeply as you return to a standing position. Roll up slowly and lift your head last.  Hold your breath for just a few seconds in the standing position.  Breathe out slowly and bend forward from the waist.  Let your feelings out. Talk, laugh, cry, and express anger when you need to. Talking with supportive friends or family, a counselor, or a eduardo leader about your feelings is a healthy way to relieve stress. Avoid discussing your feelings with people who make you feel worse.  Write. It may help to write about things that are bothering you. This helps you find out how much stress you feel and what is causing it.  "When you know this, you can find better ways to cope.  What can you do to prevent stress?  You might try some of these things to help prevent stress:  Manage your time. This helps you find time to do the things you want and need to do.  Get enough sleep. Your body recovers from the stresses of the day while you are sleeping.  Get support. Your family, friends, and community can make a difference in how you experience stress.  Limit your news feed. Avoid or limit time on social media or news that may make you feel stressed.  Do something active. Exercise or activity can help reduce stress. Walking is a great way to get started.  Where can you learn more?  Go to https://www.[employer].net/patiented  Enter N032 in the search box to learn more about \"Learning About Stress.\"  Current as of: October 24, 2023               Content Version: 14.0    5919-4884 [employer].   Care instructions adapted under license by your healthcare professional. If you have questions about a medical condition or this instruction, always ask your healthcare professional. [employer] disclaims any warranty or liability for your use of this information.      Safer Sex: Care Instructions  Overview  Safer sex is a way to reduce your risk of getting a sexually transmitted infection (STI). It can also help prevent pregnancy.  Several products can help you practice safer sex and reduce your chance of STIs. One of the best is a condom. There are internal and external condoms. You can use a special rubber sheet (dental dam) for protection during oral sex. Disposable gloves can keep your hands from touching blood, semen, or other body fluids that can carry infections.  Remember that birth control methods such as diaphragms, IUDs, foams, and birth control pills do not stop you from getting STIs.  Follow-up care is a key part of your treatment and safety. Be sure to make and go to all appointments, and call your doctor if you " "are having problems. It's also a good idea to know your test results and keep a list of the medicines you take.  How can you care for yourself at home?  Think about getting vaccinated to help prevent hepatitis A, hepatitis B, and human papillomavirus (HPV). They can be spread through sex.  Use a condom every time you have sex. Use an external condom, which goes on the penis. Or use an internal condom, which goes into the vagina or anus.  Make sure you use the right size external condom. A condom that's too small can break easily. A condom that's too big can slip off during sex.  Use a new condom each time you have sex. Be careful not to poke a hole in the condom when you open the wrapper.  Don't use an internal condom and an external condom at the same time.  Never use petroleum jelly (such as Vaseline), grease, hand lotion, baby oil, or anything with oil in it. These products can make holes in the condom.  After intercourse, hold the edge of the condom as you remove it. This will help keep semen from spilling out of the condom.  Do not have sex with anyone who has symptoms of an STI, such as sores on the genitals or mouth.  Do not drink a lot of alcohol or use drugs before sex.  Limit your sex partners. Sex with one partner who has sex only with you can reduce your risk of getting an STI.  Don't share sex toys. But if you do share them, use a condom and clean the sex toys between each use.  Talk to any partners before you have sex. Talk about what you feel comfortable with and whether you have any boundaries with sex. And find out if your partner or partners may be at risk for any STI. Keep in mind that a person may be able to spread an STI even if they do not have symptoms. You and any partners may want to get tested for STIs.  Where can you learn more?  Go to https://www.healthwise.net/patiented  Enter B608 in the search box to learn more about \"Safer Sex: Care Instructions.\"  Current as of: November 27, 2023     "           Content Version: 14.0    0530-5479 [employer].   Care instructions adapted under license by your healthcare professional. If you have questions about a medical condition or this instruction, always ask your healthcare professional. [employer] disclaims any warranty or liability for your use of this information.      Return in about 4 weeks (around 5/24/2024), or if symptoms worsen or fail to improve, for If symptoms persist, Follow up of last visit.    Natasha Mandel MD  [facility] JESSICA Mejia is a 29 year old, presenting for the following:  Physical        4/26/2024     6:59 AM   Additional Questions   [facility]        Health Care Directive  Patient does not have a Health Care Directive or Living Will: Discussed advance care planning with patient; however, patient declined at this time.    HPI        4/26/2024   General Health   How would you rate your overall physical health? Good   Feel stress (tense, anxious, or unable to sleep) Only a little   (!) STRESS CONCERN      4/26/2024   Nutrition   Three or more servings of calcium each day? Yes   Diet: Regular (no restrictions)   How many servings of fruit and vegetables per day? (!) 0-1   How many sweetened beverages each day? (!) 3         4/26/2024   Exercise   Days per week of moderate/strenous exercise 2 days   (!) EXERCISE CONCERN      4/26/2024   Social Factors   Frequency of gathering with friends or relatives Once a week   Worry food won't last until get money to buy more No   Food not last or not have enough money for food? No   Do you have housing?  Yes   Are you worried about losing your housing? Yes   Lack of transportation? Yes   Unable to get utilities (heat,electricity)? No   Want help with housing or utility concern? (!) YES    (!) TRANSPORTATION CONCERN PRESENT(!) HOUSING CONCERN PRESENT      4/26/2024   Dental   Dentist two times every year? Yes          4/26/2024   TB Screening   Were you born outside of the [place]? Yes         Today's PHQ-2 Score:       4/26/2024     6:51 AM   PHQ-2 ( 1999 [employer])   Q1: Little interest or pleasure in doing things 0   Q2: Feeling down, depressed or hopeless 0   PHQ-2 Score 0   Q1: Little interest or pleasure in doing things Not at all   Q2: Feeling down, depressed or hopeless Not at all   PHQ-2 Score 0           4/26/2024   Substance Use   Alcohol more than 3/day or more than 7/wk Not Applicable   Do you use any other substances recreationally? No     Social History     Tobacco Use    Smoking status: Never    Smokeless tobacco: Never   Substance Use Topics    Alcohol use: No    Drug use: No           4/26/2024   STI Screening   New sexual partner(s) since last STI/HIV test? (!) YES          4/26/2024   Contraception/Family Planning   Questions about contraception or family planning No        Reviewed and updated as needed this visit by Provider   Tobacco  Allergies  Meds  Problems  Med Hx  Surg Hx  Fam Hx            Past Medical History:   Diagnosis Date    Abnormal laboratory test 1/1/2012    Allergic state     Anemia 8/6/2013    Environmental allergies 1/6/2013    Vitamin D deficiency 1/24/14    vitamin D level 23     History reviewed. No pertinent surgical history.  Lab work is in process  Labs reviewed in EPIC  BP Readings from Last 3 Encounters:   04/26/24 96/71   01/23/24 104/74   02/20/23 111/73    Wt Readings from Last 3 Encounters:   04/26/24 87.1 kg (192 lb)   01/23/24 88.5 kg (195 lb)   02/20/23 89.3 kg (196 lb 12.8 oz)                  Patient Active Problem List   Diagnosis    GERD (gastroesophageal reflux disease)    Abnormal laboratory test    Environmental allergies    Hyperlipidemia LDL goal <130    Anemia    Vitamin D deficiency    Insomnia    Cyclothymic disorder     History reviewed. No pertinent surgical history.    Social History     Tobacco Use    Smoking status: Never    Smokeless tobacco: Never  "  Substance Use Topics    Alcohol use: No     Family History   Problem Relation Age of Onset    Family History Negative No family hx of          Current Outpatient Medications   Medication Sig Dispense Refill    loratadine (CLARITIN) 10 MG tablet Take 1 tablet (10 mg) by mouth daily 30 tablet 1    minoxidil (ROGAINE) 2 % external solution Apply topically 2 times daily On the scalp 60 mL 1    pantoprazole (PROTONIX) 40 MG EC tablet Take 1 tablet (40 mg) by mouth daily 90 tablet 0    tiZANidine (ZANAFLEX) 4 MG tablet Take 1 tablet (4 mg) by mouth nightly as needed for muscle spasms 30 tablet 1     Allergies   Allergen Reactions    Dust Mites     Minocycline Hives     Recent Labs   Lab Test 01/23/24  0741 07/18/22  1209 07/17/22  1934 09/08/21  1039 11/18/20  0843 10/19/20  1053 09/17/20  1431   LDL  --   --   --   --  105* 143* 96   HDL  --   --   --   --  43 51 53   TRIG  --   --   --   --  147 75 103   ALT 13 24 24  --  31 30 26   CR 0.74 0.83 0.87   < > 0.72 0.78 0.85   GFRESTIMATED >90 >90 >90   < > >90 >90 >90   GFRESTBLACK  --   --   --   --  >90 >90 >90   POTASSIUM 3.9 4.5 4.3   < > 4.1 4.2 4.0    < > = values in this interval not displayed.          Review of Systems  Constitutional, HEENT, cardiovascular, pulmonary, GI, , musculoskeletal, neuro, skin, endocrine and psych systems are negative, except as otherwise noted.     Objective    Exam  BP 96/71   Pulse 77   Temp 97.6  F (36.4  C) (Temporal)   Resp 20   Ht 1.81 m (5' 11.26\")   Wt 87.1 kg (192 lb)   SpO2 100%   BMI 26.58 kg/m     Estimated body mass index is 26.58 kg/m  as calculated from the following:    Height as of this encounter: 1.81 m (5' 11.26\").    Weight as of this encounter: 87.1 kg (192 lb).    Physical Exam  GENERAL: alert and no distress  EYES: Eyes grossly normal to inspection, PERRL and conjunctivae and sclerae normal  HENT: ear canals and TM's normal, nose and mouth without ulcers or lesions  NECK: no adenopathy, no asymmetry, " masses, or scars  RESP: lungs clear to auscultation - no rales, rhonchi or wheezes  CV: regular rate and rhythm, normal S1 S2, no S3 or S4, no murmur, click or rub, no peripheral edema  ABDOMEN: soft, nontender, no hepatosplenomegaly, no masses and bowel sounds normal  MS: no gross musculoskeletal defects noted, no edema  BACK EXAM : Positive for severe tenderness on palpation of the lumbosacral spine, paraspinal muscle spasm which is debilitating as he endorses, SLR test done bilaterally which is positive both sides at 60 degrees and 45 degrees respectively.  SKIN: no suspicious lesions or rashes  NEURO: Normal strength and tone, mentation intact and speech normal  PSYCH: mentation appears normal, affect normal/bright        Signed Electronically by: Natasha Mandel MD

## 2024-04-26 NOTE — PATIENT INSTRUCTIONS
As discussed , please do Non fasting labs     Will Check X ray given the severity of pain ; give Muscle relaxor for your back pain and do physical therapy for improvement.     DEBROX ear drops Over the counter = 6 drops/ 6 days    ===================================  Preventive Care Advice   This is general advice given by our system to help you stay healthy. However, your care team may have specific advice just for you. Please talk to your care team about your preventive care needs.  Nutrition  Eat 5 or more servings of fruits and vegetables each day.  Try wheat bread, brown rice and whole grain pasta (instead of white bread, rice, and pasta).  Get enough calcium and vitamin D. Check the label on foods and aim for 100% of the RDA (recommended daily allowance).  Lifestyle  Exercise at least 150 minutes each week   (30 minutes a day, 5 days a week).  Do muscle strengthening activities 2 days a week. These help control your weight and prevent disease.  No smoking.  Wear sunscreen to prevent skin cancer.  Have a dental exam and cleaning every 6 months.  Yearly exams  See your health care team every year to talk about:  Any changes in your health.  Any medicines your care team has prescribed.  Preventive care, family planning, and ways to prevent chronic diseases.  Shots (vaccines)   HPV shots (up to age 26), if you've never had them before.  Hepatitis B shots (up to age 59), if you've never had them before.  COVID-19 shot: Get this shot when it's due.  Flu shot: Get a flu shot every year.  Tetanus shot: Get a tetanus shot every 10 years.  Pneumococcal, hepatitis A, and RSV shots: Ask your care team if you need these based on your risk.  Shingles shot (for age 50 and up).  General health tests  Diabetes screening:  Starting at age 35, Get screened for diabetes at least every 3 years.  If you are younger than age 35, ask your care team if you should be screened for diabetes.  Cholesterol test: At age 39, start having a  cholesterol test every 5 years, or more often if advised.  Bone density scan (DEXA): At age 50, ask your care team if you should have this scan for osteoporosis (brittle bones).  Hepatitis C: Get tested at least once in your life.  STIs (sexually transmitted infections)  Before age 24: Ask your care team if you should be screened for STIs.  After age 24: Get screened for STIs if you're at risk. You are at risk for STIs (including HIV) if:  You are sexually active with more than one person.  You don't use condoms every time.  You or a partner was diagnosed with a sexually transmitted infection.  If you are at risk for HIV, ask about PrEP medicine to prevent HIV.  Get tested for HIV at least once in your life, whether you are at risk for HIV or not.  Cancer screening tests  Cervical cancer screening: If you have a cervix, begin getting regular cervical cancer screening tests at age 21. Most people who have regular screenings with normal results can stop after age 65. Talk about this with your provider.  Breast cancer scan (mammogram): If you've ever had breasts, begin having regular mammograms starting at age 40. This is a scan to check for breast cancer.  Colon cancer screening: It is important to start screening for colon cancer at age 45.  Have a colonoscopy test every 10 years (or more often if you're at risk) Or, ask your provider about stool tests like a FIT test every year or Cologuard test every 3 years.  To learn more about your testing options, visit: https://www.Fanmode/954696.pdf.  For help making a decision, visit: https://bit.ly/yo52217.  Prostate cancer screening test: If you have a prostate and are age 55 to 69, ask your provider if you would benefit from a yearly prostate cancer screening test.  Lung cancer screening: If you are a current or former smoker age 50 to 80, ask your care team if ongoing lung cancer screenings are right for you.  For informational purposes only. Not to replace the  advice of your health care provider. Copyright   2023 [facility]. All rights reserved. Clinically reviewed by the [facility]. [employer] 365454 - REV 01/24.    Learning About Stress  What is stress?     Stress is your body's response to a hard situation. Your body can have a physical, emotional, or mental response. Stress is a fact of life for most people, and it affects everyone differently. What causes stress for you may not be stressful for someone else.  A lot of things can cause stress. You may feel stress when you go on a job interview, take a test, or run a race. This kind of short-term stress is normal and even useful. It can help you if you need to work hard or react quickly. For example, stress can help you finish an important job on time.  Long-term stress is caused by ongoing stressful situations or events. Examples of long-term stress include long-term health problems, ongoing problems at work, or conflicts in your family. Long-term stress can harm your health.  How does stress affect your health?  When you are stressed, your body responds as though you are in danger. It makes hormones that speed up your heart, make you breathe faster, and give you a burst of energy. This is called the fight-or-flight stress response. If the stress is over quickly, your body goes back to normal and no harm is done.  But if stress happens too often or lasts too long, it can have bad effects. Long-term stress can make you more likely to get sick, and it can make symptoms of some diseases worse. If you tense up when you are stressed, you may develop neck, shoulder, or low back pain. Stress is linked to high blood pressure and heart disease.  Stress also harms your emotional health. It can make you sanchez, tense, or depressed. Your relationships may suffer, and you may not do well at work or school.  What can you do to manage stress?  You can try these things to help manage stress:    Do something active. Exercise or activity can help reduce stress. Walking is a great way to get started. Even everyday activities such as housecleaning or yard work can help.  Try yoga or asya chi. These techniques combine exercise and meditation. You may need some training at first to learn them.  Do something you enjoy. For example, listen to music or go to a movie. Practice your hobby or do volunteer work.  Meditate. This can help you relax, because you are not worrying about what happened before or what may happen in the future.  Do guided imagery. Imagine yourself in any setting that helps you feel calm. You can use online videos, books, or a teacher to guide you.  Do breathing exercises. For example:  From a standing position, bend forward from the waist with your knees slightly bent. Let your arms dangle close to the floor.  Breathe in slowly and deeply as you return to a standing position. Roll up slowly and lift your head last.  Hold your breath for just a few seconds in the standing position.  Breathe out slowly and bend forward from the waist.  Let your feelings out. Talk, laugh, cry, and express anger when you need to. Talking with supportive friends or family, a counselor, or a eduardo leader about your feelings is a healthy way to relieve stress. Avoid discussing your feelings with people who make you feel worse.  Write. It may help to write about things that are bothering you. This helps you find out how much stress you feel and what is causing it. When you know this, you can find better ways to cope.  What can you do to prevent stress?  You might try some of these things to help prevent stress:  Manage your time. This helps you find time to do the things you want and need to do.  Get enough sleep. Your body recovers from the stresses of the day while you are sleeping.  Get support. Your family, friends, and community can make a difference in how you experience stress.  Limit your news feed. Avoid or  "limit time on social media or news that may make you feel stressed.  Do something active. Exercise or activity can help reduce stress. Walking is a great way to get started.  Where can you learn more?  Go to https://www.[employer].net/patiented  Enter N032 in the search box to learn more about \"Learning About Stress.\"  Current as of: October 24, 2023               Content Version: 14.0    3964-5198 [employer].   Care instructions adapted under license by your healthcare professional. If you have questions about a medical condition or this instruction, always ask your healthcare professional. [employer] disclaims any warranty or liability for your use of this information.      Safer Sex: Care Instructions  Overview  Safer sex is a way to reduce your risk of getting a sexually transmitted infection (STI). It can also help prevent pregnancy.  Several products can help you practice safer sex and reduce your chance of STIs. One of the best is a condom. There are internal and external condoms. You can use a special rubber sheet (dental dam) for protection during oral sex. Disposable gloves can keep your hands from touching blood, semen, or other body fluids that can carry infections.  Remember that birth control methods such as diaphragms, IUDs, foams, and birth control pills do not stop you from getting STIs.  Follow-up care is a key part of your treatment and safety. Be sure to make and go to all appointments, and call your doctor if you are having problems. It's also a good idea to know your test results and keep a list of the medicines you take.  How can you care for yourself at home?  Think about getting vaccinated to help prevent hepatitis A, hepatitis B, and human papillomavirus (HPV). They can be spread through sex.  Use a condom every time you have sex. Use an external condom, which goes on the penis. Or use an internal condom, which goes into the vagina or anus.  Make sure you use the " "right size external condom. A condom that's too small can break easily. A condom that's too big can slip off during sex.  Use a new condom each time you have sex. Be careful not to poke a hole in the condom when you open the wrapper.  Don't use an internal condom and an external condom at the same time.  Never use petroleum jelly (such as Vaseline), grease, hand lotion, baby oil, or anything with oil in it. These products can make holes in the condom.  After intercourse, hold the edge of the condom as you remove it. This will help keep semen from spilling out of the condom.  Do not have sex with anyone who has symptoms of an STI, such as sores on the genitals or mouth.  Do not drink a lot of alcohol or use drugs before sex.  Limit your sex partners. Sex with one partner who has sex only with you can reduce your risk of getting an STI.  Don't share sex toys. But if you do share them, use a condom and clean the sex toys between each use.  Talk to any partners before you have sex. Talk about what you feel comfortable with and whether you have any boundaries with sex. And find out if your partner or partners may be at risk for any STI. Keep in mind that a person may be able to spread an STI even if they do not have symptoms. You and any partners may want to get tested for STIs.  Where can you learn more?  Go to https://www.[employer]Gir[employer].net/patiented  Enter B608 in the search box to learn more about \"Safer Sex: Care Instructions.\"  Current as of: November 27, 2023               Content Version: 14.0    4229-2351 [employer].   Care instructions adapted under license by your healthcare professional. If you have questions about a medical condition or this instruction, always ask your healthcare professional. [employer] disclaims any warranty or liability for your use of this information.      "

## 2024-04-28 NOTE — RESULT ENCOUNTER NOTE
Your X ray is normal, no concerns per radiology review.  Please keep up your physical therapy sessions which I have placed the orders.    Please let me know if you have any questions.  Natasha Mandel MD on 4/28/2024

## 2024-04-28 NOTE — RESULT ENCOUNTER NOTE
Your Cholesterol is borderline normal. Given your age and no cardiac risk factors , recommend dietery management of limiting high fat foods and red meat . Life style measures on weight reduction recommended.     All your OTHER labs normal, you may see some highlighted which do not have Clinical significance.    Please let me know if you have any questions.  Natasha Mandel MD on 4/28/2024   [facility]

## 2024-04-29 ENCOUNTER — THERAPY VISIT (OUTPATIENT)
Dept: PHYSICAL THERAPY | Facility: CLINIC | Age: 29
End: 2024-04-29
Attending: INTERNAL MEDICINE
Payer: COMMERCIAL

## 2024-04-29 DIAGNOSIS — M54.50 BILATERAL LOW BACK PAIN WITHOUT SCIATICA, UNSPECIFIED CHRONICITY: ICD-10-CM

## 2024-04-29 PROCEDURE — 97161 PT EVAL LOW COMPLEX 20 MIN: CPT | Mod: GP

## 2024-04-29 PROCEDURE — 97110 THERAPEUTIC EXERCISES: CPT | Mod: GP

## 2024-04-29 NOTE — PROGRESS NOTES
"PHYSICAL THERAPY EVALUATION  Type of Visit: Evaluation    See electronic medical record for Abuse and Falls Screening details.    Subjective       Presenting condition or subjective complaint: lower back issues as of couple weeks ago  Patient reports to outpatient physical therapy with bilateral LBP that started April 20, 2024. He just started to feel a burning \"hot blood\" sensation in his low back when he reached to grab something on the floor. He had this about a year ago and took antibiotics which helped a little but then with time went away. The pain went down since it started, but still lingering. He started a muscle relaxant and has been taking for the past few days. Patient has the most pain with sitting and resting but less with movement. After resting for a little bit he feels the pain again. Points across the low back where the pain is. Pain at present is more of a sharp and achy/hurting feeling.    Goals: sit with more comfort, bend with less pain    Date of onset: 04/26/24 (date of order)    Relevant medical history:     Past Medical History:   Diagnosis Date    Abnormal laboratory test 1/1/2012    Allergic state     Anemia 8/6/2013    Environmental allergies 1/6/2013    Vitamin D deficiency 1/24/14    vitamin D level 23     Dates & types of surgery:    No past surgical history on file.    Prior diagnostic imaging/testing results:       Prior therapy history for the same diagnosis, illness or injury: No      Prior Level of Function  Transfers: Independent  Ambulation: Independent  ADL: Independent  IADL:     Living Environment  Social support: With family members   Type of home: [facility]   Stairs to enter the home: No       Ramp: No   Stairs inside the home:     Is there a railing: Yes   Help at home: None  Equipment owned:       Employment: No    Hobbies/Interests: none at the moment, but I usually play soccer/basketball    Patient goals for therapy: getting up after resting; difficult and it " julia    Pain assessment: Pain present     Objective   LUMBAR SPINE EVALUATION  PAIN: Pain Level at Rest: 6/10  Pain Level with Use: 10/10  POSTURE: WFL  Sitting Posture: Lordosis decreased  GAIT:   Weightbearing Status:  normal  Assistive Device(s): None  Gait Deviations:  WFL  ROM:  lumbar flexion = limited with +, extension = limited with +; after manual: slight improvement in flexion ROM and less pain with extension compared to flexion  Hip: flexion = limited bilaterally and reproduction on LBP bilaterally; ER = WFL bilaterally, IR = WFL bilaterally + in LB with L IR  STRENGTH:  hip abd: 3 L side, 4- R side, hip ext: 4+ bilaterally with reproduction on LBP  MYOTOMES:  L4 and S1 WNL bilaterally  DERMATOMES:  denies numbness and tingling  NEURAL TENSION:  slump: - bilaterally   FLEXIBILITY:  heel to glute = WNL bilaterally    FUNCTIONAL TESTS: Double Leg Squat: Improper use of glutes/hips and patient reports increased LBP  PALPATION:  tenderness paraspinals bilaterally lumbar spine  SPINAL SEGMENTAL CONCLUSIONS:  slight hypomobility lower lumbar spine, pain with central PA lumbar spine    Assessment & Plan   CLINICAL IMPRESSIONS  Medical Diagnosis: bilateral LBP    Treatment Diagnosis:     Impression/Assessment: Patient is a 29 year old male with bilateral LBP complaints.  The following significant findings have been identified: Pain, Decreased ROM/flexibility, Decreased joint mobility, Decreased strength, Impaired muscle performance, Decreased activity tolerance, and Impaired posture. These impairments interfere with their ability to perform self care tasks, work tasks, recreational activities, household chores, driving , household mobility, and community mobility as compared to previous level of function.     Clinical Decision Making (Complexity):  Clinical Presentation: Stable/Uncomplicated  Clinical Presentation Rationale: based on medical and personal factors listed in PT evaluation  Clinical Decision Making  (Complexity): Low complexity    PLAN OF CARE  Treatment Interventions:  Modalities: Dry Needling, E-stim, Hot Pack  Interventions: Manual Therapy, Neuromuscular Re-education, Therapeutic Activity, Therapeutic Exercise, Self-Care/Home Management    Long Term Goals     PT Goal 1  Goal Identifier: sitting  Goal Description: patient will be able to sit for 1 hour with 2/10 pain or less  Rationale: to maximize safety and independence with performance of ADLs and functional tasks;to maximize safety and independence within the home;to maximize safety and independence with self cares  Target Date: 07/22/24  PT Goal 2  Goal Identifier: ambulation  Goal Description: patient will be able to walk for 30 + min without pain  Rationale: to maximize safety and independence with performance of ADLs and functional tasks;to maximize safety and independence within the home;to maximize safety and independence with self cares  Target Date: 07/22/24      Frequency of Treatment: 1x every vs other week progressing to 1 x/month  Duration of Treatment: 2-3 months    Recommended Referrals to Other Professionals: n/a at present  Education Assessment:   Learner/Method: Patient  Education Comments: patient arrived 8 min late to soto    Risks and benefits of evaluation/treatment have been explained.   Patient/Family/caregiver agrees with Plan of Care.     Evaluation Time:     PT Eval, Low Complexity Minutes (08813): 12     Signing Clinician: Amanda Otero, PT      [facility]                                                                                   OUTPATIENT PHYSICAL THERAPY      PLAN OF TREATMENT FOR OUTPATIENT REHABILITATION   Patient's Last Name, First Name, Jackie Meier YOB: 1995   Provider's Name   [facility]   Medical Record No.  [id number]     Onset Date: 04/26/24 (date of order)  Start of Care Date: 04/29/24     Medical Diagnosis:  bilateral  LBP      PT Treatment Diagnosis:    Plan of Treatment  Frequency/Duration: 1x every vs other week progressing to 1 x/month/ 2-3 months    Certification date from 04/29/24 to 07/22/24         See note for plan of treatment details and functional goals     Amanda Otero, PT                         I CERTIFY THE NEED FOR THESE SERVICES FURNISHED UNDER        THIS PLAN OF TREATMENT AND WHILE UNDER MY CARE     (Physician attestation of this document indicates review and certification of the therapy plan).              Referring Provider:  Natasha Mandel    Initial Assessment  See Epic Evaluation- Start of Care Date: 04/29/24

## 2024-06-30 DIAGNOSIS — M54.50 BILATERAL LOW BACK PAIN WITHOUT SCIATICA, UNSPECIFIED CHRONICITY: ICD-10-CM

## 2024-07-22 DIAGNOSIS — K21.9 GASTROESOPHAGEAL REFLUX DISEASE, UNSPECIFIED WHETHER ESOPHAGITIS PRESENT: ICD-10-CM

## 2024-07-22 RX ORDER — PANTOPRAZOLE SODIUM 40 MG/1
40 TABLET, DELAYED RELEASE ORAL DAILY
Qty: 90 TABLET | Refills: 2 | Status: SHIPPED | OUTPATIENT
Start: 2024-07-22

## 2024-07-29 ENCOUNTER — THERAPY VISIT (OUTPATIENT)
Dept: PHYSICAL THERAPY | Facility: CLINIC | Age: 29
End: 2024-07-29
Payer: COMMERCIAL

## 2024-07-29 DIAGNOSIS — M54.50 BILATERAL LOW BACK PAIN WITHOUT SCIATICA, UNSPECIFIED CHRONICITY: Primary | ICD-10-CM

## 2024-07-29 PROCEDURE — 97110 THERAPEUTIC EXERCISES: CPT | Mod: GP

## 2024-07-29 PROCEDURE — 97112 NEUROMUSCULAR REEDUCATION: CPT | Mod: GP

## 2024-07-29 NOTE — PROGRESS NOTES
07/29/24 0500   Appointment Info   Signing clinician's name / credentials Amanda Otero, PT, DPT   Total/Authorized Visits 10   Visits Used 2   Medical Diagnosis bilateral LBP   Quick Adds Certification   Progress Note/Certification   Start of Care Date 04/29/24   Onset of illness/injury or Date of Surgery 04/26/24  (date of order)   Therapy Frequency 1x every vs other week progressing to 1 x/month   Predicted Duration 2-3 months   Certification date from 07/29/24   Certification date to 10/21/24   Progress Note Completed Date 07/29/24   GOALS   PT Goals 2   PT Goal 1   Goal Identifier sitting   Goal Description patient will be able to sit for 1 hour with 2/10 pain or less   Rationale to maximize safety and independence with performance of ADLs and functional tasks;to maximize safety and independence within the home;to maximize safety and independence with self cares   Target Date 07/22/24   PT Goal 2   Goal Identifier ambulation   Goal Description patient will be able to walk for 30 + min without pain   Rationale to maximize safety and independence with performance of ADLs and functional tasks;to maximize safety and independence within the home;to maximize safety and independence with self cares   Target Date 07/22/24   Subjective Report   Subjective Report This is the first fu for patient since eval. Patient reports that his back got better, but within the past week and a half noticed some mid L side tension in mid back and then R lower back. This is for a couple hours when the pain starts to come on. He drove to [place] a couple days ago and noticed it then. Pain overall has improved. Exercises have been on and off since April.   Objective Measures   Objective Measures Objective Measure 1;Objective Measure 2;Objective Measure 3   Objective Measure 1   Objective Measure pain   Details 5/10   Objective Measure 2   Objective Measure thoracic ROM   Details flexion = WNL, extension + L side mid-lower thoracic spine,  rotation = WNL tension across LB   Objective Measure 3   Objective Measure lumbar ROM   Details flexion = WNL, extension = limited + LB   Treatment Interventions (PT)   Interventions Therapeutic Procedure/Exercise;Manual Therapy;Neuromuscular Re-education   Therapeutic Procedure/Exercise   Therapeutic Procedures: strength, endurance, ROM, flexibility minutes (35429) 17   PTRx Ther Proc 1 Prone On Elbows   PTRx Ther Proc 1 - Details x 1-2 min in clinic with VC for set-up   PTRx Ther Proc 2 Single Knee to Chest   PTRx Ther Proc 2 - Details hep for mobility VR in clinic   PTRx Ther Proc 3 Step Standing Low Back Stretch   PTRx Ther Proc 3 - Details hep for mobility VR in clinic   PTRx Ther Proc 4 Supine Lumbar Hip Roll   PTRx Ther Proc 4 - Details x 10 each side in clinic with TC for set-up   Skilled Intervention see above   Patient Response/Progress patient tolerated well   PTRx Ther Proc 5 Supine Abdominal Exercise #3 (Marching)   PTRx Ther Proc 5 - Details x 10 each side in clinic with TC and VC for set-up   PTRx Ther Proc 6 Prone Plank Modified Knees   PTRx Ther Proc 6 - Details 2 x 20-30 sec in clinic with TC for set-up   Therapeutic Activity   PTRx Ther Act 1 Posture Correction with Lumbar Roll   PTRx Ther Act 1 - Details VR in clinic to use while sitting   PTRx Ther Act 2 Education Sheet General   PTRx Ther Act 2 - Details hep:getting back to activity as quickly as possible to help with low back healing start with 15-30 min of walking each day   Neuromuscular Re-education   PTRx Neuro Re-ed 1 Diaphragmatic Breathing - with Object/Weight   PTRx Neuro Re-ed 1 - Details x 8 in clinic with demonstration and TC for set-up   PTRx Neuro Re-ed 2 Abdominal Brace Transverse Abdominis   PTRx Neuro Re-ed 2 - Details x 8 in clinic with TC and VC for activation   PTRx Neuro Re-ed 3 Ball Exercise Seated Pelvic Tilt   PTRx Neuro Re-ed 3 - Details patient ed with demonstration in clinic   Neuromuscular re-ed of mvmt, balance,  coord, kinesthetic sense, posture, proprioception minutes (91170) 8   Skilled Intervention see above   Patient Response/Progress patient requires TC, VC, and demonstration   Manual Therapy   Manual Therapy: Mobilization, MFR, MLD, friction massage minutes (56466) 3   Manual Therapy 1 lumbar HVLAT sidelying   Manual Therapy 1 - Details x 2-3 each side   Skilled Intervention to reduce pain and improve mobility   Patient Response/Progress patient tolerated well and reports less pain afterwards with lumbar mobility   Education   Learner/Method Patient   Plan   Home program see PTRX   Updates to plan of care n/a   Plan for next session review hep, manual as indicated, continue to progress LB mobility, progress core and hip strength   Total Session Time   Timed Code Treatment Minutes 28   Total Treatment Time (sum of timed and untimed services) 28       [facility]                                                                                   OUTPATIENT PHYSICAL THERAPY    PLAN OF TREATMENT FOR OUTPATIENT REHABILITATION   Patient's Last Name, First Name, MARYAHussainHAMLETHussain  NaomieJackie lee YOB: 1995   Provider's Name   [facility]   Medical Record No.  [id number]     Onset Date: 04/26/24 (date of order)  Start of Care Date: 04/29/24     Medical Diagnosis:  bilateral LBP      PT Treatment Diagnosis:    Plan of Treatment  Frequency/Duration: 1x every vs other week progressing to 1 x/month/ 2-3 months    Certification date from 07/29/24 to 10/21/24         See note for plan of treatment details and functional goals     Amanda Otero, PT                         I CERTIFY THE NEED FOR THESE SERVICES FURNISHED UNDER        THIS PLAN OF TREATMENT AND WHILE UNDER MY CARE     (Physician attestation of this document indicates review and certification of the therapy plan).              Referring Provider:  Natasha Mandel    Initial Assessment  See Epic  Evaluation- Start of Care Date: 04/29/24            PLAN  Continue therapy per current plan of care.    Beginning/End Dates of Progress Note Reporting Period:  4/29/2024 to 07/29/2024    Referring Provider:  Natasha Mandel

## 2024-09-08 DIAGNOSIS — M54.50 BILATERAL LOW BACK PAIN WITHOUT SCIATICA, UNSPECIFIED CHRONICITY: ICD-10-CM

## 2024-09-23 ENCOUNTER — THERAPY VISIT (OUTPATIENT)
Dept: PHYSICAL THERAPY | Facility: CLINIC | Age: 29
End: 2024-09-23
Payer: COMMERCIAL

## 2024-09-23 DIAGNOSIS — M54.50 BILATERAL LOW BACK PAIN WITHOUT SCIATICA, UNSPECIFIED CHRONICITY: Primary | ICD-10-CM

## 2024-09-23 PROCEDURE — 97112 NEUROMUSCULAR REEDUCATION: CPT | Mod: GP

## 2024-09-23 PROCEDURE — 97110 THERAPEUTIC EXERCISES: CPT | Mod: GP

## 2024-09-23 NOTE — PROGRESS NOTES
09/23/24 0500   Appointment Info   Signing clinician's name / credentials Amanda Otero, PT, DPT   Total/Authorized Visits 10   Visits Used 3   Medical Diagnosis bilateral LBP   Progress Note/Certification   Start of Care Date 04/29/24   Onset of illness/injury or Date of Surgery 04/26/24  (date of order)   Therapy Frequency 1x every vs other week progressing to 1 x/month   Predicted Duration 2-3 months   Certification date from 09/23/24   Certification date to 12/16/24   Progress Note Completed Date 09/23/24   GOALS   PT Goals 2   PT Goal 1   Goal Identifier sitting   Goal Description patient will be able to sit for 1 hour with 2/10 pain or less   Rationale to maximize safety and independence with performance of ADLs and functional tasks;to maximize safety and independence within the home;to maximize safety and independence with self cares   Target Date 07/22/24   PT Goal 2   Goal Identifier ambulation   Goal Description patient will be able to walk for 30 + min without pain   Rationale to maximize safety and independence with performance of ADLs and functional tasks;to maximize safety and independence within the home;to maximize safety and independence with self cares   Goal Progress can walk for 2 miles at present without pain and 4 miles yesterday without discomfort   Target Date 07/22/24   Date Met 09/23/24   Subjective Report   Subjective Report Patient reports that he feels more of his discomfort on the sides of his torso. HEP going well and doing some from YouTube as well. Able to walk for 4 miles without pain.   Objective Measures   Objective Measures Objective Measure 1   Objective Measure 1   Objective Measure plank   Details 30 sec with slight piked hips - improve with TC and VC   Treatment Interventions (PT)   Interventions Therapeutic Procedure/Exercise;Neuromuscular Re-education   Therapeutic Procedure/Exercise   Therapeutic Procedures: strength, endurance, ROM, flexibility minutes (39910) 12    PTRx Ther Proc 1 Neutral Spine Slouch Overcorrect   PTRx Ther Proc 1 - Details patient ed in clinic with demonstration for set-up   PTRx Ther Proc 2 Prone On Elbows   PTRx Ther Proc 2 - Details hep for mobility VR in clinic   PTRx Ther Proc 3 Single Knee to Chest   PTRx Ther Proc 3 - Details hep for mobility VR in clinic   PTRx Ther Proc 4 Step Standing Low Back Stretch   PTRx Ther Proc 4 - Details hep for mobility   PTRx Ther Proc 5 Supine Lumbar Hip Roll   PTRx Ther Proc 5 - Details hep for mobility   PTRx Ther Proc 6 Supine Abdominal Exercise #3B (Two Leg Marching)   PTRx Ther Proc 6 - Details 2 x 8-10 each side in clinic with VC and demonstration for set-up   Skilled Intervention see above   Patient Response/Progress patient tolerated well   PTRx Ther Proc 7 Prone Plank   PTRx Ther Proc 7 - Details 2 x 30 sec in clinic with VC and TC for set-up   PTRx Ther Proc 8 Side Plank   PTRx Ther Proc 8 - Details x 30 sec each side in clinic with VC for set-up   PTRx Ther Proc 9 Paloff Press - D2 Lift variation   PTRx Ther Proc 9 - Details hep for strengthening VR in clinic   Therapeutic Activity   Therapeutic Activities: dynamic activities to improve functional performance minutes (86656) 6   PTRx Ther Act 1 Posture Correction with Lumbar Roll   PTRx Ther Act 1 - Details VR in clinic to use while sitting trial in clinic   PTRx Ther Act 2 Education Sheet General   PTRx Ther Act 2 - Details hep:getting back to activity as quickly as possible to help with low back healing start with 15-30 min of walking each day   Skilled Intervention see above   Patient Response/Progress patient reports understanding   Neuromuscular Re-education   Neuromuscular re-ed of mvmt, balance, coord, kinesthetic sense, posture, proprioception minutes (14999) 10   PTRx Neuro Re-ed 1 Diaphragmatic Breathing - with Object/Weight   PTRx Neuro Re-ed 1 - Details hep for NMR and VR in clinic   PTRx Neuro Re-ed 2 Abdominal Brace Transverse Abdominis    PTRx Neuro Re-ed 2 - Details hep for NMR and strengthening VR in clinic   PTRx Neuro Re-ed 3 Pallof Press   PTRx Neuro Re-ed 3 - Details x 10 each side in clinic with demonstration for set-up   Skilled Intervention see above   Patient Response/Progress patient requires TC, VC, and demonstration   PTRx Neuro Re-ed 4 Ball Exercise Seated Pelvic Tilt   PTRx Neuro Re-ed 4 - Details VR in clinic   Education   Learner/Method Patient   Plan   Home program see PTRX   Updates to plan of care n/a   Plan for next session review hep, manual as indicated, continue to progress LB mobility, progress core and hip strength   Total Session Time   Timed Code Treatment Minutes 28   Total Treatment Time (sum of timed and untimed services) 28       [facility]                                                                                   OUTPATIENT PHYSICAL THERAPY    PLAN OF TREATMENT FOR OUTPATIENT REHABILITATION   Patient's Last Name, First Name, Jackie Meier YOB: 1995   Provider's Name   [facility]   Medical Record No.  [id number]     Onset Date: 04/26/24 (date of order)  Start of Care Date: 04/29/24     Medical Diagnosis:  bilateral LBP      PT Treatment Diagnosis:    Plan of Treatment  Frequency/Duration: 1x every vs other week progressing to 1 x/month/ 2-3 months    Certification date from 09/23/24 to 12/16/24         See note for plan of treatment details and functional goals     Amanda Otero, PT                         I CERTIFY THE NEED FOR THESE SERVICES FURNISHED UNDER        THIS PLAN OF TREATMENT AND WHILE UNDER MY CARE     (Physician attestation of this document indicates review and certification of the therapy plan).              Referring Provider:  Natasha Mandel    Initial Assessment  See Epic Evaluation- Start of Care Date: 04/29/24            PLAN  Continue therapy per current plan of care.    Beginning/End Dates of  Progress Note Reporting Period:  7/29/2024 to 09/23/2024    Referring Provider:  Natasha Mandel

## 2024-09-24 ENCOUNTER — HOSPITAL ENCOUNTER (EMERGENCY)
Facility: CLINIC | Age: 29
Discharge: HOME | End: 2024-09-24
Payer: COMMERCIAL

## 2024-09-25 ENCOUNTER — OFFICE VISIT (OUTPATIENT)
Dept: URGENT CARE | Facility: URGENT CARE | Age: 29
End: 2024-09-25
Payer: COMMERCIAL

## 2024-09-25 VITALS
TEMPERATURE: 98.9 F | SYSTOLIC BLOOD PRESSURE: 113 MMHG | BODY MASS INDEX: 26.92 KG/M2 | OXYGEN SATURATION: 96 % | HEART RATE: 73 BPM | WEIGHT: 194.4 LBS | DIASTOLIC BLOOD PRESSURE: 73 MMHG

## 2024-09-25 DIAGNOSIS — K21.9 GASTROESOPHAGEAL REFLUX DISEASE, UNSPECIFIED WHETHER ESOPHAGITIS PRESENT: Primary | ICD-10-CM

## 2024-09-25 PROCEDURE — 99213 OFFICE O/P EST LOW 20 MIN: CPT | Performed by: EMERGENCY MEDICINE

## 2024-09-25 RX ORDER — ACETAMINOPHEN 500 MG
500-1000 TABLET ORAL EVERY 6 HOURS PRN
Qty: 30 TABLET | Refills: 0 | Status: SHIPPED | OUTPATIENT
Start: 2024-09-25

## 2024-09-25 RX ORDER — FAMOTIDINE 20 MG/1
20 TABLET, FILM COATED ORAL 2 TIMES DAILY
Qty: 28 TABLET | Refills: 0 | Status: SHIPPED | OUTPATIENT
Start: 2024-09-25 | End: 2024-10-09

## 2024-09-25 RX ORDER — ONDANSETRON 4 MG/1
4 TABLET, ORALLY DISINTEGRATING ORAL ONCE
Status: COMPLETED | OUTPATIENT
Start: 2024-09-25 | End: 2024-09-25

## 2024-09-25 RX ADMIN — ONDANSETRON 4 MG: 4 TABLET, ORALLY DISINTEGRATING ORAL at 16:32

## 2024-09-25 NOTE — PROGRESS NOTES
ssessment & Plan     Diagnosis:    ICD-10-CM    1. Gastroesophageal reflux disease, unspecified whether esophagitis present  K21.9 ondansetron (ZOFRAN ODT) ODT tab 4 mg     lidocaine (viscous) (XYLOCAINE) 2 % 15 mL, alum & mag hydroxide-simethicone (MAALOX) 15 mL GI Cocktail     famotidine (PEPCID) 20 MG tablet     acetaminophen (TYLENOL) 500 MG tablet        Medical Decision Making:  Jackie Medeiros is a 29 year old male who presents for evaluation of nausea, epigastric abdominal pain.  I considered a broad differential diagnosis for this patient including gastritis, PUD, GERD, cholecystitis, choledocholithiasis, biliary colic, pancreatitis, colonic or small bowel pathology, ACS, PE, esophageal rupture. Vital signs are within normal limits. Signs and symptoms here are consistent with gastritis.  Patient experienced relief here with GI cocktail. Given extent of relief with GI cocktail would not do a further workup including labs and/or ultrasound/CT for etiologies such as pancreatitis or cholecystitis. Patient with no tenderness in the RLQ or RUQ with deep palpation; negative Severino's sign. There are no signs of any serious etiologies as mentioned above or intraabdominal catastrophes.   Doubt perforated ulcer at this point.  Will refer back to primary and do scheduled medication for stomach acid reduction x 14 days. Consider endoscopy if further symptoms despite this.  Gastritis precautions given for home.      Kevin Thibodeaux PA-C  [facility]    Subjective     Jackie Medeiros is a 29 year old male who presents to clinic today for the following health issues:  Chief Complaint   Patient presents with    Urgent Care    Vomiting     Unable to hold anything down has been vomiting since yesterday morning around 8 am with headaches.    Eye Problem     Both eyes are itchy and irritated since the last 3 weeks.        HPI  Patient with history of GERD reports they have been experiencing epigastric  abdominal pain since last evening; thrpwing up a few times as well. They note this seems to be exacerbated by laying down and with eating. Patient notes trying Maalox and pepto-bismol with mild improvement in symptoms. Has history of They note the pain does not radiate to the back or flank. Patient notes he has been taking Ibuprofen for headaches recently.     No dark stools up until this morning but he notes taking Pepto-bismol last night.     Patient denies any chest pain, diarrhea, bloody stools or emesis, hemoptysis, shortness of breath, back or flank pain, fevers, urinary symptoms, lightheadedness, numbness or weakness.     Review of Systems    See HPI    Objective      Vitals: /73 (BP Location: Left arm, Patient Position: Sitting, Cuff Size: Adult Large)   Pulse 73   Temp 98.9  F (37.2  C) (Tympanic)   Wt 88.2 kg (194 lb 6.4 oz)   SpO2 96%   BMI 26.92 kg/m        Patient Vitals for the past 24 hrs:   BP Temp Temp src Pulse SpO2 Weight   09/25/24 1608 113/73 98.9  F (37.2  C) Tympanic 73 96 % 88.2 kg (194 lb 6.4 oz)       Vital signs reviewed by: Kevin Thibodeaux PA-C    Physical Exam   Constitutional: Patient is alert and cooperative. No acute distress.  Cardiovascular: Regular rate and rhythm.   Pulmonary/Chest: Lungs are clear to auscultation throughout. Effort normal. No respiratory distress. No wheezes, rales or rhonchi. No point tenderness over the sternum or costosternal margins bilaterally.   GI: Epigastric tenderness to palpation without rebound or guarding. Negative Severino's sign. No McBurney point tenderness. No CVA tenderness bilaterally.   Neurological: Alert and oriented x3.   Psychiatric:The patient has a normal mood and affect.       Interventions:  GI Cocktail 30mL PO  Zofran 4mg PO      Kevin Thibodeaux PA-C, September 25, 2024

## 2024-10-01 ENCOUNTER — MYC REFILL (OUTPATIENT)
Dept: FAMILY MEDICINE | Facility: CLINIC | Age: 29
End: 2024-10-01
Payer: COMMERCIAL

## 2024-10-01 DIAGNOSIS — L65.9 HAIR LOSS: ICD-10-CM

## 2024-10-07 DIAGNOSIS — K21.9 GASTROESOPHAGEAL REFLUX DISEASE, UNSPECIFIED WHETHER ESOPHAGITIS PRESENT: ICD-10-CM

## 2024-10-09 RX ORDER — FAMOTIDINE 20 MG/1
20 TABLET, FILM COATED ORAL 2 TIMES DAILY
Qty: 28 TABLET | Refills: 0 | Status: SHIPPED | OUTPATIENT
Start: 2024-10-09 | End: 2024-10-25

## 2024-10-22 ENCOUNTER — OFFICE VISIT (OUTPATIENT)
Dept: INTERNAL MEDICINE | Facility: CLINIC | Age: 29
End: 2024-10-22
Payer: COMMERCIAL

## 2024-10-22 VITALS
RESPIRATION RATE: 16 BRPM | DIASTOLIC BLOOD PRESSURE: 82 MMHG | TEMPERATURE: 97.3 F | HEART RATE: 76 BPM | WEIGHT: 198 LBS | SYSTOLIC BLOOD PRESSURE: 124 MMHG | OXYGEN SATURATION: 95 % | HEIGHT: 71 IN | BODY MASS INDEX: 27.72 KG/M2

## 2024-10-22 DIAGNOSIS — Z11.3 SCREEN FOR STD (SEXUALLY TRANSMITTED DISEASE): ICD-10-CM

## 2024-10-22 DIAGNOSIS — G89.29 CHRONIC MIDLINE THORACIC BACK PAIN: ICD-10-CM

## 2024-10-22 DIAGNOSIS — M54.6 CHRONIC MIDLINE THORACIC BACK PAIN: ICD-10-CM

## 2024-10-22 LAB
ALBUMIN UR-MCNC: NEGATIVE MG/DL
APPEARANCE UR: CLEAR
BILIRUB UR QL STRIP: NEGATIVE
COLOR UR AUTO: YELLOW
GLUCOSE UR STRIP-MCNC: NEGATIVE MG/DL
HCV AB SERPL QL IA: NONREACTIVE
HGB UR QL STRIP: NEGATIVE
HIV 1+2 AB+HIV1 P24 AG SERPL QL IA: NONREACTIVE
KETONES UR STRIP-MCNC: NEGATIVE MG/DL
LEUKOCYTE ESTERASE UR QL STRIP: NEGATIVE
NITRATE UR QL: NEGATIVE
PH UR STRIP: 6 [PH] (ref 5–7)
SP GR UR STRIP: 1.02 (ref 1–1.03)
T PALLIDUM AB SER QL: NONREACTIVE
UROBILINOGEN UR STRIP-ACNC: 0.2 E.U./DL

## 2024-10-22 PROCEDURE — 87491 CHLMYD TRACH DNA AMP PROBE: CPT | Performed by: INTERNAL MEDICINE

## 2024-10-22 PROCEDURE — 86803 HEPATITIS C AB TEST: CPT | Performed by: INTERNAL MEDICINE

## 2024-10-22 PROCEDURE — 36415 COLL VENOUS BLD VENIPUNCTURE: CPT | Performed by: INTERNAL MEDICINE

## 2024-10-22 PROCEDURE — 87389 HIV-1 AG W/HIV-1&-2 AB AG IA: CPT | Performed by: INTERNAL MEDICINE

## 2024-10-22 PROCEDURE — 81003 URINALYSIS AUTO W/O SCOPE: CPT | Performed by: INTERNAL MEDICINE

## 2024-10-22 PROCEDURE — 87591 N.GONORRHOEAE DNA AMP PROB: CPT | Performed by: INTERNAL MEDICINE

## 2024-10-22 PROCEDURE — 99213 OFFICE O/P EST LOW 20 MIN: CPT | Performed by: INTERNAL MEDICINE

## 2024-10-22 PROCEDURE — 86780 TREPONEMA PALLIDUM: CPT | Performed by: INTERNAL MEDICINE

## 2024-10-22 ASSESSMENT — PAIN SCALES - GENERAL: PAINLEVEL: MODERATE PAIN (4)

## 2024-10-22 NOTE — PROGRESS NOTES
"  Assessment & Plan     Chronic midline thoracic back pain  UA ordered  Continue with Physical therapy and OTC Ibuprofen as needed    Screen for STD (sexually transmitted disease)  Testing ordered.              BMI  Estimated body mass index is 28.01 kg/m  as calculated from the following:    Height as of this encounter: 1.791 m (5' 10.5\").    Weight as of this encounter: 89.8 kg (198 lb).             Jayleen Mejia is a 29 year old, presenting for the following health issues:  Urinary Problem (Pt wonders in UTI , had similar symptoms in [place] May 2023,  Low back pain, no burning, no blood, no more frequency than usual ) and STD (Screening to be precautious after an encounter  )        10/22/2024     2:11 PM   Additional Questions   Roomed by Alyssia HOOVER     STD    History of Present Illness       Reason for visit:  Allan He is missing 2 dose(s) of medications per week.       Has been having back pain for several months, was seen in April and had Xray of the lumbar spine which was normal.  Was advised Physical therapy, he says he has follow up for this.    Denies any falls or back injury.    Pain is located in mid back and lower back and flanks.  States he was told he had a urine infection in [facility] and was given antibiotics which had helped at that time, so he wants to recheck UA.    Also wants STD Check, no prior H/O UTI and he denies any current symptoms.              Review of Systems  Constitutional, neuro, ENT, endocrine, pulmonary, cardiac, gastrointestinal, genitourinary, musculoskeletal, integument and psychiatric systems are negative, except as otherwise noted.      Objective    /82 (BP Location: Right arm, Patient Position: Chair, Cuff Size: Adult Regular)   Pulse 76   Temp 97.3  F (36.3  C) (Temporal)   Resp 16   Ht 1.791 m (5' 10.5\")   Wt 89.8 kg (198 lb)   SpO2 95%   BMI 28.01 kg/m    Body mass index is 28.01 kg/m .  Physical Exam   GENERAL: alert and no distress  NECK: no adenopathy, " no asymmetry, masses, or scars  RESP: lungs clear to auscultation - no rales, rhonchi or wheezes  CV: regular rate and rhythm, normal S1 S2, no S3 or S4, no murmur, click or rub, no peripheral edema  ABDOMEN: soft, nontender, no hepatosplenomegaly, no masses and bowel sounds normal  MS: no gross musculoskeletal defects noted, no edema  Back: mild tenderness over lower thoracic spine and flanks.  SLR negative bilaterally.            Signed Electronically by: Olive Reed MD

## 2024-10-23 LAB
C TRACH DNA SPEC QL NAA+PROBE: POSITIVE
N GONORRHOEA DNA SPEC QL NAA+PROBE: NEGATIVE

## 2024-10-24 ENCOUNTER — MYC MEDICAL ADVICE (OUTPATIENT)
Dept: INTERNAL MEDICINE | Facility: CLINIC | Age: 29
End: 2024-10-24
Payer: COMMERCIAL

## 2024-10-24 DIAGNOSIS — A74.9 CHLAMYDIA INFECTION: Primary | ICD-10-CM

## 2024-10-24 RX ORDER — LEVOFLOXACIN 500 MG/1
500 TABLET, FILM COATED ORAL DAILY
Qty: 7 TABLET | Refills: 0 | Status: SHIPPED | OUTPATIENT
Start: 2024-10-24 | End: 2024-10-31

## 2024-10-24 NOTE — TELEPHONE ENCOUNTER
Called and spoke to patient.  Levaquin prescribed as He is allergic to Minocycline.  He will repeat test in a month.

## 2024-10-25 ENCOUNTER — MYC REFILL (OUTPATIENT)
Dept: FAMILY MEDICINE | Facility: CLINIC | Age: 29
End: 2024-10-25

## 2024-10-25 ENCOUNTER — OFFICE VISIT (OUTPATIENT)
Dept: FAMILY MEDICINE | Facility: CLINIC | Age: 29
End: 2024-10-25
Payer: COMMERCIAL

## 2024-10-25 VITALS
BODY MASS INDEX: 27.96 KG/M2 | HEART RATE: 74 BPM | RESPIRATION RATE: 19 BRPM | OXYGEN SATURATION: 98 % | HEIGHT: 71 IN | WEIGHT: 199.7 LBS | TEMPERATURE: 98.1 F | DIASTOLIC BLOOD PRESSURE: 87 MMHG | SYSTOLIC BLOOD PRESSURE: 129 MMHG

## 2024-10-25 DIAGNOSIS — J30.2 SEASONAL ALLERGIES: ICD-10-CM

## 2024-10-25 DIAGNOSIS — M54.50 BILATERAL LOW BACK PAIN WITHOUT SCIATICA, UNSPECIFIED CHRONICITY: Primary | ICD-10-CM

## 2024-10-25 DIAGNOSIS — G47.09 OTHER INSOMNIA: ICD-10-CM

## 2024-10-25 DIAGNOSIS — E55.9 VITAMIN D DEFICIENCY: ICD-10-CM

## 2024-10-25 DIAGNOSIS — F34.0 CYCLOTHYMIC DISORDER: ICD-10-CM

## 2024-10-25 PROCEDURE — 99214 OFFICE O/P EST MOD 30 MIN: CPT | Performed by: NURSE PRACTITIONER

## 2024-10-25 PROCEDURE — G2211 COMPLEX E/M VISIT ADD ON: HCPCS | Performed by: NURSE PRACTITIONER

## 2024-10-25 RX ORDER — LORATADINE 10 MG/1
10 TABLET ORAL DAILY
Qty: 30 TABLET | Refills: 1 | OUTPATIENT
Start: 2024-10-25

## 2024-10-25 RX ORDER — LORATADINE 10 MG/1
10 TABLET ORAL DAILY
Qty: 90 TABLET | Refills: 1 | Status: SHIPPED | OUTPATIENT
Start: 2024-10-25

## 2024-10-25 RX ORDER — MELOXICAM 7.5 MG/1
7.5 TABLET ORAL DAILY
COMMUNITY
Start: 2024-05-24

## 2024-10-25 RX ORDER — ERGOCALCIFEROL 1.25 MG/1
50000 CAPSULE, LIQUID FILLED ORAL WEEKLY
Qty: 8 CAPSULE | Refills: 0 | Status: SHIPPED | OUTPATIENT
Start: 2024-10-25

## 2024-10-25 ASSESSMENT — ANXIETY QUESTIONNAIRES
GAD7 TOTAL SCORE: 9
6. BECOMING EASILY ANNOYED OR IRRITABLE: NEARLY EVERY DAY
7. FEELING AFRAID AS IF SOMETHING AWFUL MIGHT HAPPEN: NOT AT ALL
3. WORRYING TOO MUCH ABOUT DIFFERENT THINGS: MORE THAN HALF THE DAYS
2. NOT BEING ABLE TO STOP OR CONTROL WORRYING: NOT AT ALL
1. FEELING NERVOUS, ANXIOUS, OR ON EDGE: NOT AT ALL
GAD7 TOTAL SCORE: 9
5. BEING SO RESTLESS THAT IT IS HARD TO SIT STILL: NEARLY EVERY DAY

## 2024-10-25 ASSESSMENT — PATIENT HEALTH QUESTIONNAIRE - PHQ9: 5. POOR APPETITE OR OVEREATING: SEVERAL DAYS

## 2024-10-25 ASSESSMENT — ENCOUNTER SYMPTOMS: BACK PAIN: 1

## 2024-10-25 NOTE — PATIENT INSTRUCTIONS
Take meds as prescribed.   MyChart me when done with Levaquin.  I will prescribe Seroquel 25 mg once at bedtime at that time.  Cannot take Levaquin with Seroquel.  Continue with PT and walking daily.   Gentle stretches.       It takes time for musculoskeletal injuries to heal.  Things you can do to help include:  1. Ice, Heat, Ice alternating every 15-20 minutes.  End on ice.    2. Gentle stretches  3. Antiinflammatory pain medication like ibuprofen or naprosyn which are available over the counter.    4. There is also a cream version called voltaren (diclofenac).  Topical medication like lidocaine (salonpas is one brand) or menthol (icy hot is one brand).  5. Muscle relaxants can also be helpful for muscle tightness and pain.  Take at bedtime for rest and comfort.

## 2024-10-25 NOTE — PROGRESS NOTES
Assessment & Plan   (M54.50) Bilateral low back pain without sciatica, unspecified chronicity  (primary encounter diagnosis)  Comment: See patient instructions.      (E55.9) Vitamin D deficiency  Comment: Start Vitamin D 50,000 international unit(s) once per week for 8 weeks.    Plan: vitamin D2 (ERGOCALCIFEROL) 69607 units (1250         mcg) capsule    (J30.2) Seasonal allergies  Comment: Restart Claritin.  Plan: loratadine (CLARITIN) 10 MG tablet    (F34.0) Cyclothymic disorder  Comment: Will start Seroquel 25 mg once at bedtime once he is done with Levaquin.  He should MyChart me next week for script.      (G47.09) Other insomnia  Comment: Continue with current medication regimen.  Could try Tylenol PM.  Work on good sleep hygiene and routine.          Patient Instructions   Take meds as prescribed.   MyChart me when done with Levaquin.  I will prescribe Seroquel 25 mg once at bedtime at that time.  Cannot take Levaquin with Seroquel.  Continue with PT and walking daily.   Gentle stretches.     It takes time for musculoskeletal injuries to heal.  Things you can do to help include:  1. Ice, Heat, Ice alternating every 15-20 minutes.  End on ice.    2. Gentle stretches  3. Antiinflammatory pain medication like ibuprofen or naprosyn which are available over the counter.    4. There is also a cream version called voltaren (diclofenac).  Topical medication like lidocaine (salonpas is one brand) or menthol (icy hot is one brand).  5. Muscle relaxants can also be helpful for muscle tightness and pain.  Take at bedtime for rest and comfort.       The longitudinal plan of care for the diagnosis(es)/condition(s) as documented were addressed during this visit. Due to the added complexity in care, I will continue to support Jackie in the subsequent management and with ongoing continuity of care.       BMI  Estimated body mass index is 28.25 kg/m  as calculated from the following:    Height as of this encounter: 1.791 m  "(5' 10.5\").    Weight as of this encounter: 90.6 kg (199 lb 11.2 oz).   Weight management plan: Discussed healthy diet and exercise guidelines        Jayleen Mejia is a 29 year old, presenting for the following health issues:  Back Pain (C/O back pain from mid level to lower back since 4/24, has had testing and X-ray done), Sleep concerns (C/O sleep concerns started 10/13 , low energy, unable to stay asleep or go to sleep, changes in weather affects sleep pattern and causing headaches, possible insomnia ), and Establish Care        10/25/2024     8:28 AM   Additional Questions   Roomed by Nevaeh   Accompanied by Self     History of Present Illness       Reason for visit:  Allan He is missing 2 dose(s) of medications per week.     Pain History:  When did you first notice your pain? 4/24   Have you seen this provider for your pain in the past? Yes   Where in your body do you have pain? Back pain; lower back pain.  Are you seeing anyone else for your pain? No. Had assessment in April 2024.  Xray was normal.  Got meds and started PT.  Will start PT tomorrow.  No trauma, injury, or fall.  Rates pain 5/10.  Describes pain as tightness and throbbing pain.  Laying down increases pain.               PDMP Review         Value Time User    State PDMP site checked  Yes 10/25/2024  9:22 AM Brittny Dinh APRN CNP          Last CSA Agreement:   CSA -- Patient Level:    CSA: None found at the patient level.             Sleep concerns:  Difficulty falling asleep.  Would like to restart Seroquel.  He will get into bed to try to fall asleep, but cannot.  He will eventually fall asleep around 3am, but needs to get up around 6am for the day.  This has been going on for the 2 weeks.  There are times this will happen 5 months at a time.  Change of season causes increased difficulty with sleeping.  All this started while in high school years ago. Took Seroquel 25 mg once daily at HS which was helpful.  Would like to get a " "refill of this medicine.      He .  Has 5 children.  He works in [employer].  Thinks about going back to school.  Busy with family and work.       Review of Systems  Constitutional, neuro, ENT, endocrine, pulmonary, cardiac, gastrointestinal, genitourinary, musculoskeletal, integument and psychiatric systems are negative, except as otherwise noted.        Objective    /87 (BP Location: Right arm, Patient Position: Sitting, Cuff Size: Adult Regular)   Pulse 74   Temp 98.1  F (36.7  C) (Oral)   Resp 19   Ht 1.791 m (5' 10.5\")   Wt 90.6 kg (199 lb 11.2 oz)   SpO2 98%   BMI 28.25 kg/m    Body mass index is 28.25 kg/m .  Physical Exam  Constitutional:       General: He is not in acute distress.     Appearance: Normal appearance. He is not ill-appearing.   HENT:      Head: Normocephalic.      Right Ear: Tympanic membrane, ear canal and external ear normal. There is no impacted cerumen.      Left Ear: Tympanic membrane, ear canal and external ear normal. There is no impacted cerumen.      Nose: Congestion and rhinorrhea present.      Comments: Mild congestion and clear rhinorrhea.      Mouth/Throat:      Mouth: Mucous membranes are moist.      Pharynx: Oropharynx is clear. No oropharyngeal exudate or posterior oropharyngeal erythema.   Eyes:      General: No scleral icterus.        Right eye: No discharge.         Left eye: No discharge.      Conjunctiva/sclera: Conjunctivae normal.   Neck:      Comments: Thyroid is normal shape and size and smooth.  No nodules or enlargement noted.  No pain noted.              Cardiovascular:      Rate and Rhythm: Normal rate and regular rhythm.      Heart sounds: Normal heart sounds. No murmur heard.     No friction rub. No gallop.   Pulmonary:      Effort: Pulmonary effort is normal. No respiratory distress.      Breath sounds: Normal breath sounds. No wheezing, rhonchi or rales.   Musculoskeletal:         General: Normal range of motion.      Cervical back: " Normal range of motion and neck supple. No rigidity or tenderness.      Comments: Tightness of low back area.  Muscle spasm noted on both sides of spine in mid to law back area.  Able to walk with out difficutly.  Able to walk on toes.  Able to walk on heals.  Straight leg raise is negative.  Reflexes are normal.  Strength is strong and equal.  No leaking of bowel or bladder.      Lymphadenopathy:      Cervical: No cervical adenopathy.   Skin:     General: Skin is warm and dry.      Findings: No rash.   Neurological:      General: No focal deficit present.      Mental Status: He is alert.   Psychiatric:         Mood and Affect: Mood normal.         Behavior: Behavior normal.         Thought Content: Thought content normal.         Judgment: Judgment normal.              Signed Electronically by: SWETA Alvarez CNP

## 2024-10-28 ENCOUNTER — MYC MEDICAL ADVICE (OUTPATIENT)
Dept: FAMILY MEDICINE | Facility: CLINIC | Age: 29
End: 2024-10-28
Payer: COMMERCIAL

## 2024-10-28 DIAGNOSIS — F34.0 CYCLOTHYMIC DISORDER: ICD-10-CM

## 2024-10-28 DIAGNOSIS — G47.09 OTHER INSOMNIA: Primary | ICD-10-CM

## 2024-10-29 NOTE — TELEPHONE ENCOUNTER
Per [employer], No interactions found between levofloxacin and vitamin d2.     Per Up to Date, high iron or high calcium containing foods can decrease the absorption of Levofloxacin. It is ok to eat meat/dairy products during the course of treatment, however it's recommended to wait at least 2 hours before or after eating these foods and taking the levofloxacin.    Sent [employer] message to patient.    Janis Hines RN

## 2024-10-31 RX ORDER — QUETIAPINE FUMARATE 25 MG/1
25 TABLET, FILM COATED ORAL AT BEDTIME
Qty: 30 TABLET | Refills: 1 | Status: SHIPPED | OUTPATIENT
Start: 2024-10-31

## 2024-11-11 DIAGNOSIS — M54.50 BILATERAL LOW BACK PAIN WITHOUT SCIATICA, UNSPECIFIED CHRONICITY: ICD-10-CM

## 2024-11-18 ENCOUNTER — DOCUMENTATION ONLY (OUTPATIENT)
Dept: INTERNAL MEDICINE | Facility: CLINIC | Age: 29
End: 2024-11-18

## 2024-11-18 NOTE — PROGRESS NOTES
Jackie MALHOTRA Robertrickrosa has an upcoming lab appointment:    Future Appointments   Date Time Provider Department Center   11/18/2024  3:15 PM [facility]   11/26/2024 10:00 AM Brittny Dinh APRN CNP [facility]   12/4/2024 10:30 AM Anika Randall PA-C [facility]   [facility]2025 12:30 [facility]     Patient is scheduled for the following lab(s): Scheduling Notes:Full lab work, which includes STD/STI . I want to check for HIV all that stuff  all STD urine test all that.  Made On:11/17/2024 4:56 PM  By:  SHERRI WHITLOCK      There is no order available. Please review and place either future orders or HMPO (Review of Health Maintenance Protocol Orders), as appropriate.    There are no preventive care reminders to display for this patient.  Arianna Garner

## 2024-11-18 NOTE — PROGRESS NOTES
Please call patient to relay message.      He had an STD check in October 2024.  The only test that needs to be rechecked is the chlamydia test.  This was ordered per another provider.      All other labs are due after April 2025 when he has his annual physical.  If he has any illness or issues, then he needs a follow-up appointment to discuss additional labs.      Hope this makes sense.      Take care,SWETA Alvarez CNP

## 2024-11-18 NOTE — PROGRESS NOTES
Called and spoke with pt,     Relayed the message below that the only lab he has ordered is to recheck chlamydia lab and that it was to be completed on 11/24.     Pt has an appointment scheduled with provider on 11/26 and would like lab completed at that time. -Cancelled lab appointment scheduled on 11/18.     Pt verbalizes understanding and is agreeable with plan. Pt denies any further questions or concerns at this time.     Mandy ROCHE RN   Clinic RN  [facility]

## 2024-11-25 PROBLEM — M54.50 BILATERAL LOW BACK PAIN WITHOUT SCIATICA, UNSPECIFIED CHRONICITY: Status: RESOLVED | Noted: 2024-04-29 | Resolved: 2024-11-25

## 2024-11-25 SDOH — HEALTH STABILITY: PHYSICAL HEALTH: ON AVERAGE, HOW MANY DAYS PER WEEK DO YOU ENGAGE IN MODERATE TO STRENUOUS EXERCISE (LIKE A BRISK WALK)?: 5 DAYS

## 2024-11-25 SDOH — HEALTH STABILITY: PHYSICAL HEALTH: ON AVERAGE, HOW MANY MINUTES DO YOU ENGAGE IN EXERCISE AT THIS LEVEL?: 60 MIN

## 2024-11-25 ASSESSMENT — SOCIAL DETERMINANTS OF HEALTH (SDOH): HOW OFTEN DO YOU GET TOGETHER WITH FRIENDS OR RELATIVES?: TWICE A WEEK

## 2024-11-26 ENCOUNTER — OFFICE VISIT (OUTPATIENT)
Dept: FAMILY MEDICINE | Facility: CLINIC | Age: 29
End: 2024-11-26
Payer: COMMERCIAL

## 2024-11-26 VITALS
SYSTOLIC BLOOD PRESSURE: 116 MMHG | HEIGHT: 71 IN | WEIGHT: 204.7 LBS | TEMPERATURE: 97.6 F | OXYGEN SATURATION: 96 % | RESPIRATION RATE: 20 BRPM | HEART RATE: 67 BPM | BODY MASS INDEX: 28.66 KG/M2 | DIASTOLIC BLOOD PRESSURE: 76 MMHG

## 2024-11-26 DIAGNOSIS — Z11.3 SCREEN FOR STD (SEXUALLY TRANSMITTED DISEASE): ICD-10-CM

## 2024-11-26 DIAGNOSIS — R73.09 ELEVATED GLUCOSE: ICD-10-CM

## 2024-11-26 DIAGNOSIS — A74.9 CHLAMYDIA INFECTION: ICD-10-CM

## 2024-11-26 DIAGNOSIS — F34.0 CYCLOTHYMIC DISORDER: ICD-10-CM

## 2024-11-26 DIAGNOSIS — E55.9 VITAMIN D DEFICIENCY: ICD-10-CM

## 2024-11-26 DIAGNOSIS — Z00.00 ROUTINE GENERAL MEDICAL EXAMINATION AT A HEALTH CARE FACILITY: Primary | ICD-10-CM

## 2024-11-26 DIAGNOSIS — G47.09 OTHER INSOMNIA: ICD-10-CM

## 2024-11-26 LAB
EST. AVERAGE GLUCOSE BLD GHB EST-MCNC: 111 MG/DL
HBA1C MFR BLD: 5.5 % (ref 0–5.6)

## 2024-11-26 PROCEDURE — 87591 N.GONORRHOEAE DNA AMP PROB: CPT | Performed by: NURSE PRACTITIONER

## 2024-11-26 PROCEDURE — 99213 OFFICE O/P EST LOW 20 MIN: CPT | Mod: 25 | Performed by: NURSE PRACTITIONER

## 2024-11-26 PROCEDURE — 87491 CHLMYD TRACH DNA AMP PROBE: CPT | Performed by: NURSE PRACTITIONER

## 2024-11-26 PROCEDURE — 36415 COLL VENOUS BLD VENIPUNCTURE: CPT | Performed by: NURSE PRACTITIONER

## 2024-11-26 PROCEDURE — 99395 PREV VISIT EST AGE 18-39: CPT | Performed by: NURSE PRACTITIONER

## 2024-11-26 PROCEDURE — 83036 HEMOGLOBIN GLYCOSYLATED A1C: CPT | Performed by: NURSE PRACTITIONER

## 2024-11-26 RX ORDER — QUETIAPINE FUMARATE 25 MG/1
25 TABLET, FILM COATED ORAL AT BEDTIME
Qty: 90 TABLET | Refills: 1 | Status: SHIPPED | OUTPATIENT
Start: 2024-11-26

## 2024-11-26 RX ORDER — CHOLECALCIFEROL (VITAMIN D3) 50 MCG
1 TABLET ORAL DAILY
Qty: 90 TABLET | Refills: 1 | Status: SHIPPED | OUTPATIENT
Start: 2024-11-26

## 2024-11-26 ASSESSMENT — PAIN SCALES - GENERAL: PAINLEVEL_OUTOF10: NO PAIN (0)

## 2024-11-26 NOTE — PROGRESS NOTES
Preventive Care Visit  [facility]  SWETA Alvarez CNP, Family Medicine  Nov 26, 2024          Assessment & Plan   (Z00.00) Routine general medical examination at a health care facility  (primary encounter diagnosis)  Comment: See patient instructions.     (Z11.3) Screen for STD (sexually transmitted disease)  Comment:   Plan: Chlamydia trachomatis/Neisseria gonorrhoeae by         PCR - Clinic Collect    (A74.9) Chlamydia infection  Comment: Re-test today.     (R73.09) Elevated glucose  Comment: Screening for diabetes.  Plan: Hemoglobin A1c    (G47.09) Other insomnia  Comment: Recommend taking Seroquel every night consistently.  Follow-up in 2 months.   Plan: QUEtiapine (SEROQUEL) 25 MG tablet    (F34.0) Cyclothymic disorder  Comment: Stable.  Seroquel working well.   Refill.  Follow-up in 2 months.   Plan: QUEtiapine (SEROQUEL) 25 MG tablet    (E55.9) Vitamin D deficiency  Comment:   Plan: vitamin D3 (CHOLECALCIFEROL) 50 mcg (2000         units) tablet         Patient Instructions:   Labs today.   Seroquel every night at bedtime or when you go to sleep.  See me in 6 weeks for med check.         Patient has been advised of split billing requirements and indicates understanding: Yes      Counseling  Appropriate preventive services were addressed with this patient via screening, questionnaire, or discussion as appropriate for fall prevention, nutrition, physical activity, Tobacco-use cessation, social engagement, weight loss and cognition.  Checklist reviewing preventive services available has been given to the patient.  Reviewed patient's diet, addressing concerns and/or questions.   He is at risk for psychosocial distress and has been provided with information to reduce risk.         Jayleen Mejia is a 29 year old, presenting for the following:  Physical        11/26/2024     9:43 AM   Additional Questions   Roomed by sukhdev fuentes   Accompanied by self      HPI:  Sexually  active with wife who is in [place].  Had one change in partners and contracted chlamydia.  This was about 1 month ago.  Was treated and now needs retest.  Uses nothing for birth control or protection.  No concerns with sexual health today.  Plans to use condoms at all times.     Insomnia:  Sleep is better. Takes Seroquel at bedtime as needed.       Health Care Directive  Patient does not have a Health Care Directive: Discussed advance care planning with patient; information given to patient to review.        11/25/2024   General Health   How would you rate your overall physical health? Good   Feel stress (tense, anxious, or unable to sleep) To some extent      (!) STRESS CONCERN      11/25/2024   Nutrition   Three or more servings of calcium each day? (!) I DON'T KNOW   Diet: I don't know   How many servings of fruit and vegetables per day? (!) 0-1   How many sweetened beverages each day? (!) 3            11/25/2024   Exercise   Days per week of moderate/strenous exercise 5 days   Average minutes spent exercising at this level 60 min            11/25/2024   Social Factors   Frequency of gathering with friends or relatives Twice a week   Worry food won't last until get money to buy more Patient declined   Food not last or not have enough money for food? No   Do you have housing? (Housing is defined as stable permanent housing and does not include staying ouside in a car, in a tent, in an abandoned building, in an overnight shelter, or couch-surfing.) Yes   Are you worried about losing your housing? Patient declined   Lack of transportation? Yes   Unable to get utilities (heat,electricity)? Patient declined       (!) TRANSPORTATION CONCERN PRESENT      11/25/2024   Dental   Dentist two times every year? Yes            4/26/2024   TB Screening   Were you born outside of the [place]? Yes          Today's PHQ-2 Score:       4/26/2024     6:51 AM   PHQ-2 ( 1999 [employer])   Q1: Little interest or pleasure in doing things 0    Q2:  Feeling down, depressed or hopeless 0    PHQ-2 Score 0   Q1: Little interest or pleasure in doing things Not at all   Q2: Feeling down, depressed or hopeless Not at all   PHQ-2 Score 0       Patient-reported         11/25/2024   Substance Use   Alcohol more than 3/day or more than 7/wk Not Applicable   Do you use any other substances recreationally? No        Social History     Tobacco Use    Smoking status: Never    Smokeless tobacco: Never   Vaping Use    Vaping status: Never Used   Substance Use Topics    Alcohol use: No    Drug use: No             11/25/2024   STI Screening   New sexual partner(s) since last STI/HIV test? (!) YES Needs re-test.            11/25/2024   Contraception/Family Planning   Questions about contraception or family planning No             Reviewed and updated as needed this visit by Provider   Tobacco  Allergies  Meds  Problems  Med Hx  Surg Hx  Fam Hx            Past Medical History:   Diagnosis Date    Abnormal laboratory test 1/1/2012    Allergic state     Anemia 8/6/2013    Environmental allergies 1/6/2013    Vitamin D deficiency 1/24/14    vitamin D level 23       Past Surgical History:   Procedure Laterality Date    NO PAST SURGERIES         Patient Active Problem List   Diagnosis    GERD (gastroesophageal reflux disease)    Environmental allergies    Hyperlipidemia LDL goal <130    Vitamin D deficiency    Insomnia    Cyclothymic disorder    Elevated glucose     Past Surgical History:   Procedure Laterality Date    NO PAST SURGERIES         Social History     Tobacco Use    Smoking status: Never    Smokeless tobacco: Never   Substance Use Topics    Alcohol use: No     Family History   Problem Relation Age of Onset    Stomach Problem Mother     Kidney Disease Father     Diabetes Type 1 Sister     Hypothyroidism Sister     Seizure Disorder Sister     No Known Problems Sister     No Known Problems Sister     No Known Problems Sister     No Known Problems Brother     Family  History Negative No family hx of            Current Outpatient Medications   Medication Sig Dispense Refill    acetaminophen (TYLENOL) 500 MG tablet Take 1-2 tablets (500-1,000 mg) by mouth every 6 hours as needed for mild pain. 30 tablet 0    loratadine (CLARITIN) 10 MG tablet Take 1 tablet (10 mg) by mouth daily. 90 tablet 1    meloxicam (MOBIC) 7.5 MG tablet Take 7.5 mg by mouth daily.      QUEtiapine (SEROQUEL) 25 MG tablet Take 1 tablet (25 mg) by mouth at bedtime. 90 tablet 1    tiZANidine (ZANAFLEX) 4 MG tablet TAKE 1 TABLET(4 MG) BY MOUTH EVERY NIGHT AS NEEDED FOR MUSCLE SPASMS 30 tablet 0    vitamin D2 (ERGOCALCIFEROL) 90792 units (1250 mcg) capsule Take 1 capsule (50,000 Units) by mouth once a week. For 8 weeks. 8 capsule 0    vitamin D3 (CHOLECALCIFEROL) 50 mcg (2000 units) tablet Take 1 tablet (50 mcg) by mouth daily. 90 tablet 1       Allergies   Allergen Reactions    Dust Mites     Minocycline Hives       Review of Systems  CONSTITUTIONAL: NEGATIVE for fever, chills, change in weight  INTEGUMENTARY/SKIN: NEGATIVE for worrisome rashes, moles or lesions  EYES: NEGATIVE for vision changes or irritation  ENT/MOUTH: NEGATIVE for ear, mouth and throat problems  RESP: NEGATIVE for significant cough or SOB  BREAST: NEGATIVE for masses, tenderness or discharge  CV: NEGATIVE for chest pain, palpitations or peripheral edema  GI: NEGATIVE for nausea, abdominal pain, heartburn, or change in bowel habits  : NEGATIVE for frequency, dysuria, or hematuria  MUSCULOSKELETAL: NEGATIVE for significant arthralgias or myalgia  NEURO: NEGATIVE for weakness, dizziness or paresthesias  ENDOCRINE: NEGATIVE for temperature intolerance, skin/hair changes  HEME: NEGATIVE for bleeding problems  PSYCHIATRIC: NEGATIVE for changes in mood or affect       Objective    Exam  /76 (BP Location: Right arm, Patient Position: Chair, Cuff Size: Adult Large)   Pulse 67   Temp 97.6  F (36.4  C) (Temporal)   Resp 20   Ht 1.791 m (5'  "10.5\")   Wt 92.9 kg (204 lb 11.2 oz)   SpO2 96%   BMI 28.96 kg/m     Estimated body mass index is 28.96 kg/m  as calculated from the following:    Height as of this encounter: 1.791 m (5' 10.5\").    Weight as of this encounter: 92.9 kg (204 lb 11.2 oz).  Physical Exam  Constitutional:       General: He is not in acute distress.     Appearance: Normal appearance. He is obese. He is not ill-appearing.   HENT:      Head: Normocephalic.      Right Ear: Tympanic membrane, ear canal and external ear normal. There is no impacted cerumen.      Left Ear: Tympanic membrane, ear canal and external ear normal. There is no impacted cerumen.      Nose: Nose normal. No congestion or rhinorrhea.      Mouth/Throat:      Mouth: Mucous membranes are moist.      Pharynx: Oropharynx is clear. No oropharyngeal exudate or posterior oropharyngeal erythema.   Eyes:      General: No scleral icterus.        Right eye: No discharge.         Left eye: No discharge.      Extraocular Movements: Extraocular movements intact.      Conjunctiva/sclera: Conjunctivae normal.      Pupils: Pupils are equal, round, and reactive to light.   Neck:      Comments: Thyroid is normal shape and size and smooth.  No nodules or enlargement noted.  No pain noted.      Cardiovascular:      Rate and Rhythm: Normal rate and regular rhythm.      Heart sounds: Normal heart sounds. No murmur heard.     No friction rub. No gallop.   Pulmonary:      Effort: Pulmonary effort is normal. No respiratory distress.      Breath sounds: Normal breath sounds. No stridor. No wheezing, rhonchi or rales.   Chest:      Chest wall: No tenderness.   Abdominal:      General: Abdomen is flat. Bowel sounds are normal. There is no distension.      Palpations: Abdomen is soft. There is no mass.      Tenderness: There is no abdominal tenderness. There is no right CVA tenderness, left CVA tenderness, guarding or rebound.      Hernia: No hernia is present.   Genitourinary:     Penis: Normal.  "      Testes: Normal.      Comments: Genital exam:  No inguinal lymphadenopathy.  Circumcised.  Urethral opening on tip of glans.  No redness or discharge.  No rash. Testis descended bilaterally.  No lumps or masses.  No hernia or hydrocele noted.       Musculoskeletal:         General: No swelling, tenderness or deformity. Normal range of motion.      Cervical back: Normal range of motion and neck supple. No rigidity or tenderness.   Lymphadenopathy:      Cervical: No cervical adenopathy.   Skin:     General: Skin is warm and dry.      Capillary Refill: Capillary refill takes less than 2 seconds.      Coloration: Skin is not jaundiced.      Findings: No erythema or rash.   Neurological:      General: No focal deficit present.      Mental Status: He is alert and oriented to person, place, and time.      Cranial Nerves: No cranial nerve deficit.      Motor: No weakness.      Coordination: Coordination normal.      Gait: Gait normal.      Deep Tendon Reflexes: Reflexes normal.   Psychiatric:         Mood and Affect: Mood normal.         Behavior: Behavior normal.         Thought Content: Thought content normal.         Judgment: Judgment normal.         Signed Electronically by: SWETA Alvarez CNP

## 2024-11-26 NOTE — PATIENT INSTRUCTIONS
Labs today.   Seroquel every night at bedtime or when you go to sleep.  See me in 6 weeks for med check.       Patient Education   Preventive Care Advice   This is general advice given by our system to help you stay healthy. However, your care team may have specific advice just for you. Please talk to your care team about your preventive care needs.  Nutrition  Eat 5 or more servings of fruits and vegetables each day.  Try wheat bread, brown rice and whole grain pasta (instead of white bread, rice, and pasta).  Get enough calcium and vitamin D. Check the label on foods and aim for 100% of the RDA (recommended daily allowance).  Lifestyle  Exercise at least 150 minutes each week  (30 minutes a day, 5 days a week).  Do muscle strengthening activities 2 days a week. These help control your weight and prevent disease.  No smoking.  Wear sunscreen to prevent skin cancer.  Have a dental exam and cleaning every 6 months.  Yearly exams  See your health care team every year to talk about:  Any changes in your health.  Any medicines your care team has prescribed.  Preventive care, family planning, and ways to prevent chronic diseases.  Shots (vaccines)   HPV shots (up to age 26), if you've never had them before.  Hepatitis B shots (up to age 59), if you've never had them before.  COVID-19 shot: Get this shot when it's due.  Flu shot: Get a flu shot every year.  Tetanus shot: Get a tetanus shot every 10 years.  Pneumococcal, hepatitis A, and RSV shots: Ask your care team if you need these based on your risk.  Shingles shot (for age 50 and up)  General health tests  Diabetes screening:  Starting at age 35, Get screened for diabetes at least every 3 years.  If you are younger than age 35, ask your care team if you should be screened for diabetes.  Cholesterol test: At age 39, start having a cholesterol test every 5 years, or more often if advised.  Bone density scan (DEXA): At age 50, ask your care team if you should have this  scan for osteoporosis (brittle bones).  Hepatitis C: Get tested at least once in your life.  STIs (sexually transmitted infections)  Before age 24: Ask your care team if you should be screened for STIs.  After age 24: Get screened for STIs if you're at risk. You are at risk for STIs (including HIV) if:  You are sexually active with more than one person.  You don't use condoms every time.  You or a partner was diagnosed with a sexually transmitted infection.  If you are at risk for HIV, ask about PrEP medicine to prevent HIV.  Get tested for HIV at least once in your life, whether you are at risk for HIV or not.  Cancer screening tests  Cervical cancer screening: If you have a cervix, begin getting regular cervical cancer screening tests starting at age 21.  Breast cancer scan (mammogram): If you've ever had breasts, begin having regular mammograms starting at age 40. This is a scan to check for breast cancer.  Colon cancer screening: It is important to start screening for colon cancer at age 45.  Have a colonoscopy test every 10 years (or more often if you're at risk) Or, ask your provider about stool tests like a FIT test every year or Cologuard test every 3 years.  To learn more about your testing options, visit:   .  For help making a decision, visit:   https://bit.ly/ch33918.  Prostate cancer screening test: If you have a prostate, ask your care team if a prostate cancer screening test (PSA) at age 55 is right for you.  Lung cancer screening: If you are a current or former smoker ages 50 to 80, ask your care team if ongoing lung cancer screenings are right for you.  For informational purposes only. Not to replace the advice of your health care provider. Copyright   2023 [employer]. All rights reserved. Clinically reviewed by the [facility]. [employer] 891459 - REV 01/24.  Eating Healthy Foods: Care Instructions  With every meal, you can make healthy food choices. Try to  "eat a variety of fruits, vegetables, whole grains, lean proteins, and low-fat dairy products. This can help you get the right balance of nutrients, including vitamins and minerals. Small changes add up over time. You can start by adding one healthy food to your meals each day.    Try to make half your plate fruits and vegetables, one-fourth whole grains, and one-fourth lean proteins. Try including dairy with your meals.   Eat more fruits and vegetables. Try to have them with most meals and snacks.   Foods for healthy eating        Fruits   These can be fresh, frozen, canned, or dried.  Try to choose whole fruit rather than fruit juice.  Eat a variety of colors.        Vegetables   These can be fresh, frozen, canned, or dried.  Beans, peas, and lentils count too.        Whole grains   Choose whole-grain breads, cereals, and noodles.  Try brown rice.        Lean proteins   These can include lean meat, poultry, fish, and eggs.  You can also have tofu, beans, peas, lentils, nuts, and seeds.        Dairy   Try milk, yogurt, and cheese.  Choose low-fat or fat-free when you can.  If you need to, use lactose-free milk or fortified plant-based milk products, such as soy milk.        Water   Drink water when you're thirsty.  Limit sugar-sweetened drinks, including soda, fruit drinks, and sports drinks.  Where can you learn more?  Go to https://www.[employer].net/patiented  Enter T756 in the search box to learn more about \"Eating Healthy Foods: Care Instructions.\"  Current as of: September 20, 2023  Content Version: 14.2 2024 [facility] [employer].   Care instructions adapted under license by your healthcare professional. If you have questions about a medical condition or this instruction, always ask your healthcare professional. Healthwise, Incorporated disclaims any warranty or liability for your use of this information.    Learning About Stress  What is stress?     Stress is your body's response to a hard situation. " Your body can have a physical, emotional, or mental response. Stress is a fact of life for most people, and it affects everyone differently. What causes stress for you may not be stressful for someone else.  A lot of things can cause stress. You may feel stress when you go on a job interview, take a test, or run a race. This kind of short-term stress is normal and even useful. It can help you if you need to work hard or react quickly. For example, stress can help you finish an important job on time.  Long-term stress is caused by ongoing stressful situations or events. Examples of long-term stress include long-term health problems, ongoing problems at work, or conflicts in your family. Long-term stress can harm your health.  How does stress affect your health?  When you are stressed, your body responds as though you are in danger. It makes hormones that speed up your heart, make you breathe faster, and give you a burst of energy. This is called the fight-or-flight stress response. If the stress is over quickly, your body goes back to normal and no harm is done.  But if stress happens too often or lasts too long, it can have bad effects. Long-term stress can make you more likely to get sick, and it can make symptoms of some diseases worse. If you tense up when you are stressed, you may develop neck, shoulder, or low back pain. Stress is linked to high blood pressure and heart disease.  Stress also harms your emotional health. It can make you sanchez, tense, or depressed. Your relationships may suffer, and you may not do well at work or school.  What can you do to manage stress?  You can try these things to help manage stress:   Do something active. Exercise or activity can help reduce stress. Walking is a great way to get started. Even everyday activities such as housecleaning or yard work can help.  Try yoga or asya chi. These techniques combine exercise and meditation. You may need some training at first to learn  them.  Do something you enjoy. For example, listen to music or go to a movie. Practice your hobby or do volunteer work.  Meditate. This can help you relax, because you are not worrying about what happened before or what may happen in the future.  Do guided imagery. Imagine yourself in any setting that helps you feel calm. You can use online videos, books, or a teacher to guide you.  Do breathing exercises. For example:  From a standing position, bend forward from the waist with your knees slightly bent. Let your arms dangle close to the floor.  Breathe in slowly and deeply as you return to a standing position. Roll up slowly and lift your head last.  Hold your breath for just a few seconds in the standing position.  Breathe out slowly and bend forward from the waist.  Let your feelings out. Talk, laugh, cry, and express anger when you need to. Talking with supportive friends or family, a counselor, or a eduardo leader about your feelings is a healthy way to relieve stress. Avoid discussing your feelings with people who make you feel worse.  Write. It may help to write about things that are bothering you. This helps you find out how much stress you feel and what is causing it. When you know this, you can find better ways to cope.  What can you do to prevent stress?  You might try some of these things to help prevent stress:  Manage your time. This helps you find time to do the things you want and need to do.  Get enough sleep. Your body recovers from the stresses of the day while you are sleeping.  Get support. Your family, friends, and community can make a difference in how you experience stress.  Limit your news feed. Avoid or limit time on social media or news that may make you feel stressed.  Do something active. Exercise or activity can help reduce stress. Walking is a great way to get started.  Where can you learn more?  Go to https://www.healthwise.net/patiented  Enter N032 in the search box to learn more about  "\"Learning About Stress.\"  Current as of: October 24, 2023  Content Version: 14.2 2024 [facility] [place] [employer].   Care instructions adapted under license by your healthcare professional. If you have questions about a medical condition or this instruction, always ask your healthcare professional. Healthwise, Incorporated disclaims any warranty or liability for your use of this information.    Safer Sex: Care Instructions  Overview  Safer sex is a way to reduce your risk of getting a sexually transmitted infection (STI). It can also help prevent pregnancy.  Several products can help you practice safer sex and reduce your chance of STIs. One of the best is a condom. There are internal and external condoms. You can use a special rubber sheet (dental dam) for protection during oral sex. Disposable gloves can keep your hands from touching blood, semen, or other body fluids that can carry infections.  Remember that birth control methods such as diaphragms, IUDs, foams, and birth control pills do not stop you from getting STIs.  Follow-up care is a key part of your treatment and safety. Be sure to make and go to all appointments, and call your doctor if you are having problems. It's also a good idea to know your test results and keep a list of the medicines you take.  How can you care for yourself at home?  Think about getting vaccinated to help prevent hepatitis A, hepatitis B, and human papillomavirus (HPV). They can be spread through sex.  Use a condom every time you have sex. Use an external condom, which goes on the penis. Or use an internal condom, which goes into the vagina or anus.  Make sure you use the right size external condom. A condom that's too small can break easily. A condom that's too big can slip off during sex.  Use a new condom each time you have sex. Be careful not to poke a hole in the condom when you open the wrapper.  Don't use an internal condom and an external condom at the same time.  Never use " "petroleum jelly (such as Vaseline), grease, hand lotion, baby oil, or anything with oil in it. These products can make holes in the condom.  After intercourse, hold the edge of the condom as you remove it. This will help keep semen from spilling out of the condom.  Do not have sex with anyone who has symptoms of an STI, such as sores on the genitals or mouth.  Do not drink a lot of alcohol or use drugs before sex.  Limit your sex partners. Sex with one partner who has sex only with you can reduce your risk of getting an STI.  Don't share sex toys. But if you do share them, use a condom and clean the sex toys between each use.  Talk to any partners before you have sex. Talk about what you feel comfortable with and whether you have any boundaries with sex. And find out if your partner or partners may be at risk for any STI. Keep in mind that a person may be able to spread an STI even if they do not have symptoms. You and any partners may want to get tested for STIs.  Where can you learn more?  Go to https://www.[employer].net/patiented  Enter B608 in the search box to learn more about \"Safer Sex: Care Instructions.\"  Current as of: November 27, 2023  Content Version: 14.2 2024 [facility] [employer] [facility].   Care instructions adapted under license by your healthcare professional. If you have questions about a medical condition or this instruction, always ask your healthcare professional. Healthwise, Incorporated disclaims any warranty or liability for your use of this information.       "

## 2024-11-27 LAB
C TRACH DNA SPEC QL PROBE+SIG AMP: NEGATIVE
N GONORRHOEA DNA SPEC QL NAA+PROBE: NEGATIVE

## 2024-12-04 ENCOUNTER — LAB (OUTPATIENT)
Dept: LAB | Facility: CLINIC | Age: 29
End: 2024-12-04
Payer: COMMERCIAL

## 2024-12-04 ENCOUNTER — OFFICE VISIT (OUTPATIENT)
Dept: ALLERGY | Facility: CLINIC | Age: 29
End: 2024-12-04
Payer: COMMERCIAL

## 2024-12-04 VITALS — HEART RATE: 64 BPM | WEIGHT: 208.5 LBS | BODY MASS INDEX: 29.49 KG/M2 | OXYGEN SATURATION: 100 %

## 2024-12-04 DIAGNOSIS — J31.0 RHINOCONJUNCTIVITIS: ICD-10-CM

## 2024-12-04 DIAGNOSIS — J31.0 RHINOCONJUNCTIVITIS: Primary | ICD-10-CM

## 2024-12-04 DIAGNOSIS — H10.9 RHINOCONJUNCTIVITIS: Primary | ICD-10-CM

## 2024-12-04 DIAGNOSIS — H10.9 RHINOCONJUNCTIVITIS: ICD-10-CM

## 2024-12-04 DIAGNOSIS — T50.905A ADVERSE EFFECT OF DRUG, INITIAL ENCOUNTER: ICD-10-CM

## 2024-12-04 PROCEDURE — 36415 COLL VENOUS BLD VENIPUNCTURE: CPT

## 2024-12-04 PROCEDURE — 99204 OFFICE O/P NEW MOD 45 MIN: CPT

## 2024-12-04 RX ORDER — FEXOFENADINE HCL 180 MG/1
180 TABLET ORAL DAILY
Qty: 90 TABLET | Refills: 3 | Status: SHIPPED | OUTPATIENT
Start: 2024-12-04

## 2024-12-04 RX ORDER — DICLOFENAC SODIUM 100 MG/1
1 TABLET, EXTENDED RELEASE ORAL
COMMUNITY
Start: 2024-11-12

## 2024-12-04 RX ORDER — AZELASTINE 1 MG/ML
2 SPRAY, METERED NASAL 2 TIMES DAILY
Qty: 30 ML | Refills: 3 | Status: SHIPPED | OUTPATIENT
Start: 2024-12-04

## 2024-12-04 NOTE — LETTER
12/4/2024      Jackie Medeiros  [address] [place] [address]      Dear Colleague,    Thank you for referring your patient, Jackie Medeiros, to the [facility]. Please see a copy of my visit note below.    Jackie Medeiros was seen in the Allergy Clinic at [facility]    Jackie Medeiros is a 29 year old male  being seen today for ongoing evaluation of concern for allergies.  Patient is a self-referral.    History of Present Illness:       Rhinoconjunctivitis    The onset of symptoms: Oct 2024, patient reports during the month of October he had significant allergy symptoms.  He tried using loratadine, with only slight improvement of his symptoms.  Patient [place].  Perennial/seasonally-exacerbated (Fall) chronic nasal symptoms (itch, clear rhinorrhea, stuffiness, and sneezing), postnasal drip and ocular symptoms (itching).  Triggers identified include grass and mowing.Oral antihistamines loratadine have been used.  He reports he gets slightly tired from loratadine.  Denies use of nasal sprays.  The patient symptoms are currently 6% controlled. Comes back within a few hours of medications   There is no history of PE tubes, sinus surgeries, or tonsillectomy/adenoidectomy.     Medication allergy concern  Patient reports in the year of 2017 or 2018 he started taking minocycline for acne, he remembers about a week into his treatment plan he developed leg swelling, and had itchiness throughout his body.  Denies that he had any hives/mosquito bite-like lesions throughout the rest of his body.   [facility]      Question 12/4/2024 10:07 AM CST - Filed by Patient   Reason for visit: Medication allergies    Bee sting/ Insect allergy   Bee/ Insect Allergy    What symptoms have you experienced? Runny nose    Cough    Nausea    Vomiting   How soon did symptoms begin? More than an hour later   Environmental  and Social History    Place of Residence: [facility]   Do you have Central Air Conditioning? Yes   Type of Heating System: Radiator   Wood burning stove or fireplace: No   Occupation: student   Pets: No   Do you smoke cigarettes: No   Do you use an e-cigarette or vape? No   Does anyone living in your home smoke or vape? No   Family History    Do your parents, siblings, or children have asthma? No   Do your parents, siblings, or children have environmental allergies? No   Do your parents, siblings, or children have food allergies? No   Do your parents, siblings, or children have eczema?          Past Medical History:   Diagnosis Date     Abnormal laboratory test 1/1/2012     Allergic state      Anemia 8/6/2013     Environmental allergies 1/6/2013     Vitamin D deficiency 1/24/14    vitamin D level 23       Past Surgical History:   Procedure Laterality Date     NO PAST SURGERIES           Current Outpatient Medications:      acetaminophen (TYLENOL) 500 MG tablet, Take 1-2 tablets (500-1,000 mg) by mouth every 6 hours as needed for mild pain., Disp: 30 tablet, Rfl: 0     loratadine (CLARITIN) 10 MG tablet, Take 1 tablet (10 mg) by mouth daily., Disp: 90 tablet, Rfl: 1     meloxicam (MOBIC) 7.5 MG tablet, Take 7.5 mg by mouth daily., Disp: , Rfl:      QUEtiapine (SEROQUEL) 25 MG tablet, Take 1 tablet (25 mg) by mouth at bedtime., Disp: 90 tablet, Rfl: 1     tiZANidine (ZANAFLEX) 4 MG tablet, TAKE 1 TABLET(4 MG) BY MOUTH EVERY NIGHT AS NEEDED FOR MUSCLE SPASMS, Disp: 30 tablet, Rfl: 0     vitamin D2 (ERGOCALCIFEROL) 10509 units (1250 mcg) capsule, Take 1 capsule (50,000 Units) by mouth once a week. For 8 weeks., Disp: 8 capsule, Rfl: 0     vitamin D3 (CHOLECALCIFEROL) 50 mcg (2000 units) tablet, Take 1 tablet (50 mcg) by mouth daily., Disp: 90 tablet, Rfl: 1  Allergies   Allergen Reactions     Dust Mites      Minocycline Hives         Family History   Problem Relation Age of Onset     Stomach Problem Mother      Kidney  Disease Father      Diabetes Type 1 Sister      Hypothyroidism Sister      Seizure Disorder Sister      No Known Problems Sister      No Known Problems Sister      No Known Problems Sister      No Known Problems Brother      Family History Negative No family hx of      EXAM:   There were no vitals taken for this visit.    Physical Exam  Constitutional:       General: He is not in acute distress.     Appearance: Normal appearance. He is not ill-appearing.   HENT:      Nose: Congestion and rhinorrhea present. No nasal deformity or septal deviation.      Right Turbinates: Not enlarged, swollen or pale.      Left Turbinates: Not enlarged, swollen or pale.      Comments: Nasal turbinate enlargement, 80% obstruction.     Mouth/Throat:      Mouth: Mucous membranes are moist.      Pharynx: Oropharynx is clear. Uvula midline. No posterior oropharyngeal erythema.      Tonsils: 0 on the right. 0 on the left.   Eyes:      General: No allergic shiner.        Right eye: No discharge.         Left eye: No discharge.      Conjunctiva/sclera: Conjunctivae normal.   Cardiovascular:      Rate and Rhythm: Normal rate and regular rhythm.      Heart sounds: Normal heart sounds, S1 normal and S2 normal.   Pulmonary:      Effort: Pulmonary effort is normal. No prolonged expiration.      Breath sounds: Normal breath sounds. No decreased air movement. No wheezing.   Neurological:      Mental Status: He is alert.   Psychiatric:         Mood and Affect: Mood normal.         Behavior: Behavior normal.         Judgment: Judgment normal.         WORKUP: IgE laboratory evaluation for environmental allergies as patient has taken antihistamine within the last 3 or 4 days, patient does not remember exact date.     ASSESSMENT/PLAN:  Jackie Medeiros is a 29 year old male with rhinoconjunctivitis, concerns for environmental allergies, and concerns for minocycline allergy/delayed reaction.    Rhinoconjunctivitis  We will be in touch via [employer] with  his laboratory evaluation for environmental allergies.  Azelastine (Astepro ) nasal spray, 1-2 sprays in each nostril twice daily as needed.  This medication may be better taste, please consider brushing your teeth after using this nasal spray.     If you feel like azelastine nasal spray is not adequately controlling your nasal symptoms, start intranasal fluticasone (Flonase) 1-2 sprays in each nostril once daily.     fexofenadine (Allegra) 180 mg, drowsiness with loratadine.  We discussed possibly discontinuing antihistamines if he does not have positive environmental allergy testing.    Pataday (olapatadine) 1 drop/eye daily as needed.      Patient was given instructions for nasal saline irrigation, 1-2 times daily as needed.    If nasal saline irrigation is not tolerated, nasal saline sprays could be beneficial to remove allergens from the mucous membrane.    Patient was given instructions for environmental modifications techniques.    Minocycline allergy/delayed reaction concern  Upon chart review it was found that this potential delayed reaction occurred September 2017.  Patient reports that this swelling of his lower extremities/pruritus did not begin within the first 1-2 doses of being exposed to this medication.  Patient denies rash today beyond swelling of his lower extremities.    Freddy Kent DO   Osteopath  Specialty: Family Practice     Progress Notes  Signed     Encounter Date: 9/22/2017     Expand All Collapse All    SUBJECTIVE:Jackie Medeiros is a 22 year old male who presents to the clinic today for a rash.  Onset of rash was 1 day(s) ago.   Rash is still present.   Location of the rash: generalized.  Associated symptoms include: itching.    Symptoms are moderate and rash seems to be worsening.  Therapies tried to improve the rash: none.  Previous history of a similar rash? No  Recent exposure history: minocycle for acne started 1 week ago     ROS:CONSTITUTIONAL:NEGATIVE for fever, chills,  change in weight  ENT/MOUTH: NEGATIVE for ear, mouth and throat problems  RESP:NEGATIVE for significant cough or SOB     EXAM: VITALS: /78  Pulse 89  Temp 99.4  F (37.4  C) (Oral)  Wt 194 lb 14.4 oz (88.4 kg)  SpO2 96%  BMI 27.97 kg/m2  General:healthy,alert,no distress    Location: generalized     Distribution: diffuse/patchy     Lesion grouping: bilareerat       Lesion type: macular and wheals     Color: skin color with no other findingsPERTINENT EXAM: GENERAL        Patient would like to have further evaluation for this listed drug allergy.  Referral to Dr. Hernandes.    Follow-up in 1 year, or sooner if needed    Thank you for allowing me to participate in the care of Jackie Medeiros.    I spent 45 minutes on the date of the encounter doing chart review, history and exam, documentation and further coordination as noted above exclusive of separately reported interpretations.     Please note that this note consists of symbols derived from keyboarding, dictation and/or voice recognition software. As a result, there may be errors in the script that have gone undetected. Please consider this when interpreting information found in this chart.     Anika Randall PA-C  [facility]      Again, thank you for allowing me to participate in the care of your patient.        Sincerely,        Anika Randall PA-C

## 2024-12-04 NOTE — PROGRESS NOTES
Jackie Medeiros was seen in the Allergy Clinic at [facility]    Jackie Medeiros is a 29 year old male  being seen today for ongoing evaluation of concern for allergies.  Patient is a self-referral.    History of Present Illness:       Rhinoconjunctivitis    The onset of symptoms: Oct 2024, patient reports during the month of October he had significant allergy symptoms.  He tried using loratadine, with only slight improvement of his symptoms.  Patient [place].  Perennial/seasonally-exacerbated (Fall) chronic nasal symptoms (itch, clear rhinorrhea, stuffiness, and sneezing), postnasal drip and ocular symptoms (itching).  Triggers identified include grass and mowing.Oral antihistamines loratadine have been used.  He reports he gets slightly tired from loratadine.  Denies use of nasal sprays.  The patient symptoms are currently 6% controlled. Comes back within a few hours of medications   There is no history of PE tubes, sinus surgeries, or tonsillectomy/adenoidectomy.     Medication allergy concern  Patient reports in the year of 2017 or 2018 he started taking minocycline for acne, he remembers about a week into his treatment plan he developed leg swelling, and had itchiness throughout his body.  Denies that he had any hives/mosquito bite-like lesions throughout the rest of his body.   [facility]      Question 12/4/2024 10:07 AM CST - Filed by Patient   Reason for visit: Medication allergies    Bee sting/ Insect allergy   Bee/ Insect Allergy    What symptoms have you experienced? Runny nose    Cough    Nausea    Vomiting   How soon did symptoms begin? More than an hour later   Environmental and Social History    [facility] of Residence: [facility]   Do you have Central Air Conditioning? Yes   Type of Heating System: Radiator   Wood burning stove or fireplace: No   Occupation: student   Pets: No   Do you smoke cigarettes: No   Do you use  an e-cigarette or vape? No   Does anyone living in your home smoke or vape? No   Family History    Do your parents, siblings, or children have asthma? No   Do your parents, siblings, or children have environmental allergies? No   Do your parents, siblings, or children have food allergies? No   Do your parents, siblings, or children have eczema?          Past Medical History:   Diagnosis Date    Abnormal laboratory test 1/1/2012    Allergic state     Anemia 8/6/2013    Environmental allergies 1/6/2013    Vitamin D deficiency 1/24/14    vitamin D level 23       Past Surgical History:   Procedure Laterality Date    NO PAST SURGERIES           Current Outpatient Medications:     acetaminophen (TYLENOL) 500 MG tablet, Take 1-2 tablets (500-1,000 mg) by mouth every 6 hours as needed for mild pain., Disp: 30 tablet, Rfl: 0    loratadine (CLARITIN) 10 MG tablet, Take 1 tablet (10 mg) by mouth daily., Disp: 90 tablet, Rfl: 1    meloxicam (MOBIC) 7.5 MG tablet, Take 7.5 mg by mouth daily., Disp: , Rfl:     QUEtiapine (SEROQUEL) 25 MG tablet, Take 1 tablet (25 mg) by mouth at bedtime., Disp: 90 tablet, Rfl: 1    tiZANidine (ZANAFLEX) 4 MG tablet, TAKE 1 TABLET(4 MG) BY MOUTH EVERY NIGHT AS NEEDED FOR MUSCLE SPASMS, Disp: 30 tablet, Rfl: 0    vitamin D2 (ERGOCALCIFEROL) 84733 units (1250 mcg) capsule, Take 1 capsule (50,000 Units) by mouth once a week. For 8 weeks., Disp: 8 capsule, Rfl: 0    vitamin D3 (CHOLECALCIFEROL) 50 mcg (2000 units) tablet, Take 1 tablet (50 mcg) by mouth daily., Disp: 90 tablet, Rfl: 1  Allergies   Allergen Reactions    Dust Mites     Minocycline Hives         Family History   Problem Relation Age of Onset    Stomach Problem Mother     Kidney Disease Father     Diabetes Type 1 Sister     Hypothyroidism Sister     Seizure Disorder Sister     No Known Problems Sister     No Known Problems Sister     No Known Problems Sister     No Known Problems Brother     Family History Negative No family hx of       EXAM:   There were no vitals taken for this visit.    Physical Exam  Constitutional:       General: He is not in acute distress.     Appearance: Normal appearance. He is not ill-appearing.   HENT:      Nose: Congestion and rhinorrhea present. No nasal deformity or septal deviation.      Right Turbinates: Not enlarged, swollen or pale.      Left Turbinates: Not enlarged, swollen or pale.      Comments: Nasal turbinate enlargement, 80% obstruction.     Mouth/Throat:      Mouth: Mucous membranes are moist.      Pharynx: Oropharynx is clear. Uvula midline. No posterior oropharyngeal erythema.      Tonsils: 0 on the right. 0 on the left.   Eyes:      General: No allergic shiner.        Right eye: No discharge.         Left eye: No discharge.      Conjunctiva/sclera: Conjunctivae normal.   Cardiovascular:      Rate and Rhythm: Normal rate and regular rhythm.      Heart sounds: Normal heart sounds, S1 normal and S2 normal.   Pulmonary:      Effort: Pulmonary effort is normal. No prolonged expiration.      Breath sounds: Normal breath sounds. No decreased air movement. No wheezing.   Neurological:      Mental Status: He is alert.   Psychiatric:         Mood and Affect: Mood normal.         Behavior: Behavior normal.         Judgment: Judgment normal.         WORKUP: IgE laboratory evaluation for environmental allergies as patient has taken antihistamine within the last 3 or 4 days, patient does not remember exact date.     ASSESSMENT/PLAN:  Jackie Medeiros is a 29 year old male with rhinoconjunctivitis, concerns for environmental allergies, and concerns for minocycline allergy/delayed reaction.    Rhinoconjunctivitis  We will be in touch via [employer] with his laboratory evaluation for environmental allergies.  Azelastine (Astepro ) nasal spray, 1-2 sprays in each nostril twice daily as needed.  This medication may be better taste, please consider brushing your teeth after using this nasal spray.     If you feel like  azelastine nasal spray is not adequately controlling your nasal symptoms, start intranasal fluticasone (Flonase) 1-2 sprays in each nostril once daily.     fexofenadine (Allegra) 180 mg, drowsiness with loratadine.  We discussed possibly discontinuing antihistamines if he does not have positive environmental allergy testing.    Pataday (olapatadine) 1 drop/eye daily as needed.      Patient was given instructions for nasal saline irrigation, 1-2 times daily as needed.    If nasal saline irrigation is not tolerated, nasal saline sprays could be beneficial to remove allergens from the mucous membrane.    Patient was given instructions for environmental modifications techniques.    Minocycline allergy/delayed reaction concern  Upon chart review it was found that this potential delayed reaction occurred September 2017.  Patient reports that this swelling of his lower extremities/pruritus did not begin within the first 1-2 doses of being exposed to this medication.  Patient denies rash today beyond swelling of his lower extremities.    Freddy Kent DO   Osteopath  Specialty: Family Practice     Progress Notes  Signed     Encounter Date: 9/22/2017     Expand All Collapse All    SUBJECTIVE:Jackie Medeiros is a 22 year old male who presents to the clinic today for a rash.  Onset of rash was 1 day(s) ago.   Rash is still present.   Location of the rash: generalized.  Associated symptoms include: itching.    Symptoms are moderate and rash seems to be worsening.  Therapies tried to improve the rash: none.  Previous history of a similar rash? No  Recent exposure history: minocycle for acne started 1 week ago     ROS:CONSTITUTIONAL:NEGATIVE for fever, chills, change in weight  ENT/MOUTH: NEGATIVE for ear, mouth and throat problems  RESP:NEGATIVE for significant cough or SOB     EXAM: VITALS: /78  Pulse 89  Temp 99.4  F (37.4  C) (Oral)  Wt 194 lb 14.4 oz (88.4 kg)  SpO2 96%  BMI 27.97  kg/m2  General:healthy,alert,no distress    Location: generalized     Distribution: diffuse/patchy     Lesion grouping: bilareerat       Lesion type: macular and wheals     Color: skin color with no other findingsPERTINENT EXAM: GENERAL        Patient would like to have further evaluation for this listed drug allergy.  Referral to Dr. Hernandes.    Follow-up in 1 year, or sooner if needed    Thank you for allowing me to participate in the care of Jackie Medeiros.    I spent 45 minutes on the date of the encounter doing chart review, history and exam, documentation and further coordination as noted above exclusive of separately reported interpretations.     Please note that this note consists of symbols derived from keyboarding, dictation and/or voice recognition software. As a result, there may be errors in the script that have gone undetected. Please consider this when interpreting information found in this chart.     Anika Randall PA-C  [facility]

## 2024-12-04 NOTE — PATIENT INSTRUCTIONS
Azelastine (Astepro ) nasal spray, 1-2 sprays in each nostril twice daily as needed.  This medication may be bitter taste, please consider brushing your teeth after using this nasal spray.     If you feel like azelastine nasal spray is not adequately controlling your nasal symptoms, start intranasal fluticasone (Flonase) 1-2 sprays in each nostril once daily.     fexofenadine (Allegra) 180 mg    Pataday (olapatadine) 1 drop/eye daily as needed.  Keep this medication in the refrigerator for comfort of your eyes.  If you wear contacts, please wait at least 10 minutes before placing the contacts into your eye.     NASAL SALINE IRRIGATION INSTRUCTIONS     You will be starting nasal saline irrigations and will need to obtain the following:       - NeilMed Sinus Rinse 8 oz Kit (or prefer device)    - Distilled or filtered water   - Normal saline salt packets     Place filtered or distilled water into the NeilMed bottle up to the fill line (DO NOT USE TAP OR WELL WATER).  Place the pre-made salt packet in the 8 oz of saline.  Shake the bottle to suspend into solution.  Lean head forward over a sink or a basin.  Rinse each side of the nose with one-half of the bottle (each squeeze is about one-half of the bottle). Rinse the nose daily.      If you use topical nasal sprays, apply following irrigation.     Video example: https://www.youPhaseRx.com/watch?v=[email or web address]      SINUS SALINE RINSE RECIPE    This can be completed 1-2 times daily, or as needed    Ingredients  1.  Pickling or juan salt-containing no iodide, anti-caking agents or preservatives. Do not use other salt such as table salt as these can be irritating to the nasal lining  2.  Baking soda  3.  8 ounces (1 cup) of lukewarm distilled or boiled water    In a clean container, mix 3 teaspoons of iodide-free salt with 1 teaspoon of baking soda and store in a small airtight container. Add 1 teaspoon of the mixture to 8 ounces (1 cup) of lukewarm distilled or boiled  water. Use less dry ingredients to make a weaker solution if burning or stinging is experienced.    Notes - if you are boiling tap water, cool water prior to use to prevent injury.   - do not use tap water unless it has been sterilized by boiling water prior to use.       If nasal saline irrigation is not tolerated, nasal saline sprays could be beneficial to remove allergens from the mucous membrane.    AEROALLERGEN AVOIDANCE INSTRUCTIONS  MOLD  Indoors, mold season is year round. Outdoors, most mold prefer seasons with high humidity. Mold prefers damp, dark, warm places. Here are some tips on how to avoid mold exposure.   Keep humidity inside between 35-50% with air conditioning or dehumidifier. The humidity level can be checked with a meter from a hardware store.    Clean surfaces where mold grows and dry wet areas.   Avoid steam cleaning carpets and discard moldy belongings.   Wear a mask when doing yard work and refrain from walking through uncut fields or playing in leaves.   Minimize use of potted plants and do not keep them indoors.   Consider an allergy cover for the pillow and mattress.  POLLEN  Pollens are the tiny airborne particles given off by trees, weeds, and grasses. They can be the cause of seasonal allergic rhinitis or hay fever symptoms, which include stuffy, itchy, runny nose, redness, swelling and itching of the eyes, and itching of the ears and throat. Here are some tips on how to avoid pollen exposure.  Keep windows closed and use the air conditioner when possible.   Avoid outside exposure in the early morning as pollen counts are highest at that time.   Take a shower and wash hair each night.   Consider wearing a mask when working in the yard and/or garden.   Clean furnace filter monthly with HEPA filters. Consider a HEPA filter vacuum  which will prevent pollen from being reintroduced into the air.   DUST MITES  Dust mites can never be entirely eliminated in the house no matter how  clean your house is. Dust mites are attracted to warm, moist areas and feed on dead skin flakes. Here are tips to minimize dust mites in your home.   Encase pillows and mattress/box springs in zippered allergy covers.   Wash bedding in hot water (at least 130 F) every 7-14 days.   Avoid curtains, carpet, and upholstered furniture if possible.   Use HEPA air filters and a HEPA filter vacuum . Change filters monthly. Vacuum weekly.   Keep bedroom simple, avoiding clutter, so it can quickly be dusted.   Cover heating vents with vent filters.   Keep stuffed toys in a closed container and wash or freeze regularly.   Keep clothing in the closet with the door closed.   Keep your home with humidity between 40 and 50%.   PETS  Pets present many problems for people with allergies. Dander from pets is very difficult to remove and also is a food source for dust mites.   If possible, find the pet a new home.   If not possible, keep the pet outdoors. Never allow the pet into the bedroom.   Wash pet weekly in warm water.   Encase mattresses, pillows, and box springs in allergen-proof covers.   Use HEPA air filters and a HEPA filter vacuum . Change filters monthly.     Common seasonal allergens:  Tree pollens ? Early to late spring  Grass pollens ? Late spring to early summer  Weed pollens ? Early summer through fall  Molds ? Present year round in many climates, but significant primarily when it is warm, humid and wet  Common perennial (year?round) allergens:  Dust mites ? a very common indoor allergen that causes significant allergy in most of the [place]; highest allergen levels are in the bed  Pet dander ? cat and dog are the most common, but anything with fur or hair can cause allergies. Immunotherapy has only been shown to work well for cat and dog.

## 2024-12-05 PROBLEM — H10.9 RHINOCONJUNCTIVITIS: Status: ACTIVE | Noted: 2024-12-05

## 2024-12-05 PROBLEM — J31.0 RHINOCONJUNCTIVITIS: Status: ACTIVE | Noted: 2024-12-05

## 2024-12-08 LAB
A ALTERNATA IGE QN: <0.1 KU(A)/L
A FUMIGATUS IGE QN: <0.1 KU(A)/L
C HERBARUM IGE QN: <0.1 KU(A)/L
CALIF WALNUT POLN IGE QN: <0.1 KU(A)/L
CAT DANDER IGG QN: <0.1 KU(A)/L
CEDAR IGE QN: <0.1 KU(A)/L
COMMON RAGWEED IGE QN: <0.1 KU(A)/L
COTTONWOOD IGE QN: <0.1 KU(A)/L
D FARINAE IGE QN: <0.1 KU(A)/L
D PTERONYSS IGE QN: 0.18 KU(A)/L
DOG DANDER+EPITH IGE QN: <0.1 KU(A)/L
E PURPURASCENS IGE QN: <0.1 KU(A)/L
EAST WHITE PINE IGE QN: <0.1 KU(A)/L
ENGL PLANTAIN IGE QN: <0.1 KU(A)/L
FIREBUSH IGE QN: <0.1 KU(A)/L
GIANT RAGWEED IGE QN: <0.1 KU(A)/L
GOOSEFOOT IGE QN: <0.1 KU(A)/L
IGE SERPL-ACNC: 59 KU/L (ref 0–114)
JOHNSON GRASS IGE QN: <0.1 KU(A)/L
MAPLE IGE QN: <0.1 KU(A)/L
MUGWORT IGE QN: <0.1 KU(A)/L
NETTLE IGE QN: <0.1 KU(A)/L
P NOTATUM IGE QN: <0.1 KU(A)/L
RED MULBERRY IGE QN: <0.1 KU(A)/L
SALTWORT IGE QN: <0.1 KU(A)/L
SHEEP SORREL IGE QN: <0.1 KU(A)/L
SILVER BIRCH IGE QN: <0.1 KU(A)/L
TIMOTHY IGE QN: <0.1 KU(A)/L
WHITE ASH IGE QN: <0.1 KU(A)/L
WHITE ELM IGE QN: <0.1 KU(A)/L
WHITE MULBERRY IGE QN: <0.1 KU(A)/L
WHITE OAK IGE QN: <0.1 KU(A)/L
WORMWOOD IGE QN: <0.1 KU(A)/L

## 2024-12-11 DIAGNOSIS — M54.50 BILATERAL LOW BACK PAIN WITHOUT SCIATICA, UNSPECIFIED CHRONICITY: ICD-10-CM

## 2024-12-18 DIAGNOSIS — E55.9 VITAMIN D DEFICIENCY: ICD-10-CM

## 2024-12-18 RX ORDER — ERGOCALCIFEROL 1.25 MG/1
CAPSULE, LIQUID FILLED ORAL
Qty: 8 CAPSULE | Refills: 0 | OUTPATIENT
Start: 2024-12-18

## 2025-01-07 ENCOUNTER — OFFICE VISIT (OUTPATIENT)
Dept: URGENT CARE | Facility: URGENT CARE | Age: 30
End: 2025-01-07
Payer: COMMERCIAL

## 2025-01-07 VITALS
WEIGHT: 203 LBS | BODY MASS INDEX: 28.72 KG/M2 | HEART RATE: 69 BPM | TEMPERATURE: 97.6 F | OXYGEN SATURATION: 97 % | SYSTOLIC BLOOD PRESSURE: 115 MMHG | DIASTOLIC BLOOD PRESSURE: 78 MMHG

## 2025-01-07 DIAGNOSIS — R05.2 SUBACUTE COUGH: ICD-10-CM

## 2025-01-07 DIAGNOSIS — H61.21 IMPACTED CERUMEN OF RIGHT EAR: ICD-10-CM

## 2025-01-07 DIAGNOSIS — R09.82 PND (POST-NASAL DRIP): Primary | ICD-10-CM

## 2025-01-07 PROCEDURE — 69209 REMOVE IMPACTED EAR WAX UNI: CPT | Mod: RT | Performed by: STUDENT IN AN ORGANIZED HEALTH CARE EDUCATION/TRAINING PROGRAM

## 2025-01-07 PROCEDURE — 99213 OFFICE O/P EST LOW 20 MIN: CPT | Mod: 25 | Performed by: STUDENT IN AN ORGANIZED HEALTH CARE EDUCATION/TRAINING PROGRAM

## 2025-01-07 RX ORDER — BENZONATATE 100 MG/1
100 CAPSULE ORAL 3 TIMES DAILY PRN
Qty: 20 CAPSULE | Refills: 0 | Status: SHIPPED | OUTPATIENT
Start: 2025-01-07

## 2025-01-07 RX ORDER — CETIRIZINE HYDROCHLORIDE 10 MG/1
10 TABLET ORAL DAILY
Qty: 30 TABLET | Refills: 0 | Status: SHIPPED | OUTPATIENT
Start: 2025-01-07 | End: 2025-02-06

## 2025-01-07 RX ORDER — FLUTICASONE PROPIONATE 50 MCG
1 SPRAY, SUSPENSION (ML) NASAL DAILY
Qty: 9.9 ML | Refills: 0 | Status: SHIPPED | OUTPATIENT
Start: 2025-01-07

## 2025-01-08 ENCOUNTER — OFFICE VISIT (OUTPATIENT)
Dept: AUDIOLOGY | Facility: CLINIC | Age: 30
End: 2025-01-08
Payer: COMMERCIAL

## 2025-01-08 DIAGNOSIS — H93.299 ABNORMAL AUDITORY PERCEPTION: Primary | ICD-10-CM

## 2025-01-08 NOTE — PROGRESS NOTES
chief complaint; Cough    HPI:  Jackie Medeiros is a 30 year old male who presents today complaining of cough. Initially had cough and fever about 3 weeks ago, it stopped but then cough continued. Has itchiness on the throat. Has azelastine spray as well.  Takes loratadine prn but no changes so far. Feels frustrated from coughing multiple times a day. No shortness of breath or chest pain.  No other related symptoms.    History obtained from the patient.    Problem List:  2024-12: Rhinoconjunctivitis  2024-11: Elevated glucose  2024-04: Bilateral low back pain without sciatica, unspecified   chronicity  2017-08: Insomnia  2017-07: Cyclothymic disorder  2014-01: Vitamin D deficiency  2013-08: Anemia  2013-02: Hyperlipidemia LDL goal <130  2013-01: Environmental allergies  2011-01: H. pylori infection  2010-06: Anxiety  2010-06: GERD (gastroesophageal reflux disease)      Past Medical History:   Diagnosis Date    Abnormal laboratory test 1/1/2012    Allergic state     Anemia 8/6/2013    Environmental allergies 1/6/2013    Vitamin D deficiency 1/24/14    vitamin D level 23       Social History     Tobacco Use    Smoking status: Never    Smokeless tobacco: Never   Substance Use Topics    Alcohol use: No       Review of systems  ROS negative except for pertinent positives listed in HPI      Vitals:    01/07/25 1839   BP: 115/78   Pulse: 69   Temp: 97.6  F (36.4  C)   TempSrc: Tympanic   SpO2: 97%   Weight: 92.1 kg (203 lb)       Physical Exam  Constitutional: healthy, alert, and no distress  Head: Normocephalic.   ENT: Rear with impacted cerumen. ENT exam normal, no neck nodes or sinus tenderness  Cardiovascular:  RRR. No murmurs, clicks gallops or rub  Respiratory:unlabored respiratory effort  Musculoskeletal: extremities normal- no gross deformities noted, gait normal  Psychiatric: mentation appears normal and affect normal/bright      Assessment & Plan     Jackie was seen today for cold symptoms.    Diagnoses and  all orders for this visit:    PND (post-nasal drip)  Suspect cough is likely from PND and possibly post viral cough syndrome could be playing a role as well  -     fluticasone (FLONASE) 50 MCG/ACT nasal spray; Spray 1 spray into both nostrils daily.  -     cetirizine (ZYRTEC) 10 MG tablet; Take 1 tablet (10 mg) by mouth daily.    Subacute cough        - Tessalon pearls TID prn     Impacted cerumen of right ear  Ear irrigation done by MARITA with warm water and syringe  -     TX REMOVAL IMPACTED CERUMEN IRRIGATION/LVG UNILAT    At the end of the encounter, I discussed diagnosis, medications. Discussed red flags for immediate return to clinic/ER, as well as indications for follow up if no improvement. Patient understood and agreed to plan. Patient was stable for discharge.

## 2025-01-08 NOTE — PROGRESS NOTES
AUDIOLOGY REPORT    SUBJECTIVE:  Jackie Medeiros is a 30 year old male who was seen in the Audiology Clinic at the [facility] for audiologic evaluation, with concerns that her hearing has been gradually declining.  The patient denies  bilateral tinnitus, bilateral otalgia, bilateral drainage, bilateral aural fullness, family history of hearing loss, and history of noise exposure.  The patient notes difficulty with communication in a variety of listening situations.  They were unaccompanied today.  .    OBJECTIVE:  Otoscopic exam indicates ears are clear of cerumen bilaterally     Pure Tone Thresholds assessed using conventional audiometry with good  reliability from 250-8000 Hz bilaterally using insert earphones and circumaural headphones     RIGHT:  normal     LEFT:    normal     Tympanogram:    RIGHT: normal eardrum mobility    LEFT:   normal eardrum mobility    Reflexes (reported by stimulus ear):  RIGHT: Ipsilateral is present at normal levels  RIGHT: Contralateral is present at normal levels  LEFT:   Ipsilateral is present at normal levels  LEFT:   Contralateral is present at normal levels      Speech Reception Threshold:    RIGHT: 10 dB HL    LEFT:   10 dB HL  Word Recognition Score:     RIGHT: 100% at 50 dB HL using NU-6 recorded word list.    LEFT:   100% at 50 dB HL using NU-6 recorded word list.      ASSESSMENT:   Abnormal auditory perception     Today s results were discussed with the patient in detail.     PLAN:   It is recommended that the patient use hearing protection in noise and return to clinic for repeat hearing testing as needed.  Please call this clinic with questions regarding these results or recommendations. All questions were fully answered.           Eros Morejon.   Doctor of Audiology  License #[id number]

## 2025-01-27 ENCOUNTER — MYC REFILL (OUTPATIENT)
Dept: FAMILY MEDICINE | Facility: CLINIC | Age: 30
End: 2025-01-27
Payer: COMMERCIAL

## 2025-01-27 DIAGNOSIS — E55.9 VITAMIN D DEFICIENCY: ICD-10-CM

## 2025-01-27 RX ORDER — CHOLECALCIFEROL (VITAMIN D3) 50 MCG
1 TABLET ORAL DAILY
Qty: 90 TABLET | Refills: 1 | Status: SHIPPED | OUTPATIENT
Start: 2025-01-27

## 2025-01-27 RX ORDER — ERGOCALCIFEROL 1.25 MG/1
50000 CAPSULE, LIQUID FILLED ORAL WEEKLY
Qty: 8 CAPSULE | Refills: 0 | OUTPATIENT
Start: 2025-01-27

## 2025-01-28 ENCOUNTER — OFFICE VISIT (OUTPATIENT)
Dept: FAMILY MEDICINE | Facility: CLINIC | Age: 30
End: 2025-01-28
Payer: COMMERCIAL

## 2025-01-28 VITALS
HEART RATE: 74 BPM | DIASTOLIC BLOOD PRESSURE: 76 MMHG | WEIGHT: 209.6 LBS | TEMPERATURE: 98 F | SYSTOLIC BLOOD PRESSURE: 113 MMHG | BODY MASS INDEX: 29.34 KG/M2 | OXYGEN SATURATION: 98 % | HEIGHT: 71 IN | RESPIRATION RATE: 16 BRPM

## 2025-01-28 DIAGNOSIS — F34.0 CYCLOTHYMIC DISORDER: ICD-10-CM

## 2025-01-28 DIAGNOSIS — L98.9 SKIN LESION: ICD-10-CM

## 2025-01-28 DIAGNOSIS — H57.89 BURNING SENSATION OF EYE: ICD-10-CM

## 2025-01-28 DIAGNOSIS — N62 PSEUDOGYNECOMASTIA: ICD-10-CM

## 2025-01-28 DIAGNOSIS — G47.09 OTHER INSOMNIA: Primary | ICD-10-CM

## 2025-01-28 PROCEDURE — G2211 COMPLEX E/M VISIT ADD ON: HCPCS | Performed by: NURSE PRACTITIONER

## 2025-01-28 PROCEDURE — 99214 OFFICE O/P EST MOD 30 MIN: CPT | Performed by: NURSE PRACTITIONER

## 2025-01-28 RX ORDER — QUETIAPINE FUMARATE 50 MG/1
50 TABLET, FILM COATED ORAL 2 TIMES DAILY
Qty: 30 TABLET | Refills: 1 | Status: SHIPPED | OUTPATIENT
Start: 2025-01-28

## 2025-01-28 ASSESSMENT — PAIN SCALES - GENERAL: PAINLEVEL_OUTOF10: SEVERE PAIN (7)

## 2025-01-28 NOTE — PROGRESS NOTES
Assessment & Plan   (G47.09) Other insomnia  (primary encounter diagnosis)  Comment: Persistent symptom.  Will increase quetiapine to 50 mg once at bedtime from 25 mg dose.    Plan: QUEtiapine (SEROQUEL) 50 MG tablet    (F34.0) Cyclothymic disorder  Comment: See above.   Plan: QUEtiapine (SEROQUEL) 50 MG tablet    (H57.89) Burning sensation of eye  Comment: New issue.  Rewetting drops ordered or buy them OTC.  Likely dryness.  No infection noted.   Plan: Adult Eye  Referral, polyethylene         glycol-propylene glycol PF (SYSTANE ULTRA PF)         0.4-0.3 % SOLN opthalmic solution    (L98.9) Skin lesion  Comment: New finding.    Plan: Adult Eye  Referral    (N62) Pseudogynecomastia  Comment: Recheck in 1 month.  Recommend that he perform self breast exam monthly.  If any changes or lumps, then needs to be seen in clinic for eval.        Patient Instructions   Take your meds as prescribed.    Scripts / refills sent to your pharmacy.   Increase Seroquel to 50 mg once at bedtime.  Use script at home and then get new script.   No caffeine at 1pm to help with sleep.     Monitor breast tissue.       Increase water (64 ounces per day), fruits, and vegetables.    Exercise at least 5 days per week for 30 minutes each day.     See me 4-6 weeks for med check and breast check.     Follow-up if any changes or worsening symptoms.      Call or MyChart as needed as well.         The longitudinal plan of care for the diagnosis(es)/condition(s) as documented were addressed during this visit. Due to the added complexity in care, I will continue to support Jackie in the subsequent management and with ongoing continuity of care.        Jayleen Mejia is a 30 year old, presenting for the following health issues:  Insomnia (Follow Up)        1/28/2025     8:49 AM   Additional Questions   Roomed by Flor Lucio, EMT-B     History of Present Illness       Reason for visit:  Check my vitamin level, sleep  check..etc    He eats 0-1 servings of fruits and vegetables daily.He consumes 4 sweetened beverage(s) daily.He exercises with enough effort to increase his heart rate 30 to 60 minutes per day.  He exercises with enough effort to increase his heart rate 3 or less days per week. He is missing 3 dose(s) of medications per week.  He is not taking prescribed medications regularly due to cost of medication and other.     Insomnia  Onset/Duration: On/Off for 8 yrs, mainly seasonal, since Oct 2024  Description:   Frequency of insomnia:  nightly  Time to fall asleep (sleep latency): 2 hours  Middle of night awakening:  YES  Early morning awakening:  YES  Progression of Symptoms:  improving  Accompanying Signs & Symptoms:  Daytime sleepiness/napping: YES  Excessive snoring/apnea: No  Restless legs: YES  Waking to urinate: YES  Chronic pain:  No  Depression symptoms (if yes, do PHQ9): No  Anxiety symptoms (if yes, do TIMOTEO-7): No  History:  Prior Insomnia: YES  New stressful situation: No  Precipitating factors:   Caffeine intake: YES- tea; drinks about 3-4 cups of tea per day.  Two cups at 6 am and 2 cups at 8pm.    OTC decongestants: No  Any new medications: No  Alleviating factors:  Self medicating (alcohol, etc.):  No  Stress-reduction (exercise, yoga, meditation etc): YES- exercise 4x week  Therapies tried and outcome: alcohol and Seroquel -  effective  Seroquel 25 mg HS which gives him some relief.  Will take Tizanidine 4 mg for muscle spasm and this is helpful.     Burning eye:  Right eye has been burning for the past 4 days.  No drainage from eye.  Slight blurry vision noted intermittently.  No blurry vision today.  Right eye with skin lesion of upper lid for the past week.  Feels more sore.      Breast tissue:  Bilateral.  Left seems a bit bigger than the right. Started at age 9.  No pain or lumps noted.  No meds tried.  No marijuana use.         Review of Systems  Constitutional, neuro, ENT, endocrine, pulmonary,  "cardiac, gastrointestinal, genitourinary, musculoskeletal, integument and psychiatric systems are negative, except as otherwise noted.        Objective    /76 (BP Location: Right arm, Patient Position: Sitting, Cuff Size: Adult Large)   Pulse 74   Temp 98  F (36.7  C) (Oral)   Resp 16   Ht 1.791 m (5' 10.5\")   Wt 95.1 kg (209 lb 9.6 oz)   SpO2 98%   BMI 29.65 kg/m    Body mass index is 29.65 kg/m .  Physical Exam  Constitutional:       General: He is not in acute distress.     Appearance: Normal appearance. He is not ill-appearing.   HENT:      Head: Normocephalic.      Right Ear: Tympanic membrane, ear canal and external ear normal. There is no impacted cerumen.      Left Ear: Tympanic membrane, ear canal and external ear normal. There is no impacted cerumen.      Nose: Nose normal. No congestion or rhinorrhea.      Mouth/Throat:      Mouth: Mucous membranes are moist.      Pharynx: Oropharynx is clear. No oropharyngeal exudate or posterior oropharyngeal erythema.   Eyes:      General: No scleral icterus.        Right eye: No discharge.         Left eye: No discharge.      Conjunctiva/sclera: Conjunctivae normal.      Comments: Right upper eye lid with skin lesion / cyst.  Mild redness and tender upon palpation.   No redness of conjunctiva.  No discharge.     Cardiovascular:      Rate and Rhythm: Normal rate and regular rhythm.      Heart sounds: Normal heart sounds. No murmur heard.     No friction rub. No gallop.   Pulmonary:      Effort: Pulmonary effort is normal. No respiratory distress.      Breath sounds: Normal breath sounds. No wheezing, rhonchi or rales.   Genitourinary:     Comments: Breasts:  Has some breast tissue or extra fat of both breasts; Left side greater than right side.  Areolas are symmetric.  No density of areola.  No discharge of nipples.  No axillary lymphadenopathy.           Musculoskeletal:      Cervical back: Normal range of motion and neck supple. No rigidity. "   Lymphadenopathy:      Cervical: No cervical adenopathy.   Skin:     General: Skin is warm and dry.      Findings: No rash.   Neurological:      General: No focal deficit present.      Mental Status: He is alert.   Psychiatric:         Mood and Affect: Mood normal.         Behavior: Behavior normal.         Thought Content: Thought content normal.         Judgment: Judgment normal.                Signed Electronically by: SWETA Alvarez CNP

## 2025-01-28 NOTE — PATIENT INSTRUCTIONS
Take your meds as prescribed.    Scripts / refills sent to your pharmacy.   Increase Seroquel to 50 mg once at bedtime.  Use script at home and then get new script.   No caffeine at 1pm to help with sleep.     Monitor breast tissue.       Increase water (64 ounces per day), fruits, and vegetables.    Exercise at least 5 days per week for 30 minutes each day.     See me 4-6 weeks for med check and breast check.     Follow-up if any changes or worsening symptoms.      Call or MyChart as needed as well.

## 2025-02-05 ENCOUNTER — OFFICE VISIT (OUTPATIENT)
Dept: URGENT CARE | Facility: URGENT CARE | Age: 30
End: 2025-02-05
Payer: COMMERCIAL

## 2025-02-05 VITALS
WEIGHT: 206 LBS | DIASTOLIC BLOOD PRESSURE: 78 MMHG | SYSTOLIC BLOOD PRESSURE: 126 MMHG | HEART RATE: 73 BPM | TEMPERATURE: 98.1 F | BODY MASS INDEX: 29.14 KG/M2 | RESPIRATION RATE: 16 BRPM | OXYGEN SATURATION: 96 %

## 2025-02-05 DIAGNOSIS — H00.011 HORDEOLUM EXTERNUM OF RIGHT UPPER EYELID: Primary | ICD-10-CM

## 2025-02-05 PROCEDURE — 99213 OFFICE O/P EST LOW 20 MIN: CPT | Performed by: PHYSICIAN ASSISTANT

## 2025-02-05 RX ORDER — ERYTHROMYCIN 5 MG/G
0.5 OINTMENT OPHTHALMIC 4 TIMES DAILY
Qty: 10 G | Refills: 0 | Status: SHIPPED | OUTPATIENT
Start: 2025-02-05 | End: 2025-02-12

## 2025-02-05 NOTE — PROGRESS NOTES
Chief Complaint   Patient presents with    Urgent Care    Eye Burning Both Eyes     Pt complains of burning in both eyes, worse on the right eye and poss stye in bilateral upper eyelids x 2 weeks  Tried warm compress. Prescribed eye drops Systene from PCP gets relief for 20 minutes and sx return again       Assessment & Plan  Assessment  Chronic stye, also known as hordeolum, present on the right upper eyelid with a smaller, less red mass on the left upper eyelid.    Plan  - Prescribe antibacterial ointment to be applied on the eyelids at least four times a day to help mobilize blocked ducts and prevent infection.  - Recommend the use of warm compresses and eyelid massaging as part of home care.  - Provide a referral to an eye specialist for further evaluation and potential drainage of the stye.    Appointments  - Referral to an eye specialist for evaluation and potential drainage of stye. Contact number provided for scheduling.       ICD-10-CM    1. Hordeolum externum of right upper eyelid  H00.011 erythromycin (ROMYCIN) 5 MG/GM ophthalmic ointment     Adult Eye  Referral          SUBJECTIVE:  History of Present Illness-Jackie Medeiros, a 30-year-old male, reports experiencing burning eyes for about two weeks. He initially noticed the issue before a doctor's appointment on January 28, 2025, but hoped it would resolve on its own. The burning sensation is severe and affects his ability to work, as he cannot look at screens or be in certain environments without discomfort. He denies any significant vision changes, such as blurry or double vision, although he occasionally experiences burning while driving, necessitating brief eye closure. He has been using lubricating eye drops but has not received antibiotic drops. He also mentions insomnia as a potential contributing factor.     ROS: Pertinent ROS neg other than the symptoms noted above in the HPI.     OBJECTIVE:  /78 (BP Location: Left arm, Patient  Position: Sitting, Cuff Size: Adult Large)   Pulse 73   Temp 98.1  F (36.7  C) (Tympanic)   Resp 16   Wt 93.4 kg (206 lb)   SpO2 96%   BMI 29.14 kg/m     Physical Exam- HEENT: Right upper eyelid with an erythematous pustule and mass identified as a stye. Left upper eyelid with a smaller, less erythematous mass. Conjunctivae appear normal. Pupils are equal, round, and reactive to light (PERRL).       DIAGNOSTICS       No results found for any visits on 02/05/25.     Anastacio Ravi PA-C    Consent was obtained from the patient to use an AI documentation tool in the creation of this note.  Any grammatical or spelling errors are non-intentional. Please contact the author of this note directly if you are in need of any clarification.     Current Outpatient Medications   Medication Sig Dispense Refill    acetaminophen (TYLENOL) 500 MG tablet Take 1-2 tablets (500-1,000 mg) by mouth every 6 hours as needed for mild pain. 30 tablet 0    cetirizine (ZYRTEC) 10 MG tablet Take 1 tablet (10 mg) by mouth daily. 30 tablet 0    erythromycin (ROMYCIN) 5 MG/GM ophthalmic ointment Place 0.5 inches into both eyes 4 times daily for 7 days. 10 g 0    fluticasone (FLONASE) 50 MCG/ACT nasal spray Spray 1 spray into both nostrils daily. 9.9 mL 0    polyethylene glycol-propylene glycol PF (SYSTANE ULTRA PF) 0.4-0.3 % SOLN opthalmic solution Place 1 drop into both eyes every hour as needed for dry eyes. 30 each 0    QUEtiapine (SEROQUEL) 50 MG tablet Take 1 tablet (50 mg) by mouth 2 times daily. 30 tablet 1    vitamin D3 (CHOLECALCIFEROL) 50 mcg (2000 units) tablet Take 1 tablet (50 mcg) by mouth daily. 90 tablet 1    QUEtiapine (SEROQUEL) 25 MG tablet Take 1 tablet (25 mg) by mouth at bedtime. (Patient not taking: Reported on 2/5/2025) 90 tablet 1     No current facility-administered medications for this visit.      Patient Active Problem List   Diagnosis    GERD (gastroesophageal reflux disease)    Environmental allergies     Hyperlipidemia LDL goal <130    Vitamin D deficiency    Insomnia    Cyclothymic disorder    Elevated glucose    Rhinoconjunctivitis      Past Medical History:   Diagnosis Date    Abnormal laboratory test 1/1/2012    Allergic state     Anemia 8/6/2013    Environmental allergies 1/6/2013    Vitamin D deficiency 1/24/14    vitamin D level 23     Past Surgical History:   Procedure Laterality Date    NO PAST SURGERIES       Family History   Problem Relation Age of Onset    Stomach Problem Mother     Kidney Disease Father     Diabetes Type 1 Sister     Hypothyroidism Sister     Seizure Disorder Sister     No Known Problems Sister     No Known Problems Sister     No Known Problems Sister     No Known Problems Brother     Family History Negative No family hx of      Social History     Tobacco Use    Smoking status: Never    Smokeless tobacco: Never   Substance Use Topics    Alcohol use: No

## 2025-02-05 NOTE — LETTER
February 5, 2025      Jackienette Medeiros  [address] [place] [place] [address]        To Whom It May Concern:    Jackie Medeiros  was seen  in the clinic today. Please excuse them from work/school until symptoms improve.          Sincerely,        Anastacio Ravi PA-C    Electronically signed

## 2025-02-05 NOTE — PATIENT INSTRUCTIONS
Patient Instructions   Instructions for your chalazion / chalazia:  Most chalazia will resolve with treatment at home using warm compresses and massage to soften and drain them.  Follow these steps twice a day:     1.  Soak the eyelids for ten minutes with a hot wet cloth -- as hot as you can stand but not so hot that you burn yourself.  An easy way to make a long-lasting warm compress is to wrap a boiled egg or potato in a wet washcloth.  If you use the microwave to heat anything, be VERY CAREFUL that it is not too hot as microwaved foods and cloths can have very uneven hot spots that pose a burn hazard.       2.  After the eyelids are soft and refreshed from the hot compress, clean the debris from the glands at the bases of the eyelashes.  With a warm wet washcloth wrapped around your index finger, use the tip of your finger to vigorously scrub the bases of the eyelashes.  The principle is similar to brushing your teeth but here you can use a side-to-side motion.  Perform ten strokes per eyelid across the entire length of the eyelid. You can use plain water for this brushing or a foaming lid scrub such as Ocusoft. This cleaning dislodges and removes the caked-in secretions in the gland and debris on the eyelids.  Do NOT wash the EYEBALL.     3.  Apply erythromycin ointment to the eyelid at bedtime.     4. You may consider applying apple cider vinegar topically to the eyelid skin 2-3 times per day.  Dilute it a bit if it stings.  While there is no formal medical evidence that this works, some moms swear by it.  If you choose to try this, start with dilute vinegar and work up to a tolerable concentration.  STOP if there is persistent redness, pain, or light sensitivity more than a few minutes after use.     5.  Remember:  chalazia may take many WEEKS TO MONTHS to go away...so, hang in there and keep up with the compresses and scrubs!     6.  Finally, if the chalazia persist despite following all the measures above  consistently for at least 2 months, we can consider surgical removal.  This necessitates general anesthesia in children, which we would much prefer to avoid if possible; so, again, please be diligent and patient with the above regimen.  If Kelsi requires general anesthesia for any other surgery with another physician in the future, please contact our office to consider a combined chalazion incision & drainage at the same time.

## 2025-02-06 ENCOUNTER — TELEPHONE (OUTPATIENT)
Dept: OPHTHALMOLOGY | Facility: CLINIC | Age: 30
End: 2025-02-06
Payer: COMMERCIAL

## 2025-02-06 NOTE — TELEPHONE ENCOUNTER
LVM for patient regarding referral and scheduling as offered with  for [facility]-Anastacio Mireles PA-C. Provided direct number for scheduling options.    (2/21/25 at 8:30am for 30 minutes- remind patient to call and cancel if it goes away on it's own.)

## 2025-02-11 ENCOUNTER — TELEPHONE (OUTPATIENT)
Dept: OPHTHALMOLOGY | Facility: CLINIC | Age: 30
End: 2025-02-11

## 2025-02-11 ENCOUNTER — OFFICE VISIT (OUTPATIENT)
Dept: URGENT CARE | Facility: URGENT CARE | Age: 30
End: 2025-02-11
Payer: COMMERCIAL

## 2025-02-11 VITALS
OXYGEN SATURATION: 98 % | RESPIRATION RATE: 17 BRPM | DIASTOLIC BLOOD PRESSURE: 81 MMHG | SYSTOLIC BLOOD PRESSURE: 116 MMHG | WEIGHT: 200.8 LBS | HEART RATE: 68 BPM | TEMPERATURE: 97.1 F | BODY MASS INDEX: 28.4 KG/M2

## 2025-02-11 DIAGNOSIS — M54.2 NECK PAIN: ICD-10-CM

## 2025-02-11 DIAGNOSIS — H00.011 HORDEOLUM EXTERNUM OF RIGHT UPPER EYELID: Primary | ICD-10-CM

## 2025-02-11 PROCEDURE — 99214 OFFICE O/P EST MOD 30 MIN: CPT | Performed by: PHYSICIAN ASSISTANT

## 2025-02-11 RX ORDER — LIDOCAINE 4 G/G
1 PATCH TOPICAL EVERY 24 HOURS
Qty: 15 PATCH | Refills: 0 | Status: SHIPPED | OUTPATIENT
Start: 2025-02-11

## 2025-02-11 RX ORDER — GLIPIZIDE 10 MG/1
1 TABLET ORAL DAILY PRN
Qty: 15 ML | Refills: 0 | Status: SHIPPED | OUTPATIENT
Start: 2025-02-11

## 2025-02-11 RX ORDER — OLOPATADINE HYDROCHLORIDE 1 MG/ML
1 SOLUTION/ DROPS OPHTHALMIC 2 TIMES DAILY
Qty: 10 ML | Refills: 0 | Status: SHIPPED | OUTPATIENT
Start: 2025-02-11

## 2025-02-11 NOTE — TELEPHONE ENCOUNTER
Spoke with patient regarding referral and scheduling as offered with  for [facility]-Per Anastacio Ravi PA-C. Scheduled patient as offered for 2/21/25 at 8:30am for 30 minutes. Reminded patient to call and cancel if it goes away on it's own. Patient will see appointment in [facility].-Per Patient

## 2025-02-11 NOTE — PATIENT INSTRUCTIONS
Ice for the next 2-3 days.  Modify activity.  Mild pain is okay as long as it resolves after a minute or 2 of discontinuing the activity.  Decreased level activity if pain is moderate to severe or last longer than a few minutes after discontinuing.    Ibuprofen 400-600 mg (2-3 of the 200 mg OTC tablets or 400-600 mg of the children's liquid) up to 4 times daily with food or milk  Tylenol 500-1000 mg every 8 hours as needed

## 2025-02-11 NOTE — LETTER
February 11, 2025      Jackienette Medeiros  [address] [place] [address]        To Whom It May Concern:    Jackienette Harrisonmarosa  was seen   in the clinic today. Please excuse them from work/school until symptoms improve.            Sincerely,        Anastacio Ravi PA-C    Electronically signed

## 2025-02-11 NOTE — PROGRESS NOTES
Chief Complaint   Patient presents with    Urgent Care     Pt present with back of neck pain(started today), bump on R eyelid, blurry vision(both eyes), fatigue, onset since 1/24.        Assessment & Plan  Assessment  Muscle strain or sprain of the neck with associated muscle spasms causing pain.   Eye irritation is a continuation of the hordeolum I did previously for.  Has not been able to follow-up with optometry yet.  Discussed calling his insurance to find other locations that might be able to see him sooner.    Plan  - Send lidocaine patches to the pharmacy for neck pain relief.  - Recommend Tylenol and ibuprofen with a maximum dosage for pain management.  - Recommend physical therapy to address neck muscle weakness and spasms.  - Send artificial tears (lubricating eye drops) to the pharmacy for eye irritation.  - Send Patanol to the pharmacy for eye irritation.  -Continue using Romycin and at home measures for hordeolum.  - Advise to [employer] [facility] and call the number on the back of the [employer] card to obtain a list of covered optometrists.    Appointments  - Eye appointment on February 21, 2025.     ICD-10-CM    1. Hordeolum externum of right upper eyelid  H00.011 olopatadine (PATANOL) 0.1 % ophthalmic solution     artificial tears (GENTEAL) 0.1-0.2-0.3 % ophthalmic solution      2. Neck pain  M54.2 Lidocaine (LIDOCARE) 4 % Patch     Physical Therapy  Referral          SUBJECTIVE:  History of Present Illness-Jackie Medeiros, a 30-year-old male, reports ongoing eye irritation that began around January 24-25, 2025. The eye issue has persisted despite previous treatment, providing only minimal relief. 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 Additionally, he has been experiencing neck pain for the past four days, described as a pain level of 6.5 out of 10, with no radiation to the arms. He has not taken Tylenol or ibuprofen for the neck pain but  has been using Seroquel for insomnia. There is no history of recent upper extremity exercise or activity that could have triggered the neck pain.     ROS: Pertinent ROS neg other than the symptoms noted above in the HPI.     OBJECTIVE:  /81   Pulse 68   Temp 97.1  F (36.2  C) (Tympanic)   Resp 17   Wt 91.1 kg (200 lb 12.8 oz)   SpO2 98%   BMI 28.40 kg/m     Physical Exam- MUSCULOSKELETAL: Pain with neck flexion, pain with terminal rotation to the left, reduced pain with rotation to the right. Tenderness from C7 upwards and paracervical muscle tenderness.   HEENT: Right upper eyelid with an erythematous pustule and mass identified as a stye. Left upper eyelid with a smaller, less erythematous mass. Conjunctivae appear normal. Pupils are equal, round, and reactive to light (PERRL)       DIAGNOSTICS       No results found for any visits on 02/11/25.     Anastcaio Ravi PA-C    Consent was obtained from the patient to use an AI documentation tool in the creation of this note.  Any grammatical or spelling errors are non-intentional. Please contact the author of this note directly if you are in need of any clarification.     Current Outpatient Medications   Medication Sig Dispense Refill    acetaminophen (TYLENOL) 500 MG tablet Take 1-2 tablets (500-1,000 mg) by mouth every 6 hours as needed for mild pain. 30 tablet 0    artificial tears (GENTEAL) 0.1-0.2-0.3 % ophthalmic solution Place 1 drop into both eyes daily as needed for dry eyes. 15 mL 0    erythromycin (ROMYCIN) 5 MG/GM ophthalmic ointment Place 0.5 inches into both eyes 4 times daily for 7 days. 10 g 0    fluticasone (FLONASE) 50 MCG/ACT nasal spray Spray 1 spray into both nostrils daily. 9.9 mL 0    Lidocaine (LIDOCARE) 4 % Patch Place 1 patch over 12 hours onto the skin every 24 hours. To prevent lidocaine toxicity, patient should be patch free for 12 hrs daily. 15 patch 0    olopatadine (PATANOL) 0.1 % ophthalmic solution Place 1 drop into both  eyes 2 times daily. 10 mL 0    polyethylene glycol-propylene glycol PF (SYSTANE ULTRA PF) 0.4-0.3 % SOLN opthalmic solution Place 1 drop into both eyes every hour as needed for dry eyes. 30 each 0    QUEtiapine (SEROQUEL) 50 MG tablet Take 1 tablet (50 mg) by mouth 2 times daily. 30 tablet 1    vitamin D3 (CHOLECALCIFEROL) 50 mcg (2000 units) tablet Take 1 tablet (50 mcg) by mouth daily. 90 tablet 1    QUEtiapine (SEROQUEL) 25 MG tablet Take 1 tablet (25 mg) by mouth at bedtime. (Patient not taking: Reported on 2/11/2025) 90 tablet 1     No current facility-administered medications for this visit.      Patient Active Problem List   Diagnosis    GERD (gastroesophageal reflux disease)    Environmental allergies    Hyperlipidemia LDL goal <130    Vitamin D deficiency    Insomnia    Cyclothymic disorder    Elevated glucose    Rhinoconjunctivitis      Past Medical History:   Diagnosis Date    Abnormal laboratory test 1/1/2012    Allergic state     Anemia 8/6/2013    Environmental allergies 1/6/2013    Vitamin D deficiency 1/24/14    vitamin D level 23     Past Surgical History:   Procedure Laterality Date    NO PAST SURGERIES       Family History   Problem Relation Age of Onset    Stomach Problem Mother     Kidney Disease Father     Diabetes Type 1 Sister     Hypothyroidism Sister     Seizure Disorder Sister     No Known Problems Sister     No Known Problems Sister     No Known Problems Sister     No Known Problems Brother     Family History Negative No family hx of      Social History     Tobacco Use    Smoking status: Never    Smokeless tobacco: Never   Substance Use Topics    Alcohol use: No

## 2025-02-12 NOTE — TELEPHONE ENCOUNTER
FUTURE VISIT INFORMATION      FUTURE VISIT INFORMATION:  Date: 2/21/24  Time: 8:30am  Location: [facility] – [place], [place]  REFERRAL INFORMATION:  Referring provider:  Anastacio Ravi PA-C  Referring providers clinic:  [facility]  Reason for visit/diagnosis  chalazion     RECORDS REQUESTED FROM:       Clinic name Comments Records Status Imaging Status   [facility] Ov/referral 2/13/25 epic

## 2025-02-13 ENCOUNTER — OFFICE VISIT (OUTPATIENT)
Dept: URGENT CARE | Facility: URGENT CARE | Age: 30
End: 2025-02-13
Payer: COMMERCIAL

## 2025-02-13 VITALS
BODY MASS INDEX: 29.14 KG/M2 | DIASTOLIC BLOOD PRESSURE: 82 MMHG | HEART RATE: 85 BPM | TEMPERATURE: 98.7 F | SYSTOLIC BLOOD PRESSURE: 134 MMHG | OXYGEN SATURATION: 95 % | WEIGHT: 206 LBS

## 2025-02-13 DIAGNOSIS — H00.029 HORDEOLUM INTERNUM, UNSPECIFIED LATERALITY: Primary | ICD-10-CM

## 2025-02-13 RX ORDER — POLYMYXIN B SULFATE AND TRIMETHOPRIM 1; 10000 MG/ML; [USP'U]/ML
1-2 SOLUTION OPHTHALMIC EVERY 4 HOURS
Qty: 10 ML | Refills: 0 | Status: SHIPPED | OUTPATIENT
Start: 2025-02-13 | End: 2025-02-20

## 2025-02-14 NOTE — PATIENT INSTRUCTIONS
Continue hot packs  Stop erythromycin  Start polytrim  Follow up with ophthalmology next week as planned.

## 2025-02-14 NOTE — PROGRESS NOTES
Assessment & Plan     Hordeolum internum, unspecified laterality  - polymixin b-trimethoprim (POLYTRIM) 32394-4.1 UNIT/ML-% ophthalmic solution  Dispense: 10 mL; Refill: 0     Patient Instructions   Continue hot packs  Stop erythromycin  Start polytrim  Follow up with ophthalmology next week as planned.        Return in about 1 week (around 2/20/2025) for with regular provider if symptoms persist.    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 He is not sure if it is the light causing the issues. He is seeing a specialist soon.      HPI    Eye Problem    Onset of symptoms was 2 week(s) ago.   Location: both eyes   Course of illness is worsening.    Severity moderate  Current and Associated symptoms: burning, redness, eyelid redness, swelling and lump noted  Treatment measures tried include saline and erythromycin  Context: History of styes      Review of Systems  Constitutional, HEENT, cardiovascular, pulmonary, GI, , musculoskeletal, neuro, skin, endocrine and psych systems are negative, except as otherwise noted.      Objective    /82   Pulse 85   Temp 98.7  F (37.1  C) (Oral)   Wt 93.4 kg (206 lb)   SpO2 95%   BMI 29.14 kg/m    Physical Exam   GENERAL: alert and no distress  EYES: eyelids- hordeolum/sty bilateral upper lid  RESP: lungs clear to auscultation - no rales, rhonchi or wheezes  CV: regular rate and rhythm, normal S1 S2, no S3 or S4, no murmur, click or rub, no peripheral edema  MS: no gross musculoskeletal defects noted, no edema

## 2025-02-21 ENCOUNTER — OFFICE VISIT (OUTPATIENT)
Dept: OPHTHALMOLOGY | Facility: CLINIC | Age: 30
End: 2025-02-21
Attending: PHYSICIAN ASSISTANT
Payer: COMMERCIAL

## 2025-02-21 ENCOUNTER — PRE VISIT (OUTPATIENT)
Dept: OPHTHALMOLOGY | Facility: CLINIC | Age: 30
End: 2025-02-21

## 2025-02-21 ENCOUNTER — OFFICE VISIT (OUTPATIENT)
Dept: FAMILY MEDICINE | Facility: CLINIC | Age: 30
End: 2025-02-21
Payer: COMMERCIAL

## 2025-02-21 VITALS
HEIGHT: 71 IN | TEMPERATURE: 97.4 F | DIASTOLIC BLOOD PRESSURE: 78 MMHG | BODY MASS INDEX: 29.12 KG/M2 | WEIGHT: 208 LBS | RESPIRATION RATE: 18 BRPM | HEART RATE: 70 BPM | OXYGEN SATURATION: 97 % | SYSTOLIC BLOOD PRESSURE: 117 MMHG

## 2025-02-21 DIAGNOSIS — G47.09 OTHER INSOMNIA: ICD-10-CM

## 2025-02-21 DIAGNOSIS — H02.055 TRICHIASIS OF LEFT LOWER EYELID: Primary | ICD-10-CM

## 2025-02-21 DIAGNOSIS — F41.9 CHRONIC ANXIETY: Primary | ICD-10-CM

## 2025-02-21 DIAGNOSIS — H00.011 HORDEOLUM EXTERNUM OF RIGHT UPPER EYELID: ICD-10-CM

## 2025-02-21 PROCEDURE — 99207 EXCISION OF EYELID LESION: CPT | Performed by: OPHTHALMOLOGY

## 2025-02-21 PROCEDURE — 92285 EXTERNAL OCULAR PHOTOGRAPHY: CPT | Performed by: OPHTHALMOLOGY

## 2025-02-21 PROCEDURE — 3074F SYST BP LT 130 MM HG: CPT | Performed by: FAMILY MEDICINE

## 2025-02-21 PROCEDURE — 3078F DIAST BP <80 MM HG: CPT | Performed by: FAMILY MEDICINE

## 2025-02-21 PROCEDURE — G2211 COMPLEX E/M VISIT ADD ON: HCPCS | Performed by: FAMILY MEDICINE

## 2025-02-21 PROCEDURE — 67800 REMOVE EYELID LESION: CPT | Mod: E3 | Performed by: OPHTHALMOLOGY

## 2025-02-21 PROCEDURE — 99203 OFFICE O/P NEW LOW 30 MIN: CPT | Mod: 25 | Performed by: OPHTHALMOLOGY

## 2025-02-21 PROCEDURE — 99213 OFFICE O/P EST LOW 20 MIN: CPT | Performed by: FAMILY MEDICINE

## 2025-02-21 PROCEDURE — 67820 REVISE EYELASHES: CPT | Mod: E2 | Performed by: OPHTHALMOLOGY

## 2025-02-21 RX ORDER — LIDOCAINE HYDROCHLORIDE 40 MG/ML
3 SOLUTION TOPICAL ONCE
Status: COMPLETED | OUTPATIENT
Start: 2025-02-21 | End: 2025-02-21

## 2025-02-21 RX ORDER — ERYTHROMYCIN 5 MG/G
OINTMENT OPHTHALMIC ONCE
Status: COMPLETED | OUTPATIENT
Start: 2025-02-21 | End: 2025-02-21

## 2025-02-21 RX ORDER — BUSPIRONE HYDROCHLORIDE 5 MG/1
5 TABLET ORAL 2 TIMES DAILY
Qty: 60 TABLET | Refills: 0 | Status: SHIPPED | OUTPATIENT
Start: 2025-02-21

## 2025-02-21 RX ADMIN — ERYTHROMYCIN 1 G: 5 OINTMENT OPHTHALMIC at 09:04

## 2025-02-21 RX ADMIN — LIDOCAINE HYDROCHLORIDE 3 ML: 40 SOLUTION TOPICAL at 09:03

## 2025-02-21 ASSESSMENT — CONF VISUAL FIELD
METHOD: COUNTING FINGERS
OS_INFERIOR_NASAL_RESTRICTION: 0
OS_NORMAL: 1
OD_INFERIOR_TEMPORAL_RESTRICTION: 0
OS_SUPERIOR_TEMPORAL_RESTRICTION: 0
OD_SUPERIOR_NASAL_RESTRICTION: 0
OS_SUPERIOR_NASAL_RESTRICTION: 0
OD_SUPERIOR_TEMPORAL_RESTRICTION: 0
OD_NORMAL: 1
OD_INFERIOR_NASAL_RESTRICTION: 0
OS_INFERIOR_TEMPORAL_RESTRICTION: 0

## 2025-02-21 ASSESSMENT — TONOMETRY
OS_IOP_MMHG: 9
IOP_METHOD: ICARE
OD_IOP_MMHG: 12

## 2025-02-21 ASSESSMENT — EXTERNAL EXAM - LEFT EYE: OS_EXAM: NORMAL

## 2025-02-21 ASSESSMENT — VISUAL ACUITY
OS_CC+: -1
CORRECTION_TYPE: GLASSES
METHOD: SNELLEN - LINEAR
OS_CC: 20/20
OD_CC: 20/20

## 2025-02-21 ASSESSMENT — SLIT LAMP EXAM - LIDS: COMMENTS: LOWER LID TRICHIASIS

## 2025-02-21 ASSESSMENT — ENCOUNTER SYMPTOMS: NERVOUS/ANXIOUS: 1

## 2025-02-21 ASSESSMENT — EXTERNAL EXAM - RIGHT EYE: OD_EXAM: NORMAL

## 2025-02-21 NOTE — NURSING NOTE
Chief Complaints and History of Present Illnesses   Patient presents with    Consult For     Jackie MARYA Medeiros is being seen for a consult today by the request of Anastacio Torres PA-C due to chalazion     Chief Complaint(s) and History of Present Illness(es)       Consult For              Laterality: both eyes    Associated symptoms: Comments: (haloes: .ali)    Pain scale: 8/10    Comments: Jackie Medeiros is being seen for a consult today by the request of Anastacio Torres PA-C due to chalazion              Comments    Dec 25 2024 Bump began on RUL and some swelling on LILO. Polytrim and Patanol used just once daily with each eye both drops. ( Was not aware of the frequency. Did do warm compress BID each eye. Has continued to do this daily to present. Did get some mucus discharge with right eye upon waking and bump went down somewhat right eye.   Yesterday began to get some discomfort on RLL with burning. 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   If drainage or removal can be done today would be in favor of doing so.     Judi Minaya, COT COT 8:24 AM February 21, 2025

## 2025-02-21 NOTE — PATIENT INSTRUCTIONS
Use cold compresses for the first 48 hours to minimize bruising and swelling. It works well to put a washcloth in a bowl of ice water and use the cold washcloth.     After 48 hours switch back to using a hot washcloth to clean around your eyelids in the morning and in the evening to prevent new styes from forming.    Apply the prescribed/provided ointment into the eye three times daily for 10 days. It will make your vision a bit blurry.

## 2025-02-21 NOTE — LETTER
February 21, 2025      Jackie Medeiros  [address] [place] [address]        To Whom It May Concern:    Jackie Medeiros was seen in our clinic. He may return to work with the following: Patient should be allowed to work as few as 6 hours per work day due to medical management through 03/07/2025. He will need to be excused twice daily for 10 minutes in morning and afternoon to take his medication.       Sincerely,      Russ Gaspar      Electronically signed

## 2025-02-21 NOTE — NURSING NOTE
Chief Complaints and History of Present Illnesses   Patient presents with    Consult For     Jackiesumit Medeiros is being seen for a consult today by the request of Anastacio Torres PA-C due to chalazion     Chief Complaint(s) and History of Present Illness(es)       Consult For              Laterality: both eyes    Associated symptoms: Comments: (haloes: .ali)    Pain scale: 8/10    Comments: Jackie Medeiros is being seen for a consult today by the request of Anastacio Torres PA-C due to chalazion              Comments    Dec 25 2024 Bump began on RUL and some swelling on LILO. Polytrim and Patanol used just once daily with each eye both drops. ( Was not aware of the frequency. Did do warm compress BID each eye. Has continued to do this daily to present. Did get some mucus discharge with right eye upon waking and bump went down somewhat right eye.   Yesterday began to get some discomfort on RLL with burning. 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   If drainage or removal can be done today would be in favor of doing so. Also has issues with lashes going into each eye.   Has has had lashes epilated in the past in 2017 with each eye.     Judi Minaya, COT COT 8:24 AM February 21, 2025

## 2025-02-21 NOTE — PROGRESS NOTES
Chief Complaints and History of Present Illnesses   Patient presents with    Consult For     Jackie Medeiros is being seen for a consult today by the request of Anastacio Torres PA-C due to chalazion     Chief Complaint(s) and History of Present Illness(es)     Consult For    In both eyes.  Associated symptoms include Comments: (haloes: Hussainali).  Pain   was noted as 8/10. Additional comments: Jackie Medeiros is being seen   for a consult today by the request of Anastacio Torres PA-C due to   chalazion           Comments    Dec 25 2024 Bump began on RUL and some swelling on LILO. Polytrim and   Patanol used just once daily with each eye both drops. ( Was not aware of   the frequency. Did do warm compress BID each eye. Has continued to do this   daily to present. Did get some mucus discharge with right eye upon waking   and bump went down somewhat right eye.   Yesterday began to get some discomfort on RLL with burning. Symptoms   impacting work with each eye burning so unable to work on the computer   with eye issues.   If drainage or removal can be done today would be in favor of doing so.   Also has issues with lashes going into each eye.   Has has had lashes epilated in the past in 2017 with each eye.     Judi Minaya, COT COT 8:24 AM February 21, 2025               Assessment & Plan     Jackie Medeiros is a 30 year old male with the following diagnoses:   1. Hordeolum externum of right upper eyelid                     Attending Physician Attestation:  Complete documentation of historical and exam elements from today's encounter can be found in the full encounter summary report (not reduplicated in this progress note).  I personally obtained the chief complaint(s) and history of present illness.  I confirmed and edited as necessary the review of systems, past medical/surgical history, family history, social history, and examination findings as documented by others; and I examined the patient myself.   I personally reviewed the relevant tests, images, and reports as documented above.  I formulated and edited as necessary the assessment and plan and discussed the findings and management plan with the patient and family. -Praneeth Barber MD  8:37 AM 2/21/2025

## 2025-02-21 NOTE — PROGRESS NOTES
"  {PROVIDER CHARTING PREFERENCE:019870}    Jayleen Mejia is a 30 year old, presenting for the following health issues:  Insomnia and Anxiety      2/21/2025    10:20 AM   Additional Questions   Roomed by ÁNGELA Davenport   Accompanied by Self     Anxiety    History of Present Illness       Reason for visit:  Anxiety insomnia He is missing 2 dose(s) of medications per week.  He is not taking prescribed medications regularly due to remembering to take and other.       {MA/LPN/RN Pre-Provider Visit Orders- hCG/UA/Strep (Optional):450953}  {SUPERLIST (Optional):156616}  {additonal problems for provider to add (Optional):273887}    {ROS Picklists (Optional):860846}    Patient starts to have trouble getting to sleep and staying asleep during shorter days of the year. Taking the seroquel helps. Melatonin hasn't helped.      The lidocaine patch helps neck pain, but it tends to come off.     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 He has anxiety throughout the day.     Patient has racing thoughts about tasks he needs to do, which can interfere with sleep.     He does not feel he is depressed, except for being irritable he feels his related to poor sleep, and seasonal depression.     He has been taking Seroquel on/off for 8-9 years. He did not take it sometimes when his sleep was good.       Objective    /78 (BP Location: Right arm, Patient Position: Sitting, Cuff Size: Adult Large)   Pulse 70   Temp 97.4  F (36.3  C) (Oral)   Resp 18   Ht 1.791 m (5' 10.5\")   Wt 94.3 kg (208 lb)   SpO2 97%   BMI 29.42 kg/m    Body mass index is 29.42 kg/m .  Physical Exam   {Exam List (Optional):402704}  Vital signs reviewed.  Patient is in no acute appearing distress.  Breathing appears nonlabored.  Patient is alert and oriented ×3.  Patient is pleasant, making good eye contact and responding with clear fluent speech.    Heart: Heart rate is regular without murmur.    Lungs: Lungs are clear to " auscultation with good airflow bilaterally.    Skin/extremities: Warm and dry, with no lower leg edema.    {Diagnostic Test Results (Optional):343839}        Signed Electronically by: Russ Gaspar DO  {Email feedback regarding this note to [email or web address]   :[id number]}  Answers submitted by the patient for this visit:  General Questionnaire (Submitted on 2/21/2025)  Chief Complaint: Chronic problems general questions HPI Form  What is the reason for your visit today? : Anxiety insomnia  How many days per week do you miss taking your medication?: 2  What makes it hard for you to take your medication every day?: remembering to take, other  Questionnaire about: Chronic problems general questions HPI Form (Submitted on 2/21/2025)  Chief Complaint: Chronic problems general questions HPI Form

## 2025-02-21 NOTE — PATIENT INSTRUCTIONS
Start taking the buspirone as prescribed with your current Seroquel management until you follow up with Ms Dinh next month. Consider light therapy for your mental health. You can get this without a prescription.

## 2025-02-24 DIAGNOSIS — F34.0 CYCLOTHYMIC DISORDER: ICD-10-CM

## 2025-02-24 DIAGNOSIS — G47.09 OTHER INSOMNIA: ICD-10-CM

## 2025-02-24 RX ORDER — QUETIAPINE FUMARATE 50 MG/1
50 TABLET, FILM COATED ORAL 2 TIMES DAILY
Qty: 30 TABLET | Refills: 5 | Status: SHIPPED | OUTPATIENT
Start: 2025-02-24

## 2025-03-05 DIAGNOSIS — R09.82 PND (POST-NASAL DRIP): ICD-10-CM

## 2025-03-05 RX ORDER — FLUTICASONE PROPIONATE 50 MCG
1 SPRAY, SUSPENSION (ML) NASAL DAILY
Qty: 9.9 ML | Refills: 0 | Status: SHIPPED | OUTPATIENT
Start: 2025-03-05

## 2025-04-07 ENCOUNTER — PATIENT OUTREACH (OUTPATIENT)
Dept: CARE COORDINATION | Facility: CLINIC | Age: 30
End: 2025-04-07
Payer: COMMERCIAL

## (undated) RX ORDER — ERYTHROMYCIN 5 MG/G
OINTMENT OPHTHALMIC
Status: DISPENSED
Start: 2025-02-21

## (undated) RX ORDER — LIDOCAINE HYDROCHLORIDE 40 MG/ML
SOLUTION TOPICAL
Status: DISPENSED
Start: 2025-02-21